# Patient Record
Sex: FEMALE | Race: WHITE | NOT HISPANIC OR LATINO | Employment: UNEMPLOYED | URBAN - METROPOLITAN AREA
[De-identification: names, ages, dates, MRNs, and addresses within clinical notes are randomized per-mention and may not be internally consistent; named-entity substitution may affect disease eponyms.]

---

## 2018-02-09 ENCOUNTER — OFFICE VISIT CONVERTED (OUTPATIENT)
Dept: INTERNAL MEDICINE | Facility: CLINIC | Age: 73
End: 2018-02-09
Attending: INTERNAL MEDICINE

## 2018-03-09 ENCOUNTER — OFFICE VISIT CONVERTED (OUTPATIENT)
Dept: INTERNAL MEDICINE | Facility: CLINIC | Age: 73
End: 2018-03-09
Attending: INTERNAL MEDICINE

## 2018-03-28 ENCOUNTER — CONVERSION ENCOUNTER (OUTPATIENT)
Dept: MAMMOGRAPHY | Facility: HOSPITAL | Age: 73
End: 2018-03-28

## 2018-04-20 ENCOUNTER — CONVERSION ENCOUNTER (OUTPATIENT)
Dept: MAMMOGRAPHY | Facility: HOSPITAL | Age: 73
End: 2018-04-20

## 2018-07-06 ENCOUNTER — OFFICE VISIT CONVERTED (OUTPATIENT)
Dept: INTERNAL MEDICINE | Facility: CLINIC | Age: 73
End: 2018-07-06
Attending: INTERNAL MEDICINE

## 2018-10-22 ENCOUNTER — CONVERSION ENCOUNTER (OUTPATIENT)
Dept: MAMMOGRAPHY | Facility: HOSPITAL | Age: 73
End: 2018-10-22

## 2019-04-05 ENCOUNTER — OFFICE VISIT CONVERTED (OUTPATIENT)
Dept: INTERNAL MEDICINE | Facility: CLINIC | Age: 74
End: 2019-04-05
Attending: INTERNAL MEDICINE

## 2019-04-05 ENCOUNTER — CONVERSION ENCOUNTER (OUTPATIENT)
Dept: INTERNAL MEDICINE | Facility: CLINIC | Age: 74
End: 2019-04-05

## 2019-08-23 ENCOUNTER — HOSPITAL ENCOUNTER (OUTPATIENT)
Dept: MAMMOGRAPHY | Facility: HOSPITAL | Age: 74
Discharge: HOME | End: 2019-08-23
Attending: INTERNAL MEDICINE

## 2019-11-20 ENCOUNTER — OFFICE VISIT CONVERTED (OUTPATIENT)
Dept: INTERNAL MEDICINE | Facility: CLINIC | Age: 74
End: 2019-11-20
Attending: INTERNAL MEDICINE

## 2019-11-20 ENCOUNTER — CONVERSION ENCOUNTER (OUTPATIENT)
Dept: INTERNAL MEDICINE | Facility: CLINIC | Age: 74
End: 2019-11-20

## 2019-11-20 ENCOUNTER — HOSPITAL ENCOUNTER (OUTPATIENT)
Dept: OTHER | Facility: HOSPITAL | Age: 74
Discharge: HOME | End: 2019-11-20
Attending: INTERNAL MEDICINE

## 2019-11-20 LAB
ALBUMIN SERPL-MCNC: 4.1 G/DL (ref 3.5–5)
ALBUMIN/GLOB SERPL: 1.3 {RATIO} (ref 1.4–2.6)
ALP SERPL-CCNC: 67 U/L (ref 43–160)
ALT SERPL-CCNC: 16 U/L (ref 10–40)
ANION GAP SERPL CALC-SCNC: 17 MMOL/L (ref 8–19)
AST SERPL-CCNC: 26 U/L (ref 15–50)
BASOPHILS # BLD AUTO: 0.05 10*3/UL (ref 0–0.2)
BASOPHILS NFR BLD AUTO: 0.9 % (ref 0–3)
BILIRUB SERPL-MCNC: 0.56 MG/DL (ref 0.2–1.3)
BUN SERPL-MCNC: 13 MG/DL (ref 5–25)
BUN/CREAT SERPL: 12 {RATIO} (ref 6–20)
CALCIUM SERPL-MCNC: 9 MG/DL (ref 8.7–10.4)
CHLORIDE SERPL-SCNC: 105 MMOL/L (ref 99–111)
CHOLEST SERPL-MCNC: 144 MG/DL (ref 107–200)
CHOLEST/HDLC SERPL: 4 {RATIO} (ref 3–6)
CONV ABS IMM GRAN: 0.02 10*3/UL (ref 0–0.2)
CONV CO2: 23 MMOL/L (ref 22–32)
CONV IMMATURE GRAN: 0.4 % (ref 0–1.8)
CONV TOTAL PROTEIN: 7.3 G/DL (ref 6.3–8.2)
CREAT UR-MCNC: 1.09 MG/DL (ref 0.5–0.9)
DEPRECATED RDW RBC AUTO: 47.9 FL (ref 36.4–46.3)
EOSINOPHIL # BLD AUTO: 0.11 10*3/UL (ref 0–0.7)
EOSINOPHIL # BLD AUTO: 2 % (ref 0–7)
ERYTHROCYTE [DISTWIDTH] IN BLOOD BY AUTOMATED COUNT: 14.2 % (ref 11.7–14.4)
ERYTHROCYTE [SEDIMENTATION RATE] IN BLOOD: 12 MM/H (ref 0–30)
GFR SERPLBLD BASED ON 1.73 SQ M-ARVRAT: 49 ML/MIN/{1.73_M2}
GLOBULIN UR ELPH-MCNC: 3.2 G/DL (ref 2–3.5)
GLUCOSE SERPL-MCNC: 98 MG/DL (ref 65–99)
HCT VFR BLD AUTO: 40 % (ref 37–47)
HDLC SERPL-MCNC: 36 MG/DL (ref 40–60)
HGB BLD-MCNC: 13.5 G/DL (ref 12–16)
LDLC SERPL CALC-MCNC: 78 MG/DL (ref 70–100)
LYMPHOCYTES # BLD AUTO: 1.5 10*3/UL (ref 1–5)
LYMPHOCYTES NFR BLD AUTO: 26.8 % (ref 20–45)
MCH RBC QN AUTO: 30.9 PG (ref 27–31)
MCHC RBC AUTO-ENTMCNC: 33.8 G/DL (ref 33–37)
MCV RBC AUTO: 91.5 FL (ref 81–99)
MONOCYTES # BLD AUTO: 0.39 10*3/UL (ref 0.2–1.2)
MONOCYTES NFR BLD AUTO: 7 % (ref 3–10)
NEUTROPHILS # BLD AUTO: 3.52 10*3/UL (ref 2–8)
NEUTROPHILS NFR BLD AUTO: 62.9 % (ref 30–85)
NRBC CBCN: 0 % (ref 0–0.7)
OSMOLALITY SERPL CALC.SUM OF ELEC: 292 MOSM/KG (ref 273–304)
PLATELET # BLD AUTO: 203 10*3/UL (ref 130–400)
PMV BLD AUTO: 11.4 FL (ref 9.4–12.3)
POTASSIUM SERPL-SCNC: 3.9 MMOL/L (ref 3.5–5.3)
RBC # BLD AUTO: 4.37 10*6/UL (ref 4.2–5.4)
SODIUM SERPL-SCNC: 141 MMOL/L (ref 135–147)
TRIGL SERPL-MCNC: 149 MG/DL (ref 40–150)
TSH SERPL-ACNC: 1.86 M[IU]/L (ref 0.27–4.2)
VLDLC SERPL-MCNC: 30 MG/DL (ref 5–37)
WBC # BLD AUTO: 5.59 10*3/UL (ref 4.8–10.8)

## 2020-02-26 ENCOUNTER — HOSPITAL ENCOUNTER (OUTPATIENT)
Dept: MAMMOGRAPHY | Facility: HOSPITAL | Age: 75
Discharge: HOME | End: 2020-02-26
Attending: INTERNAL MEDICINE

## 2020-03-12 ENCOUNTER — HOSPITAL ENCOUNTER (OUTPATIENT)
Dept: URGENT CARE | Facility: CLINIC | Age: 75
Discharge: HOME | End: 2020-03-12
Attending: PHYSICIAN ASSISTANT

## 2020-05-06 ENCOUNTER — TELEMEDICINE CONVERTED (OUTPATIENT)
Dept: INTERNAL MEDICINE | Facility: CLINIC | Age: 75
End: 2020-05-06
Attending: PHYSICIAN ASSISTANT

## 2020-11-02 ENCOUNTER — HOSPITAL ENCOUNTER (OUTPATIENT)
Dept: OTHER | Facility: HOSPITAL | Age: 75
Discharge: HOME | End: 2020-11-02
Attending: INTERNAL MEDICINE

## 2020-11-02 LAB
ALBUMIN SERPL-MCNC: 4.1 G/DL (ref 3.5–5)
ALBUMIN/GLOB SERPL: 1.5 {RATIO} (ref 1.4–2.6)
ALP SERPL-CCNC: 70 U/L (ref 43–160)
ALT SERPL-CCNC: 11 U/L (ref 10–40)
ANION GAP SERPL CALC-SCNC: 14 MMOL/L (ref 8–19)
AST SERPL-CCNC: 18 U/L (ref 15–50)
BASOPHILS # BLD AUTO: 0.06 10*3/UL (ref 0–0.2)
BASOPHILS NFR BLD AUTO: 0.8 % (ref 0–3)
BILIRUB SERPL-MCNC: 0.48 MG/DL (ref 0.2–1.3)
BUN SERPL-MCNC: 16 MG/DL (ref 5–25)
BUN/CREAT SERPL: 16 {RATIO} (ref 6–20)
CALCIUM SERPL-MCNC: 9.3 MG/DL (ref 8.7–10.4)
CHLORIDE SERPL-SCNC: 101 MMOL/L (ref 99–111)
CHOLEST SERPL-MCNC: 159 MG/DL (ref 107–200)
CHOLEST/HDLC SERPL: 4.1 {RATIO} (ref 3–6)
CONV ABS IMM GRAN: 0.01 10*3/UL (ref 0–0.2)
CONV CO2: 27 MMOL/L (ref 22–32)
CONV IMMATURE GRAN: 0.1 % (ref 0–1.8)
CONV TOTAL PROTEIN: 6.9 G/DL (ref 6.3–8.2)
CREAT UR-MCNC: 1.02 MG/DL (ref 0.5–0.9)
DEPRECATED RDW RBC AUTO: 47.7 FL (ref 36.4–46.3)
EOSINOPHIL # BLD AUTO: 0.14 10*3/UL (ref 0–0.7)
EOSINOPHIL # BLD AUTO: 1.9 % (ref 0–7)
ERYTHROCYTE [DISTWIDTH] IN BLOOD BY AUTOMATED COUNT: 14 % (ref 11.7–14.4)
GFR SERPLBLD BASED ON 1.73 SQ M-ARVRAT: 53 ML/MIN/{1.73_M2}
GLOBULIN UR ELPH-MCNC: 2.8 G/DL (ref 2–3.5)
GLUCOSE SERPL-MCNC: 118 MG/DL (ref 65–99)
HCT VFR BLD AUTO: 44 % (ref 37–47)
HDLC SERPL-MCNC: 39 MG/DL (ref 40–60)
HGB BLD-MCNC: 14.4 G/DL (ref 12–16)
LDLC SERPL CALC-MCNC: 78 MG/DL (ref 70–100)
LYMPHOCYTES # BLD AUTO: 1.82 10*3/UL (ref 1–5)
LYMPHOCYTES NFR BLD AUTO: 24.4 % (ref 20–45)
MCH RBC QN AUTO: 30.2 PG (ref 27–31)
MCHC RBC AUTO-ENTMCNC: 32.7 G/DL (ref 33–37)
MCV RBC AUTO: 92.2 FL (ref 81–99)
MONOCYTES # BLD AUTO: 0.48 10*3/UL (ref 0.2–1.2)
MONOCYTES NFR BLD AUTO: 6.4 % (ref 3–10)
NEUTROPHILS # BLD AUTO: 4.95 10*3/UL (ref 2–8)
NEUTROPHILS NFR BLD AUTO: 66.4 % (ref 30–85)
NRBC CBCN: 0 % (ref 0–0.7)
OSMOLALITY SERPL CALC.SUM OF ELEC: 290 MOSM/KG (ref 273–304)
PLATELET # BLD AUTO: 205 10*3/UL (ref 130–400)
PMV BLD AUTO: 11.5 FL (ref 9.4–12.3)
POTASSIUM SERPL-SCNC: 3.4 MMOL/L (ref 3.5–5.3)
RBC # BLD AUTO: 4.77 10*6/UL (ref 4.2–5.4)
SODIUM SERPL-SCNC: 139 MMOL/L (ref 135–147)
T4 FREE SERPL-MCNC: 1.7 NG/DL (ref 0.9–1.8)
TRIGL SERPL-MCNC: 210 MG/DL (ref 40–150)
TSH SERPL-ACNC: 0.51 M[IU]/L (ref 0.27–4.2)
VLDLC SERPL-MCNC: 42 MG/DL (ref 5–37)
WBC # BLD AUTO: 7.46 10*3/UL (ref 4.8–10.8)

## 2021-05-13 NOTE — PROGRESS NOTES
Progress Note      Patient Name: Nancie Grissom   Patient ID: [id number]   Sex: Female   YOB: 1945    Primary Care Provider: Mauro Najera MD    Visit Date: May 6, 2020    Provider: Nyla Tolbert PA-C   Location: [facility]   Location Address: [address] [place] [address], [address]  [place], [place]  [id number]   Location Phone: [phone]          Chief Complaint  · Annual Wellness Exam      History Of Present Illness  Video Conferencing Visit  Nancie Grissom is a 74 year old /White female who is presenting for evaluation via video conferencing via [employer]. Verbal consent obtained before beginning visit.   The following staff were present during this visit: Nyla Tolbert PA-C   Her Primary Care Provider is Mauro Najera MD. Her comprehensive Care Team list, including suppliers, has been updated on the Facesheet. Her medical/family history, height, weight, BMI, and blood pressure have been reviewed and are in the chart. The Health Risk Assessment has been completed and scanned in the chart.   Medications are listed in the medication list.   The active problem list includes: Depression, GERD, Hyperlipidemia, Hypertension, Hypothyroidism, Osteoporosis, and Vitamin D deficiency   The patient does not have a history of substance use.   Patient reports her diet is adequate.   The Mini-Cog has been administered and is scanned in chart. The results are negative. Her cognitive function is without limitation.   A hearing loss screen was completed today and the result is negative.   Patient has the following risk factors for depression: currently has depression. Patient completed the PHQ-9 today and it has been scanned in the chart. The total score is 1-4.   The Timed Up and Go screen was administered today and the result is negative.   The Tinajero Index of [place] in ADLs indicated full function (score of 6).   A Falls Risk Assessment has been completed, including a review of home fall  hazards and medication review.   Overall, the patient's functional ability is noted by this provider to be within normal limits. Her level of safety is noted to be within normal limits. Her balance/gait is within normal limits. There have been no falls in the past year. Patient-specific home safety recommendations have been reviewed and a copy has been given to patient.   She denies issues with leaking urine.   There are no additional risk factors identified.   Living Will/Advanced Directive has not previously been completed.   Personalized health advice was given to the patient and a written health screening schedule was established; see Plan for details.      Pt refuses colonoscopy or screening for colon cancer.    Pt refuses referral for breast biopsy for abnormal mammogram.    Pt is due to get eye exam. She wear glasses. She wants to make her own apt and does not want referral.       Past Medical History  Disease Name Date Onset Notes   Depression --  --    GERD 02/12/2015 --    Hyperlipidemia --  --    Hypertension --  --    Hypothyroidism --  --    Osteoporosis --  --    Vitamin D deficiency --  --          Past Surgical History  Procedure Name Date Notes   Repair of right rotator cuff --  --          Medication List  Name Date Started Instructions   atorvastatin 20 mg oral tablet 11/27/2019 TAKE 1 TABLET BY MOUTH ONCE DAILY AT BEDTIME   hydrochlorothiazide 25 mg oral tablet 03/17/2020 take 1 tablet (25 mg) by oral route once daily   Protonix 40 mg oral tablet,delayed release (DR/EC) 10/15/2019 take 1 tablet (40 mg) by oral route once daily for 30 days   Synthroid 88 mcg oral tablet 03/24/2020 take 1 tablet (88 mcg) by oral route once daily on empty stomach, without other medications.   trazodone 100 mg oral tablet 03/24/2020 take 1 tablet by oral route daily for 90 days   Voltaren 1 % topical gel 11/20/2019 apply 2 grams to the affected area(s) by topical route 4 times per day         Allergy List  Allergen  Name Date Reaction Notes   NO KNOWN DRUG ALLERGIES --  --  --          Family Medical History  Disease Name Relative/Age Notes   *No Known Family History  --          Reproductive History  Menstrual   Menopause Status: Postmenopausal HRT?: No   Pregnancy Summary   Total Pregnancies: 2 Full Term: 0 Premature: 0   Ab Induced: 0 Ab Spontaneous: 0 Ectopics: 0   Multiples: 0 Livin         Social History  Finding Status Start/Stop Quantity Notes   Tobacco Never --/-- --  --          Immunizations  NameDate Admin Mfg Trade Name Lot Number Route Inj VIS Given VIS Publication   [id number]/ [facility] FLUZONE-HIGH DOSE [id number] IM RD 10/21/2019    Comments: Patient tolerated well   Lowgejvqy42/10/2018 PMC FLUZONE-HIGH DOSE [id number] IM RD 10/10/2018 2014   Comments: pt tolerated well   Ocskygqqg21/09/2018 [facility]C FLUZONE-HIGH DOSE [id number] IM RD 2018   Comments: Pt tolerated well   Ntptkfgur81/03/2014 SKB Fluarix, quadrivalent, preservative free [id number] IM LD 2014   Comments: pt left office in stable condition   Hygmekuuk06/17/2014 SKB Fluarix, quadrivalent, preservative free [id number] IM LD 2014   Comments:    Amilyjtbiume86/12/2014 MSD PNEUMOVAX 23 [id number] IM LD 2014 10/06/2009   Comments: Tolerated well and left office in stable condition.         Review of Systems  · Constitutional  o Denies  o : fatigue, fever  · Eyes  o Denies  o : discharge from eye, redness  · HENT  o Denies  o : headaches, congestion  · Cardiovascular  o Denies  o : chest pain, palpitations  · Respiratory  o Denies  o : shortness of breath, wheezing, cough  · Gastrointestinal  o Denies  o : vomiting, diarrhea, constipation  · Genitourinary  o Denies  o : dysuria, hematuria  · Integument  o Denies  o : rash, new skin lesions  · Neurologic  o Denies  o : altered mental status, seizures  · Musculoskeletal  o Denies  o : weakness, joint swelling  · Psychiatric  o Denies  o : anxiety,  depression  · Heme-Lymph  o Denies  o : lymph node enlargement, tenderness      Physical Examination     Gen: well-nourished, no acute distress  HENT: atraumatic, normocephalic  Eyes: extraocular movements intact, no scleral icterus  Lung: breathing comfortably, no cough  Skin: no visible rash, no lesions  Neuro: grossly oriented to person, place, and time. no facial droop   Psych: normal mood and affect  MSK: Normal movement of legs and arms upon ambulation  Gait normal                 Assessment  · Encounter for Medicare annual wellness exam     V70.0/Z00.00  · Screening for depression     V79.0/Z13.89  negative, mood controlled  · Alcohol screening     V79.1/Z13.89  negative  · Advanced care planning/counseling discussion     V65.49/Z71.89  Discussed need for advanced directive/living will. Discussed importance and benefits of having wishes documented for self and family in the event of a health emergency in the future. We will mail the paperwork to the patient to fill out and patient will bring in to be notarized and documented in chart.  · Need for hepatitis C screening test     V73.89/Z11.59  Added to labs  · Hyperlipidemia     272.4/E78.5  labs ordered  · Hypothyroidism     244.9/E03.9  labs ordered  · Hypertension     401.9/I10  · Osteopenia     733.90/M85.80  Will get updated DEXA  · Screening for diabetes mellitus     V77.1/Z13.1  labs ordered  · Abnormal mammogram     793.80/R92.8  discussed abnormal mammogram showing need for biopsy. Pt refused biopsy at this time. She does not want further workup. Pt understands risks.      Plan  · Orders  o Falls Risk Assessment Completed (0198F) - V70.0/Z00.00 - 05/06/2020  o Brief hearing screening (written) [facility] () - V70.0/Z00.00 - 05/06/2020  o Annual Wellness Visit-includes a Personalized Prevention Plan of Service (PPS), SUBSEQUENT VISIT (Medicare) () - V70.0/Z00.00 - 05/06/2020  o Presence or absence of urinary incontinence assessed (YOSSI) (1090F) -  V70.0/Z00.00 - 05/06/2020  o Annual depression screening using the PHQ-9 tool, 15 minutes (, 12523) - V79.0/Z13.89 - 05/06/2020  o Annual alcohol screening using the AUDIT-C tool, 15 minutes [facility] (05257, ) - V79.1/Z13.89 - 05/06/2020  o Negative alcohol screening () - V79.1/Z13.89 - 05/06/2020  o Hepatitis C antibody MEDICARE screening [facility] (92314, ) - V73.89/Z11.59 - 05/06/2020  o ACO-39: Current medications updated and reviewed () - - 05/06/2020  o ACO-20: Screening Mammography documented and reviewed () - - 05/06/2020  o ACO-14: Influenza immunization administered or previously received () - - 05/06/2020  o Free T4 (07215) - 244.9/E03.9 - 05/06/2020  o Hgb A1c [facility] (36968) - 401.9/I10, 272.4/E78.5, 244.9/E03.9, V77.1/Z13.1 - 05/06/2020  o Physical, Primary Care Panel (CBC, CMP, Lipid, TSH) [facility] (55333, 11759, 14754, 59789) - 401.9/I10, 272.4/E78.5, 244.9/E03.9, 733.90/M85.80 - 05/06/2020  o Vitamin D (25-Hydroxy) Level (78259) - 401.9/I10, 272.4/E78.5, 244.9/E03.9, 733.90/M85.80 - 05/06/2020  o ACO-18: Negative screen for clinical depression using a standardized tool () - - 05/06/2020  o ACO-13: Fall Risk Screening with no falls in past year or only one fall without injury in the past year (1101F) - - 05/06/2020  o ACO - Pt declines to or was not able to provide an Advance Care Plan or name a Surrogate Decision Maker (1124F) - - 05/06/2020  o DEXA Bone Density, 1 or more sites, axial skeleton [facility] (66798) - 733.90/M85.80 - 05/06/2020  · Medications  o Medications have been Reconciled  o Transition of Care or Provider Policy  · Instructions  o Health Risk Assessment has been reviewed with the patient.  o Written health screening schedule for next 5-10 years was established with patient; information scanned in chart and given/mailed to patient.  o Fall prevention methods discussed and a copy of recommendations given/mailed to patient.  o Depression Screen completed and scanned  into the EMR under the designated folder within the patient's documents.  o Today's PHQ-9 result is 3  o Medicare suggests a once in a lifetime screening for Hepatitis C for all Medicare beneficiaries born between 7969-2921.  o Advised that cheeses and other sources of dairy fats, animal fats, fast food, and the extras (candy, pastries, pies, doughnuts and cookies) all contain LDL raising nutrients. Advised to increase fruits, vegetables, whole grains, and to monitor portion sizes.   o Handouts were given to patient: Advanced Directive  o Patient was educated/instructed on their diagnosis, treatment and medications prior to discharge from the clinic today.  o Electronically Identified Patient Education Materials Provided Electronically  · Disposition  o Call or Return if symptoms worsen or persist.  o Follow up in 6 months  o Needs to follow up with MD  o Labs to be printed  o Order placed for imaging            Electronically Signed by: Nyla Tolbert PA-C -Author on May 7, 2020 09:34:12 AM

## 2021-05-15 VITALS — WEIGHT: 171 LBS | HEIGHT: 63 IN | BODY MASS INDEX: 30.3 KG/M2

## 2021-05-15 VITALS
RESPIRATION RATE: 16 BRPM | TEMPERATURE: 98.2 F | HEART RATE: 80 BPM | WEIGHT: 165.12 LBS | OXYGEN SATURATION: 98 % | SYSTOLIC BLOOD PRESSURE: 126 MMHG | HEIGHT: 63 IN | BODY MASS INDEX: 29.26 KG/M2 | DIASTOLIC BLOOD PRESSURE: 80 MMHG

## 2021-05-16 VITALS
SYSTOLIC BLOOD PRESSURE: 134 MMHG | DIASTOLIC BLOOD PRESSURE: 76 MMHG | WEIGHT: 171 LBS | OXYGEN SATURATION: 95 % | HEIGHT: 63 IN | BODY MASS INDEX: 30.3 KG/M2 | HEART RATE: 102 BPM | TEMPERATURE: 98.1 F

## 2021-05-16 VITALS
OXYGEN SATURATION: 98 % | BODY MASS INDEX: 29.77 KG/M2 | TEMPERATURE: 98 F | DIASTOLIC BLOOD PRESSURE: 86 MMHG | WEIGHT: 168 LBS | SYSTOLIC BLOOD PRESSURE: 142 MMHG | HEART RATE: 86 BPM | HEIGHT: 63 IN

## 2021-05-16 VITALS
HEIGHT: 63 IN | TEMPERATURE: 97.2 F | OXYGEN SATURATION: 97 % | RESPIRATION RATE: 16 BRPM | WEIGHT: 176.12 LBS | DIASTOLIC BLOOD PRESSURE: 100 MMHG | HEART RATE: 99 BPM | SYSTOLIC BLOOD PRESSURE: 154 MMHG | BODY MASS INDEX: 31.21 KG/M2

## 2021-05-20 ENCOUNTER — HOSPITAL ENCOUNTER (OUTPATIENT)
Dept: OTHER | Facility: HOSPITAL | Age: 76
Discharge: HOME | End: 2021-05-20
Attending: STUDENT IN AN ORGANIZED HEALTH CARE EDUCATION/TRAINING PROGRAM

## 2021-05-20 ENCOUNTER — CONVERSION ENCOUNTER (OUTPATIENT)
Dept: INTERNAL MEDICINE | Facility: CLINIC | Age: 76
End: 2021-05-20

## 2021-05-20 ENCOUNTER — OFFICE VISIT CONVERTED (OUTPATIENT)
Dept: INTERNAL MEDICINE | Facility: CLINIC | Age: 76
End: 2021-05-20
Attending: STUDENT IN AN ORGANIZED HEALTH CARE EDUCATION/TRAINING PROGRAM

## 2021-05-20 LAB
ALBUMIN SERPL-MCNC: 4.3 G/DL (ref 3.5–5)
ALBUMIN/GLOB SERPL: 1.4 {RATIO} (ref 1.4–2.6)
ALP SERPL-CCNC: 62 U/L (ref 43–160)
ALT SERPL-CCNC: 16 U/L (ref 10–40)
ANION GAP SERPL CALC-SCNC: 17 MMOL/L (ref 8–19)
AST SERPL-CCNC: 23 U/L (ref 15–50)
BASOPHILS # BLD AUTO: 0.06 10*3/UL (ref 0–0.2)
BASOPHILS NFR BLD AUTO: 0.8 % (ref 0–3)
BILIRUB SERPL-MCNC: 0.47 MG/DL (ref 0.2–1.3)
BUN SERPL-MCNC: 14 MG/DL (ref 5–25)
BUN/CREAT SERPL: 12 {RATIO} (ref 6–20)
CALCIUM SERPL-MCNC: 9 MG/DL (ref 8.7–10.4)
CHLORIDE SERPL-SCNC: 101 MMOL/L (ref 99–111)
CHOLEST SERPL-MCNC: 160 MG/DL (ref 107–200)
CHOLEST/HDLC SERPL: 4 {RATIO} (ref 3–6)
CONV ABS IMM GRAN: 0.02 10*3/UL (ref 0–0.2)
CONV CO2: 25 MMOL/L (ref 22–32)
CONV IMMATURE GRAN: 0.3 % (ref 0–1.8)
CONV TOTAL PROTEIN: 7.4 G/DL (ref 6.3–8.2)
CREAT UR-MCNC: 1.13 MG/DL (ref 0.5–0.9)
DEPRECATED RDW RBC AUTO: 46.5 FL (ref 36.4–46.3)
EOSINOPHIL # BLD AUTO: 0.11 10*3/UL (ref 0–0.7)
EOSINOPHIL # BLD AUTO: 1.4 % (ref 0–7)
ERYTHROCYTE [DISTWIDTH] IN BLOOD BY AUTOMATED COUNT: 14.1 % (ref 11.7–14.4)
GFR SERPLBLD BASED ON 1.73 SQ M-ARVRAT: 47 ML/MIN/{1.73_M2}
GLOBULIN UR ELPH-MCNC: 3.1 G/DL (ref 2–3.5)
GLUCOSE SERPL-MCNC: 94 MG/DL (ref 65–99)
HCT VFR BLD AUTO: 42.6 % (ref 37–47)
HDLC SERPL-MCNC: 40 MG/DL (ref 40–60)
HGB BLD-MCNC: 14.2 G/DL (ref 12–16)
LDLC SERPL CALC-MCNC: 90 MG/DL (ref 70–100)
LYMPHOCYTES # BLD AUTO: 1.67 10*3/UL (ref 1–5)
LYMPHOCYTES NFR BLD AUTO: 21.5 % (ref 20–45)
MCH RBC QN AUTO: 30.1 PG (ref 27–31)
MCHC RBC AUTO-ENTMCNC: 33.3 G/DL (ref 33–37)
MCV RBC AUTO: 90.3 FL (ref 81–99)
MONOCYTES # BLD AUTO: 0.54 10*3/UL (ref 0.2–1.2)
MONOCYTES NFR BLD AUTO: 7 % (ref 3–10)
NEUTROPHILS # BLD AUTO: 5.35 10*3/UL (ref 2–8)
NEUTROPHILS NFR BLD AUTO: 69 % (ref 30–85)
NRBC CBCN: 0 % (ref 0–0.7)
OSMOLALITY SERPL CALC.SUM OF ELEC: 288 MOSM/KG (ref 273–304)
PLATELET # BLD AUTO: 201 10*3/UL (ref 130–400)
PMV BLD AUTO: 11.1 FL (ref 9.4–12.3)
POTASSIUM SERPL-SCNC: 3.7 MMOL/L (ref 3.5–5.3)
RBC # BLD AUTO: 4.72 10*6/UL (ref 4.2–5.4)
SODIUM SERPL-SCNC: 139 MMOL/L (ref 135–147)
T4 FREE SERPL-MCNC: 1.3 NG/DL (ref 0.9–1.8)
TRIGL SERPL-MCNC: 149 MG/DL (ref 40–150)
TSH SERPL-ACNC: 0.75 M[IU]/L (ref 0.27–4.2)
VLDLC SERPL-MCNC: 30 MG/DL (ref 5–37)
WBC # BLD AUTO: 7.75 10*3/UL (ref 4.8–10.8)

## 2021-05-21 LAB — 25(OH)D3 SERPL-MCNC: 31.8 NG/ML (ref 30–100)

## 2021-06-05 NOTE — PROGRESS NOTES
Progress Note      Patient Name: Nancie Grissom   Patient ID: [id number]   Sex: Female   YOB: 1945    Primary Care Provider: Wen Leblanc MD    Visit Date: May 20, 2021    Provider: Wen Leblanc MD   Location: [facility]   Location Address: [address] [place] [address], [address]  [place], [place]  [id number]   Location Phone: [phone]          Chief Complaint  · Annual exam   · Hypothyroidism   · Hyperlipidemia       History Of Present Illness  Nancie Grissom is a 75 year old /White female who presents for evaluation and treatment of:      pt is here for a routine follow-up/exam- pt was a previously Dr. Najera patient     Sleepign concern:   She is on trazadone and melatonin. Falls asleep around 12:30am, typically wakes up at 3 am to urinate, and sometimes is able to fall back asleep until 0600, however she also has times when it will take a couple of hours for her to fall back asleep. Endorses morning HA- states she's been told by her niece that she does snore, and niece has concern for RICK, however symptoms are manageable, states they often go away after her morning cup of coffee.     Hypothyroidism:   Chronic, for many years. No recent dose adjustment.      at of 3 yrs ago. She was  for 57 yrs. she has 2 sons who are her support system.   She has a trip to [facility] up.   hx of right shoulder surgery, and surgery involving her thumbs.   last PAP smear was in 2018. Pt with hx of hysterectomy in the 90's, ovaries are in place, states she is not sure if her cervix remains in place.     Right knee sometimes gives out-hx of meniscal tear in the past. Endorses pain every so often, fairly infrequently. typically uses a lidocaine gel which does not offer any relieve. Diclofenac gel is noted on her MAR which she states never using, she was unaware of this agent being prescribed for her in the past. She has a hx of osteopenia on her last Dexa scan from a few years  ago.    hx of shingles 25 yrs ago.     HTN: chronic and stable     GERD: chronic and stable.       Abnormal mammogram-  hx of abnormal mammograms with findings of 0.6cm grouping of calcification within the central area of the right breast, biopsy has been recommended however patient is not interested in pursuing biopsy at that time. her last mammogram was in Feb 2020 and per report she was to have returned in 6 mos for repeat mammogram.     Colon cancer screening: never had a colonoscopy.     never smoker  Social drinker        She is fully vaccinated with the Christiano and Christiano.       Past Medical History  Disease Name Date Onset Notes   Depression --  --    GERD 02/12/2015 --    Hyperlipidemia --  --    Hypertension --  --    Hypothyroidism --  --    Osteoporosis --  --    Vitamin D deficiency --  --          Past Surgical History  Procedure Name Date Notes   Repair of right rotator cuff --  --          Medication List  Name Date Started Instructions   atorvastatin 20 mg oral tablet 04/28/2021 TAKE 1 TABLET BY MOUTH ONCE DAILY AT BEDTIME   hydrochlorothiazide 25 mg oral tablet 04/28/2021 take 1 tablet (25 mg) by oral route once daily   Protonix 40 mg oral tablet,delayed release (DR/EC) 05/20/2021 take 1 tablet (40 mg) by oral route once daily for 30 days   Synthroid 88 mcg oral tablet 03/08/2021 take 1 tablet (88 mcg) by oral route once daily on empty stomach, without other medications.   trazodone 100 mg oral tablet 04/28/2021 take 1 tablet by oral route daily for 90 days   Voltaren 1 % topical gel 05/20/2021 apply 2 grams to the affected area(s) by topical route 4 times per day for 30 days         Allergy List  Allergen Name Date Reaction Notes   NO KNOWN DRUG ALLERGIES --  --  --        Allergies Reconciled  Family Medical History  Disease Name Relative/Age Notes   *No Known Family History  --          Reproductive History  Menstrual   Menopause Status: Postmenopausal HRT?: No   Pregnancy Summary   Total  "Pregnancies: 2 Full Term: 0 Premature: 0   Ab Induced: 0 Ab Spontaneous: 0 Ectopics: 0   Multiples: 0 Livin         Social History  Finding Status Start/Stop Quantity Notes   Tobacco Never --/-- --  --          Immunizations  NameDate Admin Mfg Trade Name Lot Number Route Inj VIS Given VIS Publication   Nukxgldvm54/ [facility] FLUZONE-HIGH DOSE [id number] IM RD 10/21/2019    Comments: Patient tolerated well   Iwoshhsqwjir76/12/2014 MSD PNEUMOVAX 23 [id number] IM LD 2014 10/06/2009   Comments: Tolerated well and left office in stable condition.         Review of Systems  · Constitutional  o Denies  o : fever, fatigue, weight loss, weight gain  · Cardiovascular  o Denies  o : lower extremity edema, claudication, chest pressure, palpitations  · Respiratory  o Denies  o : shortness of breath, wheezing, cough, hemoptysis, dyspnea on exertion  · Gastrointestinal  o Denies  o : nausea, vomiting, diarrhea, constipation, abdominal pain  · Musculoskeletal  o * See HPI  · Psychiatric  o * See HPI      Vitals  Date Time BP Position Site L\R Cuff Size HR RR TEMP (F) WT  HT  BMI kg/m2 BSA m2 O2 Sat FR L/min FiO2 HC       2019 11:01 AM         171lbs 0oz 5'  3\" 30.29 1.86       2019 11:11 /80 Sitting    80 - R 16 98.2 165lbs 2oz 5'  3\" 29.25 1.82 98 %  21%    2021 02:13 /76 Sitting    85 - R  99.6 154lbs 2oz 5'  3\" 27.3 1.76 97 %  21%          Physical Examination  · Constitutional  o Appearance  o : no acute distress, well-nourished  · Head and Face  o Head  o :   § Inspection  § : atraumatic, normocephalic  · Ears, Nose, Mouth and Throat  o Ears  o :   § External Ears  § : normal  o Nose  o :   § Intranasal Exam  § : nares patent  o Oral Cavity  o :   § Oral Mucosa  § : moist mucous membranes  o Throat  o :   § Oropharynx  § : no inflammation or lesions present, tonsils within normal limits  · Neck  o Thyroid  o : gland size normal, nontender, no nodules or masses present on palpation, " symmetric  · Respiratory  o Respiratory Effort  o : breathing comfortably, symmetric chest rise  o Auscultation of Lungs  o : clear to asculatation bilaterally, no wheezes, rales, or rhonchii  · Cardiovascular  o Heart  o :   § Auscultation of Heart  § : regular rate and rhythm, III/VI systolic murmur, rubs, or gallops  o Peripheral Vascular System  o :   § Extremities  § : no edema  · Gastrointestinal  o Abdominal Examination  o : abdomen nontender to palpation, normal bowel sounds, tone normal without rigidity or guarding,   · Lymphatic  o Neck  o : no lymphadenopathy present  · Skin and Subcutaneous Tissue  o General Inspection  o : no lesions present, no areas of discoloration, skin turgor normal  · Neurologic  o Mental Status Examination  o :   § Orientation  § : grossly oriented to person, place and time  o Cranial Nerves  o : crainial nerves 2-12 grossly intact, facial sensation normal bilaterally, no facial weakness present  o Gait and Station  o :   § Gait Screening  § : normal gait          Assessment  · Screening for depression     V79.0/Z13.89  Positive screen with score of 6 signifying mild depression, however 3 of the 6 points are related to her chronic sleeping difficulties and not at all to her mood. Advised pt to increase melatonin to 10 mg, currently on 5mg. continue Trazodone at current dose.   · Screening for colon cancer     V76.51/Z12.11  Patient declines. She's never had a colonoscopy.  · Annual physical exam     V70.0/Z00.00  Presenting for annual physical exam and to meet new provider as prior pcp is no longer in our office.  · Hypothyroidism     244.9/E03.9  Chronic and stable on current Synthroid dose which has not changed for the past several years. Due for labs.   · GERD     530.81  chronic and stable. Need refill for her protonix.   · Hypertension     401.9/I10  Chronic and stable on current regimen with HCTZ alone.  · Osteopenia     733.90/M85.80  Most recent Dexa reviewed with  findings of osteopenia. She is due for repeat. Ordered today.   · Screening for breast cancer     V76.10/Z12.39  Overdue for repeat screening, which was recommended for 6 mos following her last mammograms in Feb 2020. New diagnostic mammogram, 3d ordered given hx of dense breast.   · Abnormal mammogram     793.80/R92.8  Chronic. Interval worsening of breast lesion per 2020 mammogram.  · Snoring     786.09/R06.83  Presume chronic, with morning HA, concerning for RICK. Discussed sleep study with patient which she is not interested in pursuing at this time, as she states her symptoms are manageable.   · Insomnia     780.52/G47.00  Chronic. Concern for RICK. see Snoring and screening for depression above.     Problems Reconciled  Plan  · Orders  o ACO-18: Positive screen for clinical depression using a standardized tool and a follow-up plan documented () - V79.0/Z13.89 - 05/20/2021  o Free T4 (87476) - 244.9/E03.9 - 05/20/2021  o Physical, Primary Care Panel (CBC, CMP, Lipid, TSH) [facility] (66974, 21224, 17918, 17937) - 530.81, 401.9/I10, 244.9/E03.9 - 05/20/2021  o Vitamin D (25-Hydroxy) Level (01509) - 401.9/I10, 244.9/E03.9 - 05/20/2021  o ACO-39: Current medications updated and reviewed (1159F, ) - 530.81, 401.9/I10, 244.9/E03.9 - 05/20/2021  o DEXA Bone Density, 1 or more sites, axial skeleton [facility] (11182) - 733.90/M85.80 - 05/20/2021  o Mammogram Diagnostic 3D breast bilateral (w/US if needed) [facility] (, ) - V76.10/Z12.39 - 05/20/2021  · Medications  o Protonix 40 mg oral tablet,delayed release (DR/EC)   SIG: take 1 tablet (40 mg) by oral route once daily for 30 days   DISP: (30) Tablet with 5 refills  Refilled on 05/20/2021     o Voltaren 1 % topical gel   SIG: apply 2 grams to the affected area(s) by topical route 4 times per day for 30 days   DISP: (1) Tube with 3 refills  Refilled on 05/20/2021     o Medications have been Reconciled  o Transition of Care or Provider Policy  · Instructions  o Depression  Screen completed and scanned into the EMR under the designated folder within the patient's documents.  o Today's PHQ-9 result is _6__  o The provider screening met the required time of 15 minutes.  o Patient declines colorectal cancer screening. Discussed risks associated with not having screening performed.   o Reviewed health maintenance flowsheet and updated information. Orders were placed and/or patient's response was documented.  o Patient is taking medications as prescribed and doing well.   o Patient was educated/instructed on their diagnosis, treatment and medications prior to discharge from the clinic today.  o Patient was instructed to exercise regularly.  o Call the office with any concerns or questions.  · Disposition  o Follow up in 6 months.            Electronically Signed by: Wen Leblanc MD -Author on May 21, 2021 12:38:40 AM

## 2021-07-07 ENCOUNTER — TELEPHONE (OUTPATIENT)
Dept: INTERNAL MEDICINE | Facility: CLINIC | Age: 76
End: 2021-07-07

## 2021-07-07 RX ORDER — HYDROCHLOROTHIAZIDE 25 MG/1
TABLET ORAL
COMMUNITY
Start: 2021-04-28 | End: 2021-07-08 | Stop reason: SDUPTHER

## 2021-07-07 RX ORDER — PANTOPRAZOLE SODIUM 40 MG/1
TABLET, DELAYED RELEASE ORAL
COMMUNITY
Start: 2021-05-26 | End: 2021-12-20 | Stop reason: SDUPTHER

## 2021-07-07 RX ORDER — TRAZODONE HYDROCHLORIDE 100 MG/1
TABLET ORAL
COMMUNITY
Start: 2021-04-28 | End: 2021-07-30 | Stop reason: SDUPTHER

## 2021-07-07 RX ORDER — LEVOTHYROXINE SODIUM 88 UG/1
TABLET ORAL
COMMUNITY
Start: 2021-03-08 | End: 2021-07-08 | Stop reason: SDUPTHER

## 2021-07-07 RX ORDER — ATORVASTATIN CALCIUM 20 MG/1
TABLET, FILM COATED ORAL
COMMUNITY
Start: 2021-04-28 | End: 2021-10-15 | Stop reason: SDUPTHER

## 2021-07-07 NOTE — TELEPHONE ENCOUNTER
Caller: Nancie Girssom    Relationship: Self    Best call back number: [phone]    Medication needed:   Requested Prescriptions      No prescriptions requested or ordered in this encounter   SYNTHROID 88 MG  HYDROCODONE 25 MG    When do you need the refill by: ASAP    What additional details did the patient provide when requesting the medication: PATIENT HAS BEEN OUT OF MEDS FOR A FEW DAYS    Does the patient have less than a 3 day supply:  [x] Yes  [] No    What is the patient's preferred pharmacy: [address] [phone] [facility] [phone] FX

## 2021-07-08 ENCOUNTER — TELEPHONE (OUTPATIENT)
Dept: INTERNAL MEDICINE | Facility: CLINIC | Age: 76
End: 2021-07-08

## 2021-07-08 RX ORDER — LEVOTHYROXINE SODIUM 88 UG/1
88 TABLET ORAL DAILY
Qty: 90 TABLET | Refills: 0 | Status: SHIPPED | OUTPATIENT
Start: 2021-07-08 | End: 2021-10-15 | Stop reason: SDUPTHER

## 2021-07-08 RX ORDER — HYDROCHLOROTHIAZIDE 25 MG/1
25 TABLET ORAL DAILY
Qty: 90 TABLET | Refills: 1 | Status: SHIPPED | OUTPATIENT
Start: 2021-07-08 | End: 2022-07-19 | Stop reason: SDUPTHER

## 2021-07-08 NOTE — TELEPHONE ENCOUNTER
Called patient back and let her know meds have been processed. Patient stated understanding. No other issues or concerns noted currently per patient.  Please look to previous task for med refills.

## 2021-07-08 NOTE — TELEPHONE ENCOUNTER
Caller: Nancie Grissom    Relationship: Self    Best call back number: [phone]     What is the best time to reach you: ANY    Who are you requesting to speak with (clinical staff, provider,  specific staff member): CLINICAL    What was the call regarding: PATIENT STATES SHE WOULD LIKE A CALL BACK TO GET THE STATUS OF HER MEDICATION REFILL     Do you require a callback: YES

## 2021-07-15 VITALS
SYSTOLIC BLOOD PRESSURE: 118 MMHG | OXYGEN SATURATION: 97 % | TEMPERATURE: 99.6 F | HEART RATE: 85 BPM | DIASTOLIC BLOOD PRESSURE: 76 MMHG | HEIGHT: 63 IN | WEIGHT: 154.12 LBS | BODY MASS INDEX: 27.31 KG/M2

## 2021-07-30 ENCOUNTER — TELEPHONE (OUTPATIENT)
Dept: INTERNAL MEDICINE | Facility: CLINIC | Age: 76
End: 2021-07-30

## 2021-07-30 RX ORDER — TRAZODONE HYDROCHLORIDE 100 MG/1
100 TABLET ORAL DAILY
Qty: 90 TABLET | Refills: 1 | Status: SHIPPED | OUTPATIENT
Start: 2021-07-30 | End: 2021-10-15 | Stop reason: SDUPTHER

## 2021-07-30 NOTE — TELEPHONE ENCOUNTER
Last apt 5/21/2021  Pt to follow up in 6 months refill sent  Left a detailed message for patient.

## 2021-07-30 NOTE — TELEPHONE ENCOUNTER
Caller: Jaciel Nancie    Relationship: Self    Best call back number: [phone]     Medication needed:   Requested Prescriptions     Pending Prescriptions Disp Refills   • traZODone (DESYREL) 100 MG tablet         When do you need the refill by: 07/30/21     What additional details did the patient provide when requesting the medication: PATIENT IS NEEDING A REFILL. PLEASE CALL AND ADVISE.     Does the patient have less than a 3 day supply:  [x] Yes  [] No    What is the patient's preferred pharmacy: [address] [phone] [facility] [phone] FX

## 2021-10-15 ENCOUNTER — TELEPHONE (OUTPATIENT)
Dept: INTERNAL MEDICINE | Facility: CLINIC | Age: 76
End: 2021-10-15

## 2021-10-15 RX ORDER — ATORVASTATIN CALCIUM 20 MG/1
20 TABLET, FILM COATED ORAL NIGHTLY
Qty: 90 TABLET | Refills: 1 | Status: SHIPPED | OUTPATIENT
Start: 2021-10-15 | End: 2022-01-21 | Stop reason: SDUPTHER

## 2021-10-15 RX ORDER — LEVOTHYROXINE SODIUM 88 UG/1
88 TABLET ORAL DAILY
Qty: 90 TABLET | Refills: 1 | Status: SHIPPED | OUTPATIENT
Start: 2021-10-15 | End: 2022-01-18 | Stop reason: SDUPTHER

## 2021-10-15 RX ORDER — TRAZODONE HYDROCHLORIDE 100 MG/1
100 TABLET ORAL DAILY
Qty: 90 TABLET | Refills: 1 | Status: SHIPPED | OUTPATIENT
Start: 2021-10-15 | End: 2022-01-21 | Stop reason: SDUPTHER

## 2021-10-15 NOTE — TELEPHONE ENCOUNTER
Caller: Nancie Grissom    Relationship: Self      Medication requested (name and dosage):   - atorvastatin (LIPITOR) 20 MG tablet  - levothyroxine (Synthroid) 88 MCG tablet  - traZODone (DESYREL) 100 MG tablet    Pharmacy where request should be sent:   [place] [address] - [phone] PH - [phone] FX  [phone]    Additional details provided by patient: PATIENT CALLED TO REQUEST REFILLS OF THESE MEDICATIONS.      Best call back number: [phone]     Does the patient have less than a 3 day supply:  [x] Yes  [] No    Narciso Lopez Rep   10/15/21 10:36 EDT

## 2021-12-17 ENCOUNTER — TELEPHONE (OUTPATIENT)
Dept: INTERNAL MEDICINE | Facility: CLINIC | Age: 76
End: 2021-12-17

## 2021-12-17 RX ORDER — PANTOPRAZOLE SODIUM 40 MG/1
TABLET, DELAYED RELEASE ORAL
Status: CANCELLED | OUTPATIENT
Start: 2021-12-17

## 2021-12-17 NOTE — TELEPHONE ENCOUNTER
Caller: Nancie Grissom    Relationship: Self    Best call back number: [phone]    Requested Prescriptions:   Requested Prescriptions     Pending Prescriptions Disp Refills   • pantoprazole (Protonix) 40 MG EC tablet          Pharmacy where request should be sent: [id number] - [place], [place] - [address] [facility] AT [facility] OF [address] & [place] - [phone]  - [phone] FX     Additional details provided by patient: PATIENT'S ORIGINAL PHARMACY STOPPED ACCEPTING  AND PATIENT NEEDS TO SWITCH PHARMACY. SHE IS OUT OF THIS MEDICATION AND NEEDS ASAP. PLEASE CALL WHEN ORDERS ARE PLACED.     Does the patient have less than a 3 day supply:  [x] Yes  [] No    Narciso Terry Rep   12/17/21 10:17 EST

## 2021-12-20 RX ORDER — PANTOPRAZOLE SODIUM 40 MG/1
40 TABLET, DELAYED RELEASE ORAL DAILY
Qty: 30 TABLET | Refills: 0 | Status: SHIPPED | OUTPATIENT
Start: 2021-12-20 | End: 2022-07-19 | Stop reason: SDUPTHER

## 2022-01-18 NOTE — TELEPHONE ENCOUNTER
Caller: Nancie Grissom    Relationship: Self    Best call back number: [phone]    Requested Prescriptions:   Requested Prescriptions     Pending Prescriptions Disp Refills   • levothyroxine (Synthroid) 88 MCG tablet 90 tablet 1     Sig: Take 1 tablet by mouth Daily.        Pharmacy where request should be sent: [address] [phone] [facility] [phone] FX     Does the patient have less than a 3 day supply:  [x] Yes  [] No    Narciso Bryant Rep   01/18/22 11:10 EST

## 2022-01-19 RX ORDER — LEVOTHYROXINE SODIUM 88 UG/1
88 TABLET ORAL DAILY
Qty: 90 TABLET | Refills: 1 | Status: SHIPPED | OUTPATIENT
Start: 2022-01-19 | End: 2022-01-21 | Stop reason: SDUPTHER

## 2022-01-21 ENCOUNTER — TELEPHONE (OUTPATIENT)
Dept: INTERNAL MEDICINE | Facility: CLINIC | Age: 77
End: 2022-01-21

## 2022-01-21 RX ORDER — TRAZODONE HYDROCHLORIDE 100 MG/1
100 TABLET ORAL DAILY
Qty: 90 TABLET | Refills: 1 | Status: SHIPPED | OUTPATIENT
Start: 2022-01-21 | End: 2022-07-18

## 2022-01-21 RX ORDER — ATORVASTATIN CALCIUM 20 MG/1
20 TABLET, FILM COATED ORAL NIGHTLY
Qty: 90 TABLET | Refills: 1 | Status: SHIPPED | OUTPATIENT
Start: 2022-01-21 | End: 2022-07-19 | Stop reason: SDUPTHER

## 2022-01-21 RX ORDER — LEVOTHYROXINE SODIUM 88 UG/1
88 TABLET ORAL DAILY
Qty: 90 TABLET | Refills: 1 | Status: SHIPPED | OUTPATIENT
Start: 2022-01-21 | End: 2022-07-19 | Stop reason: SDUPTHER

## 2022-01-21 RX ORDER — LEVOTHYROXINE SODIUM 88 UG/1
88 TABLET ORAL DAILY
Qty: 90 TABLET | Refills: 1 | Status: SHIPPED | OUTPATIENT
Start: 2022-01-21 | End: 2022-01-21 | Stop reason: SDUPTHER

## 2022-01-21 NOTE — TELEPHONE ENCOUNTER
Hub staff attempted to follow warm transfer process and was unsuccessful     Caller: Nancie Grissom    Relationship to patient: Self    Best call back number: [phone]    Patient is needing:       PATIENT STATED SHE CALLED ON 1/18/22 TO HAVE HER PRESCRIPTION REFILLED AND IT WAS SENT TO THE WRONG PHARMACY. PATIENT CALLED TO GET HER PRESCRIPTION CHANGED TO [facility]'S PHARMACY AND IS WANTING THAT MEDICATION SENT OVER IMMEDIATELY AND REQUESTING PCP CALL ONCE THIS HAS BEEN DONE. PLEASE CALL AND ADVISE

## 2022-05-19 RX ORDER — PANTOPRAZOLE SODIUM 40 MG/1
40 TABLET, DELAYED RELEASE ORAL DAILY
Qty: 30 TABLET | Refills: 0 | OUTPATIENT
Start: 2022-05-19

## 2022-07-11 ENCOUNTER — TELEPHONE (OUTPATIENT)
Dept: INTERNAL MEDICINE | Facility: CLINIC | Age: 77
End: 2022-07-11

## 2022-07-11 NOTE — TELEPHONE ENCOUNTER
Caller: Nancie Grissom    Relationship to patient: Self    Best call back number: [phone]        Type of visit: OFFICE VISIT, MEDICATION REFILLS         Additional notes: FYI  PATIENT CALLING REQUESTING A APPOINTMENT, WITH PCP FOR MEDICATION REFILLS, NO APPOINTMENT AVAILABLE UNTIL 10/2022, SCHEDULED PATIENT WITH LARRY FLORENTINO

## 2022-07-18 ENCOUNTER — OFFICE VISIT (OUTPATIENT)
Dept: INTERNAL MEDICINE | Facility: CLINIC | Age: 77
End: 2022-07-18

## 2022-07-18 VITALS
OXYGEN SATURATION: 97 % | WEIGHT: 159.6 LBS | SYSTOLIC BLOOD PRESSURE: 144 MMHG | BODY MASS INDEX: 29.37 KG/M2 | HEIGHT: 62 IN | TEMPERATURE: 98.2 F | HEART RATE: 94 BPM | DIASTOLIC BLOOD PRESSURE: 76 MMHG

## 2022-07-18 DIAGNOSIS — K21.9 GASTROESOPHAGEAL REFLUX DISEASE WITHOUT ESOPHAGITIS: ICD-10-CM

## 2022-07-18 DIAGNOSIS — Z13.220 LIPID SCREENING: ICD-10-CM

## 2022-07-18 DIAGNOSIS — Z00.00 ANNUAL PHYSICAL EXAM: ICD-10-CM

## 2022-07-18 DIAGNOSIS — E03.9 HYPOTHYROIDISM, UNSPECIFIED TYPE: Primary | ICD-10-CM

## 2022-07-18 DIAGNOSIS — F33.41 RECURRENT MAJOR DEPRESSIVE DISORDER, IN PARTIAL REMISSION: ICD-10-CM

## 2022-07-18 DIAGNOSIS — I10 PRIMARY HYPERTENSION: ICD-10-CM

## 2022-07-18 DIAGNOSIS — E78.5 HYPERLIPIDEMIA, UNSPECIFIED HYPERLIPIDEMIA TYPE: ICD-10-CM

## 2022-07-18 LAB
ALBUMIN SERPL-MCNC: 4.5 G/DL (ref 3.5–5.2)
ALBUMIN/GLOB SERPL: 2 G/DL
ALP SERPL-CCNC: 65 U/L (ref 39–117)
ALT SERPL W P-5'-P-CCNC: 14 U/L (ref 1–33)
ANION GAP SERPL CALCULATED.3IONS-SCNC: 10.1 MMOL/L (ref 5–15)
AST SERPL-CCNC: 20 U/L (ref 1–32)
BASOPHILS # BLD AUTO: 0.06 10*3/MM3 (ref 0–0.2)
BASOPHILS NFR BLD AUTO: 0.8 % (ref 0–1.5)
BILIRUB SERPL-MCNC: 0.4 MG/DL (ref 0–1.2)
BUN SERPL-MCNC: 12 MG/DL (ref 8–23)
BUN/CREAT SERPL: 11.8 (ref 7–25)
CALCIUM SPEC-SCNC: 9.2 MG/DL (ref 8.6–10.5)
CHLORIDE SERPL-SCNC: 106 MMOL/L (ref 98–107)
CHOLEST SERPL-MCNC: 160 MG/DL (ref 0–200)
CO2 SERPL-SCNC: 23.9 MMOL/L (ref 22–29)
CREAT SERPL-MCNC: 1.02 MG/DL (ref 0.57–1)
DEPRECATED RDW RBC AUTO: 43 FL (ref 37–54)
EGFRCR SERPLBLD CKD-EPI 2021: 56.1 ML/MIN/1.73
EOSINOPHIL # BLD AUTO: 0.13 10*3/MM3 (ref 0–0.4)
EOSINOPHIL NFR BLD AUTO: 1.8 % (ref 0.3–6.2)
ERYTHROCYTE [DISTWIDTH] IN BLOOD BY AUTOMATED COUNT: 13.4 % (ref 12.3–15.4)
GLOBULIN UR ELPH-MCNC: 2.3 GM/DL
GLUCOSE SERPL-MCNC: 80 MG/DL (ref 65–99)
HCT VFR BLD AUTO: 39.6 % (ref 34–46.6)
HDLC SERPL-MCNC: 44 MG/DL (ref 40–60)
HGB BLD-MCNC: 13.3 G/DL (ref 12–15.9)
IMM GRANULOCYTES # BLD AUTO: 0.02 10*3/MM3 (ref 0–0.05)
IMM GRANULOCYTES NFR BLD AUTO: 0.3 % (ref 0–0.5)
LDLC SERPL CALC-MCNC: 86 MG/DL (ref 0–100)
LDLC/HDLC SERPL: 1.85 {RATIO}
LYMPHOCYTES # BLD AUTO: 1.47 10*3/MM3 (ref 0.7–3.1)
LYMPHOCYTES NFR BLD AUTO: 20.3 % (ref 19.6–45.3)
MCH RBC QN AUTO: 30.1 PG (ref 26.6–33)
MCHC RBC AUTO-ENTMCNC: 33.6 G/DL (ref 31.5–35.7)
MCV RBC AUTO: 89.6 FL (ref 79–97)
MONOCYTES # BLD AUTO: 0.55 10*3/MM3 (ref 0.1–0.9)
MONOCYTES NFR BLD AUTO: 7.6 % (ref 5–12)
NEUTROPHILS NFR BLD AUTO: 5.02 10*3/MM3 (ref 1.7–7)
NEUTROPHILS NFR BLD AUTO: 69.2 % (ref 42.7–76)
NRBC BLD AUTO-RTO: 0 /100 WBC (ref 0–0.2)
PLATELET # BLD AUTO: 184 10*3/MM3 (ref 140–450)
PMV BLD AUTO: 10.8 FL (ref 6–12)
POTASSIUM SERPL-SCNC: 4.2 MMOL/L (ref 3.5–5.2)
PROT SERPL-MCNC: 6.8 G/DL (ref 6–8.5)
RBC # BLD AUTO: 4.42 10*6/MM3 (ref 3.77–5.28)
SODIUM SERPL-SCNC: 140 MMOL/L (ref 136–145)
T4 FREE SERPL-MCNC: 1.46 NG/DL (ref 0.93–1.7)
TRIGL SERPL-MCNC: 174 MG/DL (ref 0–150)
TSH SERPL DL<=0.05 MIU/L-ACNC: 0.29 UIU/ML (ref 0.27–4.2)
VLDLC SERPL-MCNC: 30 MG/DL (ref 5–40)
WBC NRBC COR # BLD: 7.25 10*3/MM3 (ref 3.4–10.8)

## 2022-07-18 PROCEDURE — 99214 OFFICE O/P EST MOD 30 MIN: CPT | Performed by: NURSE PRACTITIONER

## 2022-07-18 PROCEDURE — 80053 COMPREHEN METABOLIC PANEL: CPT | Performed by: NURSE PRACTITIONER

## 2022-07-18 PROCEDURE — 80061 LIPID PANEL: CPT | Performed by: NURSE PRACTITIONER

## 2022-07-18 PROCEDURE — 85025 COMPLETE CBC W/AUTO DIFF WBC: CPT | Performed by: NURSE PRACTITIONER

## 2022-07-18 PROCEDURE — 84439 ASSAY OF FREE THYROXINE: CPT | Performed by: NURSE PRACTITIONER

## 2022-07-18 PROCEDURE — 84443 ASSAY THYROID STIM HORMONE: CPT | Performed by: NURSE PRACTITIONER

## 2022-07-18 PROCEDURE — 36415 COLL VENOUS BLD VENIPUNCTURE: CPT | Performed by: NURSE PRACTITIONER

## 2022-07-18 RX ORDER — HYDROCHLOROTHIAZIDE 25 MG/1
25 TABLET ORAL DAILY
Qty: 90 TABLET | Refills: 1 | Status: CANCELLED | OUTPATIENT
Start: 2022-07-18

## 2022-07-18 RX ORDER — LEVOTHYROXINE SODIUM 88 UG/1
88 TABLET ORAL DAILY
Qty: 90 TABLET | Refills: 1 | Status: CANCELLED | OUTPATIENT
Start: 2022-07-18

## 2022-07-18 RX ORDER — PANTOPRAZOLE SODIUM 40 MG/1
40 TABLET, DELAYED RELEASE ORAL DAILY
Qty: 30 TABLET | Refills: 0 | Status: CANCELLED | OUTPATIENT
Start: 2022-07-18

## 2022-07-19 PROBLEM — F32.A DEPRESSION: Status: ACTIVE | Noted: 2022-07-19

## 2022-07-19 PROBLEM — E55.9 VITAMIN D DEFICIENCY: Status: ACTIVE | Noted: 2022-07-19

## 2022-07-19 PROBLEM — Z00.00 ANNUAL PHYSICAL EXAM: Status: ACTIVE | Noted: 2022-07-19

## 2022-07-19 PROBLEM — I10 HYPERTENSION: Status: ACTIVE | Noted: 2022-07-19

## 2022-07-19 PROBLEM — M81.0 OSTEOPOROSIS: Status: ACTIVE | Noted: 2022-07-19

## 2022-07-19 PROBLEM — E03.9 HYPOTHYROIDISM: Status: ACTIVE | Noted: 2022-07-19

## 2022-07-19 PROBLEM — E78.5 HYPERLIPIDEMIA: Status: ACTIVE | Noted: 2022-07-19

## 2022-07-19 RX ORDER — ATORVASTATIN CALCIUM 20 MG/1
20 TABLET, FILM COATED ORAL NIGHTLY
Qty: 90 TABLET | Refills: 1 | OUTPATIENT
Start: 2022-07-19

## 2022-07-19 RX ORDER — HYDROCHLOROTHIAZIDE 25 MG/1
25 TABLET ORAL DAILY
Qty: 90 TABLET | Refills: 1 | Status: SHIPPED | OUTPATIENT
Start: 2022-07-19 | End: 2022-12-27 | Stop reason: SDUPTHER

## 2022-07-19 RX ORDER — TRAZODONE HYDROCHLORIDE 100 MG/1
100 TABLET ORAL DAILY
Qty: 90 TABLET | Refills: 1 | OUTPATIENT
Start: 2022-07-19

## 2022-07-19 RX ORDER — PANTOPRAZOLE SODIUM 40 MG/1
40 TABLET, DELAYED RELEASE ORAL DAILY
Qty: 30 TABLET | Refills: 0 | Status: SHIPPED | OUTPATIENT
Start: 2022-07-19 | End: 2022-07-29 | Stop reason: SDUPTHER

## 2022-07-19 RX ORDER — ATORVASTATIN CALCIUM 20 MG/1
20 TABLET, FILM COATED ORAL NIGHTLY
Qty: 90 TABLET | Refills: 1 | Status: SHIPPED | OUTPATIENT
Start: 2022-07-19 | End: 2022-12-27 | Stop reason: SDUPTHER

## 2022-07-19 RX ORDER — LEVOTHYROXINE SODIUM 88 UG/1
88 TABLET ORAL DAILY
Qty: 90 TABLET | Refills: 1 | Status: SHIPPED | OUTPATIENT
Start: 2022-07-19 | End: 2022-12-27 | Stop reason: SDUPTHER

## 2022-07-19 RX ORDER — LISINOPRIL 10 MG/1
10 TABLET ORAL DAILY
Qty: 90 TABLET | Refills: 0 | Status: SHIPPED | OUTPATIENT
Start: 2022-07-19 | End: 2022-10-17 | Stop reason: SDUPTHER

## 2022-07-19 RX ORDER — AMITRIPTYLINE HYDROCHLORIDE 10 MG/1
10 TABLET, FILM COATED ORAL NIGHTLY
Qty: 90 TABLET | Refills: 0 | Status: SHIPPED | OUTPATIENT
Start: 2022-07-19 | End: 2022-08-02

## 2022-07-19 NOTE — TELEPHONE ENCOUNTER
Statin was filled yesterday when she was seen in office.     She was started on amitriptyline yesterday which can interact with the trazodone. Pt seen yesterday and discussed wanting to dc trazodone.

## 2022-07-19 NOTE — ASSESSMENT & PLAN NOTE
Patient does struggle with depression, grief.  She lost her  5 years ago, and admits that she still struggles with his loss.  She does get by day today, she does not have any thoughts or plans to harm herself, but she does miss him quite significantly.  She denies need for any medication and/or therapy.  I encouraged her to always feel free to discuss with us, we could provide her list of counselors/therapist, we could just ask medications if she ever felt like that would be of assistance.  We will continue to monitor at this time.  No thoughts of self-harm at this time.

## 2022-07-19 NOTE — ASSESSMENT & PLAN NOTE
Patient currently only on hydrochlorothiazide daily.  We will check labs today, we may need to discuss adding lisinopril to her regimen, she is slightly elevated today, does not check her blood pressure very frequently.  We will follow-up based on lab results.

## 2022-07-19 NOTE — PROGRESS NOTES
"Chief Complaint  medication managment (Stomach problems after taking trazadone for a couple of years )    Subjective         Nancie Grissom presents to [facility] for Annual physical exam, follow-up for hypertension, hyperlipidemia, hypothyroidism, GERD.    Patient is here today for regular follow-up per her report, looking at records she has not been seen since May 2021.  Patient denies that she needs frequent visits, she does quite well on her medication regimen.  She has not had any labs checked since that time either.  She has been able to get her medications refilled without any issues.  Patient reports good compliance with all of her medication regimen.  She denies any significant symptoms other than some grief, she did lose her  5 years ago, she continues to grieve the loss of him.  She reports some depression, she does not report any significant anxiety or thoughts of self-harm.  She has been trying to lose weight, she has found it somewhat difficult, she is concerned regarding her thyroid and would like her levels checked.  She feels like she eats overall pretty healthy, but she does not eat a lot, she usually has a slice of bread in the morning, and then does not eat again until her dinner meal.  She feels like her meals are healthy.    Otherwise, patient does not have any significant concerns or complaints.         Review of Systems    Objective   Vital Signs:   /76 (BP Location: Right arm, Patient Position: Sitting, Cuff Size: Adult)   Pulse 94   Temp 98.2 °F (36.8 °C) (Temporal)   Ht 156.2 cm (61.5\")   Wt 72.4 kg (159 lb 9.6 oz)   SpO2 97%   BMI 29.67 kg/m²     Physical Exam  Vitals and nursing note reviewed.   Constitutional:       Appearance: Normal appearance.   HENT:      Head: Normocephalic and atraumatic.   Cardiovascular:      Rate and Rhythm: Normal rate and regular rhythm.   Pulmonary:      Effort: Pulmonary effort is normal.      Breath sounds: Normal " breath sounds.   Neurological:      Mental Status: She is alert.   Psychiatric:         Mood and Affect: Mood normal.         Thought Content: Thought content normal.        Result Review :                   Diagnoses and all orders for this visit:    1. Hypothyroidism, unspecified type (Primary)  Assessment & Plan:  Patient takes medication with good compliance, she has not had any doses changes in quite some time.  Has not had any lab work done since May 2021.  We will check labs today, adjust medications based on results.    Orders:  -     TSH  -     T4, Free    2. Annual physical exam  -     Comprehensive Metabolic Panel  -     CBC & Differential    3. Lipid screening  -     Lipid Panel    4. Hyperlipidemia, unspecified hyperlipidemia type  Assessment & Plan:  Patient currently taking atorvastatin 20 mg daily, we will check labs today, none checked since 2021.  Will adjust and address as needed based on results.      5. Primary hypertension  Assessment & Plan:  Patient currently only on hydrochlorothiazide daily.  We will check labs today, we may need to discuss adding lisinopril to her regimen, she is slightly elevated today, does not check her blood pressure very frequently.  We will follow-up based on lab results.      6. Gastroesophageal reflux disease without esophagitis  Assessment & Plan:  Patient is currently on Protonix, she takes an every other day regimen, this was recommended several years ago.  She reports good compliance with that regimen, she does not report any breakthrough symptoms.  We will continue this for now, if she has any questions or concerns, always encouraged to reach out.      7. Recurrent major depressive disorder, in partial remission (HCC)  Assessment & Plan:  Patient does struggle with depression, grief.  She lost her  5 years ago, and admits that she still struggles with his loss.  She does get by day today, she does not have any thoughts or plans to harm herself, but she  does miss him quite significantly.  She denies need for any medication and/or therapy.  I encouraged her to always feel free to discuss with us, we could provide her list of counselors/therapist, we could just ask medications if she ever felt like that would be of assistance.  We will continue to monitor at this time.  No thoughts of self-harm at this time.      Other orders  -     atorvastatin (LIPITOR) 20 MG tablet; Take 1 tablet by mouth Every Night.  Dispense: 90 tablet; Refill: 1  -     hydroCHLOROthiazide (HYDRODIURIL) 25 MG tablet; Take 1 tablet by mouth Daily.  Dispense: 90 tablet; Refill: 1  -     Diclofenac Sodium (VOLTAREN) 1 % gel gel; Apply  topically to the appropriate area as directed 4 (Four) Times a Day.  Dispense: 150 g; Refill: 1  -     levothyroxine (Synthroid) 88 MCG tablet; Take 1 tablet by mouth Daily.  Dispense: 90 tablet; Refill: 1  -     pantoprazole (Protonix) 40 MG EC tablet; Take 1 tablet by mouth Daily.  Dispense: 30 tablet; Refill: 0  -     amitriptyline (ELAVIL) 10 MG tablet; Take 1 tablet by mouth Every Night.  Dispense: 90 tablet; Refill: 0        Follow Up   Return in about 3 months (around 10/18/2022) for Recheck, Medicare Wellness.  Patient was given instructions and counseling regarding her condition or for health maintenance advice. Please see specific information pulled into the AVS if appropriate.     Jonathan Krishna, APRN  7/19/2022  This note was electronically signed.

## 2022-07-19 NOTE — ASSESSMENT & PLAN NOTE
Patient takes medication with good compliance, she has not had any doses changes in quite some time.  Has not had any lab work done since May 2021.  We will check labs today, adjust medications based on results.

## 2022-07-19 NOTE — ASSESSMENT & PLAN NOTE
Patient is currently on Protonix, she takes an every other day regimen, this was recommended several years ago.  She reports good compliance with that regimen, she does not report any breakthrough symptoms.  We will continue this for now, if she has any questions or concerns, always encouraged to reach out.

## 2022-07-21 ENCOUNTER — TELEPHONE (OUTPATIENT)
Dept: INTERNAL MEDICINE | Facility: CLINIC | Age: 77
End: 2022-07-21

## 2022-07-21 NOTE — TELEPHONE ENCOUNTER
Caller: Nancie Grissom    Relationship: Self    Best call back number: [phone]    What is the best time to reach you: ANY TIME     Who are you requesting to speak with (clinical staff, provider,  specific staff member): DOV WAGGONER         What was the call regarding: PATIENT HAS SOME INFORMATION THAT SHE WANTS TO PROVIDE WITH THE PROVIDER     Do you require a callback: YES

## 2022-07-21 NOTE — TELEPHONE ENCOUNTER
Patient called in to inform Jonathan of the sleeping medication that she has been taking at night, this medication was her  husbands that [facility] was [facility] in. The medications name is Zolpidem and has been helping her sleep at night. She also stated that at her last appointment something was supposed to be sent in for her stomach it looks like protonix was sent in but I would like to clarify if this was sent in for her stomach problems.

## 2022-07-29 ENCOUNTER — TELEPHONE (OUTPATIENT)
Dept: INTERNAL MEDICINE | Facility: CLINIC | Age: 77
End: 2022-07-29

## 2022-07-29 DIAGNOSIS — K21.9 GASTROESOPHAGEAL REFLUX DISEASE WITHOUT ESOPHAGITIS: Primary | ICD-10-CM

## 2022-07-29 RX ORDER — PANTOPRAZOLE SODIUM 40 MG/1
40 TABLET, DELAYED RELEASE ORAL DAILY
Qty: 90 TABLET | Refills: 1 | Status: SHIPPED | OUTPATIENT
Start: 2022-07-29 | End: 2022-12-27 | Stop reason: SDUPTHER

## 2022-08-01 ENCOUNTER — PRIOR AUTHORIZATION (OUTPATIENT)
Dept: INTERNAL MEDICINE | Facility: CLINIC | Age: 77
End: 2022-08-01

## 2022-08-02 RX ORDER — TRAZODONE HYDROCHLORIDE 100 MG/1
100 TABLET ORAL DAILY
Qty: 90 TABLET | Refills: 1 | Status: SHIPPED | OUTPATIENT
Start: 2022-08-02 | End: 2022-12-28

## 2022-10-17 ENCOUNTER — TELEPHONE (OUTPATIENT)
Dept: INTERNAL MEDICINE | Facility: CLINIC | Age: 77
End: 2022-10-17

## 2022-10-17 RX ORDER — LISINOPRIL 10 MG/1
10 TABLET ORAL DAILY
Qty: 90 TABLET | Refills: 1 | Status: SHIPPED | OUTPATIENT
Start: 2022-10-17 | End: 2022-12-27 | Stop reason: SDUPTHER

## 2022-10-17 RX ORDER — AMITRIPTYLINE HYDROCHLORIDE 10 MG/1
10 TABLET, FILM COATED ORAL NIGHTLY
Qty: 90 TABLET | Refills: 0 | Status: SHIPPED | OUTPATIENT
Start: 2022-10-17 | End: 2022-12-27 | Stop reason: SDUPTHER

## 2022-10-17 RX ORDER — LISINOPRIL 10 MG/1
10 TABLET ORAL DAILY
Qty: 90 TABLET | Refills: 0 | Status: SHIPPED | OUTPATIENT
Start: 2022-10-17 | End: 2022-12-27 | Stop reason: SDUPTHER

## 2022-10-17 NOTE — TELEPHONE ENCOUNTER
Caller: Nancie Grissom    Relationship: Self    Best call back number: [phone]    Requested Prescriptions: amitriptyline 10 mg     Pharmacy where request should be sent: [id number] - [place], [place] - [address] [facility] AT [facility] OF [address] /[facility] & [place] - [phone] [facility] [phone] FX     Does the patient have less than a 3 day supply:  [x] Yes  [] No

## 2022-10-17 NOTE — TELEPHONE ENCOUNTER
Called patient she states she still takes both of these medications and verified dose. She is on her last pill amitriptyline. Okay to send?

## 2022-10-17 NOTE — TELEPHONE ENCOUNTER
"Called patient regarding medication refil.     She was started on amitriptyline in July and is requesting refill of this medication today. Called patient to find out if amitriptyline has been working for her, states when she first started the medication she felt like she was crawling out of her skin, but is now working well for her. States she still sleep very little but is \"fine other than that\".     Refilled meds.     Informed pt she is due for her AWV and she is planning to schedule this when she comes in for her flu shot next week.     Wen Leblanc MD     "

## 2022-10-26 ENCOUNTER — CLINICAL SUPPORT (OUTPATIENT)
Dept: INTERNAL MEDICINE | Facility: CLINIC | Age: 77
End: 2022-10-26

## 2022-10-26 DIAGNOSIS — Z23 NEED FOR INFLUENZA VACCINATION: Primary | ICD-10-CM

## 2022-10-26 PROCEDURE — 90662 IIV NO PRSV INCREASED AG IM: CPT | Performed by: STUDENT IN AN ORGANIZED HEALTH CARE EDUCATION/TRAINING PROGRAM

## 2022-10-26 PROCEDURE — G0008 ADMIN INFLUENZA VIRUS VAC: HCPCS | Performed by: STUDENT IN AN ORGANIZED HEALTH CARE EDUCATION/TRAINING PROGRAM

## 2022-11-29 ENCOUNTER — TELEPHONE (OUTPATIENT)
Dept: INTERNAL MEDICINE | Facility: CLINIC | Age: 77
End: 2022-11-29

## 2022-12-27 ENCOUNTER — OFFICE VISIT (OUTPATIENT)
Dept: INTERNAL MEDICINE | Facility: CLINIC | Age: 77
End: 2022-12-27

## 2022-12-27 VITALS
HEIGHT: 62 IN | TEMPERATURE: 97.8 F | WEIGHT: 156.25 LBS | HEART RATE: 97 BPM | BODY MASS INDEX: 28.75 KG/M2 | DIASTOLIC BLOOD PRESSURE: 74 MMHG | OXYGEN SATURATION: 98 % | SYSTOLIC BLOOD PRESSURE: 126 MMHG

## 2022-12-27 DIAGNOSIS — E55.9 VITAMIN D DEFICIENCY, UNSPECIFIED: ICD-10-CM

## 2022-12-27 DIAGNOSIS — Z13.31 DEPRESSION SCREENING NEGATIVE: ICD-10-CM

## 2022-12-27 DIAGNOSIS — Z71.89 ACP (ADVANCE CARE PLANNING): ICD-10-CM

## 2022-12-27 DIAGNOSIS — Z11.59 ENCOUNTER FOR HEPATITIS C SCREENING TEST FOR LOW RISK PATIENT: ICD-10-CM

## 2022-12-27 DIAGNOSIS — M85.88 OTHER SPECIFIED DISORDERS OF BONE DENSITY AND STRUCTURE, OTHER SITE: ICD-10-CM

## 2022-12-27 DIAGNOSIS — Z91.81 AT LOW RISK FOR FALL: ICD-10-CM

## 2022-12-27 DIAGNOSIS — Z12.31 BREAST CANCER SCREENING BY MAMMOGRAM: ICD-10-CM

## 2022-12-27 DIAGNOSIS — M19.041 ARTHRITIS OF BOTH HANDS: ICD-10-CM

## 2022-12-27 DIAGNOSIS — Z76.0 MEDICATION REFILL: ICD-10-CM

## 2022-12-27 DIAGNOSIS — Z00.00 MEDICARE ANNUAL WELLNESS VISIT, SUBSEQUENT: Primary | ICD-10-CM

## 2022-12-27 DIAGNOSIS — E78.5 HYPERLIPIDEMIA, UNSPECIFIED HYPERLIPIDEMIA TYPE: ICD-10-CM

## 2022-12-27 DIAGNOSIS — M19.042 ARTHRITIS OF BOTH HANDS: ICD-10-CM

## 2022-12-27 DIAGNOSIS — M85.80 OSTEOPENIA, UNSPECIFIED LOCATION: ICD-10-CM

## 2022-12-27 DIAGNOSIS — E03.9 HYPOTHYROIDISM, UNSPECIFIED TYPE: ICD-10-CM

## 2022-12-27 LAB
25(OH)D3 SERPL-MCNC: 38.8 NG/ML (ref 30–100)
ALBUMIN SERPL-MCNC: 4.6 G/DL (ref 3.5–5.2)
ALBUMIN/GLOB SERPL: 1.7 G/DL
ALP SERPL-CCNC: 84 U/L (ref 39–117)
ALT SERPL W P-5'-P-CCNC: 15 U/L (ref 1–33)
ANION GAP SERPL CALCULATED.3IONS-SCNC: 12.2 MMOL/L (ref 5–15)
AST SERPL-CCNC: 20 U/L (ref 1–32)
BASOPHILS # BLD AUTO: 0.05 10*3/MM3 (ref 0–0.2)
BASOPHILS NFR BLD AUTO: 0.7 % (ref 0–1.5)
BILIRUB SERPL-MCNC: 0.4 MG/DL (ref 0–1.2)
BUN SERPL-MCNC: 18 MG/DL (ref 8–23)
BUN/CREAT SERPL: 17 (ref 7–25)
CALCIUM SPEC-SCNC: 9.2 MG/DL (ref 8.6–10.5)
CHLORIDE SERPL-SCNC: 99 MMOL/L (ref 98–107)
CHOLEST SERPL-MCNC: 147 MG/DL (ref 0–200)
CO2 SERPL-SCNC: 24.8 MMOL/L (ref 22–29)
CREAT SERPL-MCNC: 1.06 MG/DL (ref 0.57–1)
CRP SERPL-MCNC: 0.72 MG/DL (ref 0–0.5)
DEPRECATED RDW RBC AUTO: 46.4 FL (ref 37–54)
EGFRCR SERPLBLD CKD-EPI 2021: 53.5 ML/MIN/1.73
EOSINOPHIL # BLD AUTO: 0.32 10*3/MM3 (ref 0–0.4)
EOSINOPHIL NFR BLD AUTO: 4.2 % (ref 0.3–6.2)
ERYTHROCYTE [DISTWIDTH] IN BLOOD BY AUTOMATED COUNT: 13.5 % (ref 12.3–15.4)
ERYTHROCYTE [SEDIMENTATION RATE] IN BLOOD: 30 MM/HR (ref 0–30)
GLOBULIN UR ELPH-MCNC: 2.7 GM/DL
GLUCOSE SERPL-MCNC: 91 MG/DL (ref 65–99)
HCT VFR BLD AUTO: 41.4 % (ref 34–46.6)
HCV AB SER DONR QL: NORMAL
HDLC SERPL-MCNC: 40 MG/DL (ref 40–60)
HGB BLD-MCNC: 13.7 G/DL (ref 12–15.9)
IMM GRANULOCYTES # BLD AUTO: 0.03 10*3/MM3 (ref 0–0.05)
IMM GRANULOCYTES NFR BLD AUTO: 0.4 % (ref 0–0.5)
LDLC SERPL CALC-MCNC: 78 MG/DL (ref 0–100)
LDLC/HDLC SERPL: 1.82 {RATIO}
LYMPHOCYTES # BLD AUTO: 1.3 10*3/MM3 (ref 0.7–3.1)
LYMPHOCYTES NFR BLD AUTO: 17.2 % (ref 19.6–45.3)
MCH RBC QN AUTO: 30.7 PG (ref 26.6–33)
MCHC RBC AUTO-ENTMCNC: 33.1 G/DL (ref 31.5–35.7)
MCV RBC AUTO: 92.8 FL (ref 79–97)
MONOCYTES # BLD AUTO: 0.45 10*3/MM3 (ref 0.1–0.9)
MONOCYTES NFR BLD AUTO: 5.9 % (ref 5–12)
NEUTROPHILS NFR BLD AUTO: 5.43 10*3/MM3 (ref 1.7–7)
NEUTROPHILS NFR BLD AUTO: 71.6 % (ref 42.7–76)
NRBC BLD AUTO-RTO: 0 /100 WBC (ref 0–0.2)
PLATELET # BLD AUTO: 216 10*3/MM3 (ref 140–450)
PMV BLD AUTO: 11.4 FL (ref 6–12)
POTASSIUM SERPL-SCNC: 4 MMOL/L (ref 3.5–5.2)
PROT SERPL-MCNC: 7.3 G/DL (ref 6–8.5)
RBC # BLD AUTO: 4.46 10*6/MM3 (ref 3.77–5.28)
SODIUM SERPL-SCNC: 136 MMOL/L (ref 136–145)
T4 FREE SERPL-MCNC: 1.77 NG/DL (ref 0.93–1.7)
TRIGL SERPL-MCNC: 171 MG/DL (ref 0–150)
TSH SERPL DL<=0.05 MIU/L-ACNC: 0.21 UIU/ML (ref 0.27–4.2)
VLDLC SERPL-MCNC: 29 MG/DL (ref 5–40)
WBC NRBC COR # BLD: 7.58 10*3/MM3 (ref 3.4–10.8)

## 2022-12-27 PROCEDURE — 1159F MED LIST DOCD IN RCRD: CPT | Performed by: STUDENT IN AN ORGANIZED HEALTH CARE EDUCATION/TRAINING PROGRAM

## 2022-12-27 PROCEDURE — 86038 ANTINUCLEAR ANTIBODIES: CPT | Performed by: STUDENT IN AN ORGANIZED HEALTH CARE EDUCATION/TRAINING PROGRAM

## 2022-12-27 PROCEDURE — 80053 COMPREHEN METABOLIC PANEL: CPT | Performed by: STUDENT IN AN ORGANIZED HEALTH CARE EDUCATION/TRAINING PROGRAM

## 2022-12-27 PROCEDURE — 86225 DNA ANTIBODY NATIVE: CPT | Performed by: STUDENT IN AN ORGANIZED HEALTH CARE EDUCATION/TRAINING PROGRAM

## 2022-12-27 PROCEDURE — 84439 ASSAY OF FREE THYROXINE: CPT | Performed by: STUDENT IN AN ORGANIZED HEALTH CARE EDUCATION/TRAINING PROGRAM

## 2022-12-27 PROCEDURE — G0439 PPPS, SUBSEQ VISIT: HCPCS | Performed by: STUDENT IN AN ORGANIZED HEALTH CARE EDUCATION/TRAINING PROGRAM

## 2022-12-27 PROCEDURE — 36415 COLL VENOUS BLD VENIPUNCTURE: CPT | Performed by: STUDENT IN AN ORGANIZED HEALTH CARE EDUCATION/TRAINING PROGRAM

## 2022-12-27 PROCEDURE — 80061 LIPID PANEL: CPT | Performed by: STUDENT IN AN ORGANIZED HEALTH CARE EDUCATION/TRAINING PROGRAM

## 2022-12-27 PROCEDURE — 84443 ASSAY THYROID STIM HORMONE: CPT | Performed by: STUDENT IN AN ORGANIZED HEALTH CARE EDUCATION/TRAINING PROGRAM

## 2022-12-27 PROCEDURE — 82306 VITAMIN D 25 HYDROXY: CPT | Performed by: STUDENT IN AN ORGANIZED HEALTH CARE EDUCATION/TRAINING PROGRAM

## 2022-12-27 PROCEDURE — 85652 RBC SED RATE AUTOMATED: CPT | Performed by: STUDENT IN AN ORGANIZED HEALTH CARE EDUCATION/TRAINING PROGRAM

## 2022-12-27 PROCEDURE — 86803 HEPATITIS C AB TEST: CPT | Performed by: STUDENT IN AN ORGANIZED HEALTH CARE EDUCATION/TRAINING PROGRAM

## 2022-12-27 PROCEDURE — 85025 COMPLETE CBC W/AUTO DIFF WBC: CPT | Performed by: STUDENT IN AN ORGANIZED HEALTH CARE EDUCATION/TRAINING PROGRAM

## 2022-12-27 PROCEDURE — 99214 OFFICE O/P EST MOD 30 MIN: CPT | Performed by: STUDENT IN AN ORGANIZED HEALTH CARE EDUCATION/TRAINING PROGRAM

## 2022-12-27 PROCEDURE — 1170F FXNL STATUS ASSESSED: CPT | Performed by: STUDENT IN AN ORGANIZED HEALTH CARE EDUCATION/TRAINING PROGRAM

## 2022-12-27 PROCEDURE — 86140 C-REACTIVE PROTEIN: CPT | Performed by: STUDENT IN AN ORGANIZED HEALTH CARE EDUCATION/TRAINING PROGRAM

## 2022-12-27 RX ORDER — LISINOPRIL 10 MG/1
10 TABLET ORAL DAILY
Qty: 90 TABLET | Refills: 1 | Status: SHIPPED | OUTPATIENT
Start: 2022-12-27

## 2022-12-27 RX ORDER — AMITRIPTYLINE HYDROCHLORIDE 10 MG/1
10 TABLET, FILM COATED ORAL NIGHTLY
Qty: 90 TABLET | Refills: 1 | Status: SHIPPED | OUTPATIENT
Start: 2022-12-27

## 2022-12-27 RX ORDER — PANTOPRAZOLE SODIUM 40 MG/1
40 TABLET, DELAYED RELEASE ORAL DAILY
Qty: 90 TABLET | Refills: 1 | Status: SHIPPED | OUTPATIENT
Start: 2022-12-27

## 2022-12-27 RX ORDER — HYDROCHLOROTHIAZIDE 25 MG/1
25 TABLET ORAL DAILY
Qty: 90 TABLET | Refills: 1 | Status: SHIPPED | OUTPATIENT
Start: 2022-12-27

## 2022-12-27 RX ORDER — LEVOTHYROXINE SODIUM 88 UG/1
88 TABLET ORAL DAILY
Qty: 90 TABLET | Refills: 1 | Status: SHIPPED | OUTPATIENT
Start: 2022-12-27 | End: 2023-02-10 | Stop reason: DRUGHIGH

## 2022-12-27 RX ORDER — ATORVASTATIN CALCIUM 20 MG/1
20 TABLET, FILM COATED ORAL NIGHTLY
Qty: 90 TABLET | Refills: 1 | Status: SHIPPED | OUTPATIENT
Start: 2022-12-27

## 2022-12-27 NOTE — PROGRESS NOTES
Chief Complaint  Follow-up (Both hands thumbs and first fingers really bad pain. ) and Medicare Wellness-subsequent    Subjective            Nancie Grissom presents to [facility]  History of Present Illness      Pt is from [place], states she sp[place] and has had chronic right shoulder pain for which she required casting of the right shoulder.     Pt w/ high tolerance for pain.     Endorses joint pain, started over the right thumb and index finger and is now  In the left  Thumb and index pain. Endorses swelling over the knuckes of the involved fingers. States pain is bad enough that she could cry.  States her pain is worse at the end of the day. Denies weakness of her hands. She has tried naproxen prn but does not help very well. She has also tried advil which helps. Has tried Volteran gel which is not helpful.     Medication refill:         Past Medical History:   Diagnosis Date   • Depression    • GERD (gastroesophageal reflux disease) 02/12/2015   • Hyperlipidemia    • Hypertension    • Hypothyroidism    • Osteoporosis    • Vitamin D deficiency        Allergies:   No Known Allergies       Past Surgical History:   Procedure Laterality Date   • ROTATOR CUFF REPAIR Right           Social History     Socioeconomic History   • Marital status:    Tobacco Use   • Smoking status: Never   • Smokeless tobacco: Never   Vaping Use   • Vaping Use: Never used   Substance and Sexual Activity   • Alcohol use: Yes     Comment: rarely   • Drug use: Never   • Sexual activity: Defer         History reviewed. No pertinent family history.       Health Maintenance Due   Topic Date Due   • TDAP/TD VACCINES (1 - Tdap) Never done   • ZOSTER VACCINE (1 of 2) Never done   • Pneumococcal Vaccine 65+ (2 - PCV) 08/12/2015   • DXA SCAN  03/28/2020   • COVID-19 Vaccine (2 - Booster for Sean series) 05/31/2021   • HEPATITIS C SCREENING  Never done   • ANNUAL WELLNESS  "VISIT  07/07/2021            Current Outpatient Medications:   •  amitriptyline (ELAVIL) 10 MG tablet, Take 1 tablet by mouth Every Night., Disp: 90 tablet, Rfl: 1  •  atorvastatin (LIPITOR) 20 MG tablet, Take 1 tablet by mouth Every Night., Disp: 90 tablet, Rfl: 1  •  Diclofenac Sodium (VOLTAREN) 1 % gel gel, Apply  topically to the appropriate area as directed 4 (Four) Times a Day., Disp: 150 g, Rfl: 3  •  hydroCHLOROthiazide (HYDRODIURIL) 25 MG tablet, Take 1 tablet by mouth Daily., Disp: 90 tablet, Rfl: 1  •  levothyroxine (Synthroid) 88 MCG tablet, Take 1 tablet by mouth Daily., Disp: 90 tablet, Rfl: 1  •  lisinopril (PRINIVIL,ZESTRIL) 10 MG tablet, Take 1 tablet by mouth Daily., Disp: 90 tablet, Rfl: 1  •  pantoprazole (Protonix) 40 MG EC tablet, Take 1 tablet by mouth Daily., Disp: 90 tablet, Rfl: 1  •  traZODone (DESYREL) 100 MG tablet, Take 1 tablet by mouth Daily., Disp: 90 tablet, Rfl: 1      Immunization History   Administered Date(s) Administered   • COVID-19 (CINTHIA) 04/05/2021   • Flu Vaccine Quad PF >36MO 02/09/2018   • Fluzone High-Dose 65+yrs 10/26/2022   • Influenza, Unspecified 10/21/2019   • Pneumococcal Polysaccharide (PPSV23) 08/12/2014         Review of Systems   Per hpi     Objective       Vitals:    12/27/22 1316   BP: 126/74   BP Location: Right arm   Patient Position: Sitting   Cuff Size: Adult   Pulse: 97   Temp: 97.8 °F (36.6 °C)   TempSrc: Temporal   SpO2: 98%   Weight: 70.9 kg (156 lb 4 oz)   Height: 156.2 cm (61.5\")     Body mass index is 29.05 kg/m².      Physical Exam  Vitals reviewed.   Constitutional:       Appearance: Normal appearance.   HENT:      Head: Normocephalic and atraumatic.      Nose: Nose normal.   Eyes:      Extraocular Movements: Extraocular movements intact.      Conjunctiva/sclera: Conjunctivae normal.   Pulmonary:      Effort: Pulmonary effort is normal. No respiratory distress.   Musculoskeletal:         General: Normal range of motion.      Comments: Swelling " over the right 2nd mcp.  Tender over mcp and pip of 1st and 2nd digits of bilateral fingers.   Mild ulnar deviation of her fingers on bilateral hands.    Skin:     General: Skin is warm and dry.   Neurological:      General: No focal deficit present.      Mental Status: She is alert and oriented to person, place, and time.      Cranial Nerves: No cranial nerve deficit.   Psychiatric:         Mood and Affect: Mood normal.         Behavior: Behavior normal.         Thought Content: Thought content normal.             Result Review :                           Assessment and Plan      Diagnoses and all orders for this visit:    1. Medicare annual wellness visit, subsequent (Primary)  Comments:  Pt is at baseline state of health. She is doing well. Acute needs addressed below.   Orders:  -     Ambulatory Referral to Social Care Services (Amb Case Mgmt)    2. Arthritis of both hands  Comments:  Chronic, active. Most likely due to osteoarthritis, screening for inflammatory arthrides. Recommend conservative management w/ prn nsaid/tylenol. Close f/u 6wks  Orders:  -     Comprehensive Metabolic Panel  -     CBC & Differential  -     Sedimentation Rate  -     C-reactive protein  -     ALVARO  -     Vitamin D 25 hydroxy    3. Hypothyroidism, unspecified type  Comments:  Chronic and stable. Due for labs. Meds refilled.   Orders:  -     CBC & Differential  -     TSH  -     T4, Free    4. Hyperlipidemia, unspecified hyperlipidemia type  Comments:  Chronic and stable. Due for labs. Meds refilled.   Orders:  -     Lipid Panel    5. ACP (advance care planning)  Comments:  need assistance w/ ACP. Referring to MSW.   Orders:  -     Ambulatory Referral to Social Care Services (Amb Case Mgmt)    6. Depression screening negative  Comments:  Repeat screen prn.     7. Vitamin D deficiency, unspecified  Comments:  Chronic and stable. Due for labs.   Orders:  -     Vitamin D 25 hydroxy    8. Breast cancer screening by mammogram  Comments:  Due  for routine screen. Last mammogram was in 2020.   Orders:  -     Mammo Screening Digital Tomosynthesis Bilateral With CAD; Future    9. Osteopenia, unspecified location  Comments:  Chronic, on vit D and calcium. Due for repeat Dexa (last was in 2018).   Orders:  -     DEXA Bone Density Axial    10. Other specified disorders of bone density and structure, other site  Comments:  Osteopenia over lumbar spine. See above.   Orders:  -     DEXA Bone Density Axial    11. Encounter for hepatitis C screening test for low risk patient  Comments:  Due for screening per current guidelines.   Orders:  -     Hepatitis C Antibody    12. Medication refill  Comments:  Refilled all meds.   Orders:  -     amitriptyline (ELAVIL) 10 MG tablet; Take 1 tablet by mouth Every Night.  Dispense: 90 tablet; Refill: 1  -     atorvastatin (LIPITOR) 20 MG tablet; Take 1 tablet by mouth Every Night.  Dispense: 90 tablet; Refill: 1  -     Diclofenac Sodium (VOLTAREN) 1 % gel gel; Apply  topically to the appropriate area as directed 4 (Four) Times a Day.  Dispense: 150 g; Refill: 3  -     hydroCHLOROthiazide (HYDRODIURIL) 25 MG tablet; Take 1 tablet by mouth Daily.  Dispense: 90 tablet; Refill: 1  -     levothyroxine (Synthroid) 88 MCG tablet; Take 1 tablet by mouth Daily.  Dispense: 90 tablet; Refill: 1  -     lisinopril (PRINIVIL,ZESTRIL) 10 MG tablet; Take 1 tablet by mouth Daily.  Dispense: 90 tablet; Refill: 1  -     pantoprazole (Protonix) 40 MG EC tablet; Take 1 tablet by mouth Daily.  Dispense: 90 tablet; Refill: 1    13. At low risk for fall            Follow Up     Return in about 4 weeks (around 1/24/2023), or arthritis, for bilateral hands. .    Patient was given instructions and counseling regarding her condition or for health maintenance advice. Please see specific information pulled into the AVS if appropriate.     Wen Leblanc MD   Internal Medicine-Pediatrics

## 2022-12-27 NOTE — PROGRESS NOTES
The ABCs of the Annual Wellness Visit  Subsequent Medicare Wellness Visit    Subjective      Nancie Grissom is a 77 y.o. female who presents for a Subsequent Medicare Wellness Visit.    The following portions of the patient's history were reviewed and   updated as appropriate: allergies, current medications, past family history, past medical history, past social history, past surgical history and problem list.    Compared to one year ago, the patient feels her physical   health is the same.    Compared to one year ago, the patient feels her mental   health is the same.    Recent Hospitalizations:  She was not admitted to the hospital during the last year.       Current Medical Providers:  Patient Care Team:  Wen Leblanc MD as PCP - General (Internal Medicine)    Outpatient Medications Prior to Visit   Medication Sig Dispense Refill   • amitriptyline (ELAVIL) 10 MG tablet TAKE 1 TABLET BY MOUTH EVERY NIGHT 90 tablet 0   • atorvastatin (LIPITOR) 20 MG tablet Take 1 tablet by mouth Every Night. 90 tablet 1   • Diclofenac Sodium (VOLTAREN) 1 % gel gel Apply  topically to the appropriate area as directed 4 (Four) Times a Day. 150 g 1   • hydroCHLOROthiazide (HYDRODIURIL) 25 MG tablet Take 1 tablet by mouth Daily. 90 tablet 1   • levothyroxine (Synthroid) 88 MCG tablet Take 1 tablet by mouth Daily. 90 tablet 1   • lisinopril (PRINIVIL,ZESTRIL) 10 MG tablet TAKE 1 TABLET BY MOUTH DAILY 90 tablet 1   • pantoprazole (Protonix) 40 MG EC tablet Take 1 tablet by mouth Daily. 90 tablet 1   • traZODone (DESYREL) 100 MG tablet Take 1 tablet by mouth Daily. 90 tablet 1   • lisinopril (PRINIVIL,ZESTRIL) 10 MG tablet Take 1 tablet by mouth Daily. 90 tablet 0     No facility-administered medications prior to visit.       No opioid medication identified on active medication list. I have reviewed chart for other potential  high risk medication/s and harmful drug interactions in the elderly.          Aspirin is not on active  "medication list.  Aspirin use is not indicated based on review of current medical condition/s. Risk of harm outweighs potential benefits.  .    Patient Active Problem List   Diagnosis   • Depression   • Esophageal reflux   • Hyperlipidemia   • Hypertension   • Hypothyroidism   • Osteoporosis   • Vitamin D deficiency   • Annual physical exam     Advance Care Planning  Advance Directive is not on file.  ACP discussion was held with the patient during this visit. Patient does not have an advance directive, information provided.     Objective    Vitals:    12/27/22 1316   BP: 126/74   BP Location: Right arm   Patient Position: Sitting   Cuff Size: Adult   Pulse: 97   Temp: 97.8 °F (36.6 °C)   TempSrc: Temporal   SpO2: 98%   Weight: 70.9 kg (156 lb 4 oz)   Height: 156.2 cm (61.5\")     Estimated body mass index is 29.05 kg/m² as calculated from the following:    Height as of this encounter: 156.2 cm (61.5\").    Weight as of this encounter: 70.9 kg (156 lb 4 oz).    BMI is >= 25 and <30. (Overweight) The following options were offered after discussion;: exercise counseling/recommendations      Does the patient have evidence of cognitive impairment?   No            HEALTH RISK ASSESSMENT    Smoking Status:  Social History     Tobacco Use   Smoking Status Never   Smokeless Tobacco Never     Alcohol Consumption:  Social History     Substance and Sexual Activity   Alcohol Use Yes    Comment: rarely     Fall Risk Screen:    LYNNE Fall Risk Assessment was completed, and patient is at LOW risk for falls.Assessment completed on:12/27/2022    Depression Screening:  PHQ-2/PHQ-9 Depression Screening 12/27/2022   Little Interest or Pleasure in Doing Things 0-->not at all   Feeling Down, Depressed or Hopeless 1-->several days   PHQ-9: Brief Depression Severity Measure Score 1       Health Habits and Functional and Cognitive Screening:  Functional & Cognitive Status 12/27/2022   Do you have difficulty preparing food and eating? No   Do " you have difficulty bathing yourself, getting dressed or grooming yourself? No   Do you have difficulty using the toilet? No   Do you have difficulty moving around from place to place? No   Do you have trouble with steps or getting out of a bed or a chair? No   Current Diet Well Balanced Diet   Dental Exam Not up to date   Eye Exam Not up to date   Exercise (times per week) 1 times per week   Current Exercises Include Walking   Do you need help using the phone?  No   Are you deaf or do you have serious difficulty hearing?  No   Do you need help with transportation? No   Do you need help shopping? No   Do you need help preparing meals?  No   Do you need help with housework?  No   Do you need help with laundry? No   Do you need help taking your medications? No   Do you need help managing money? No   Do you ever drive or ride in a car without wearing a seat belt? No   Have you felt unusual stress, anger or loneliness in the last month? No   Who do you live with? Alone   If you need help, do you have trouble finding someone available to you? No   Have you been bothered in the last four weeks by sexual problems? No   Do you have difficulty concentrating, remembering or making decisions? No       Age-appropriate Screening Schedule:  Refer to the list below for future screening recommendations based on patient's age, sex and/or medical conditions. Orders for these recommended tests are listed in the plan section. The patient has been provided with a written plan.    Health Maintenance   Topic Date Due   • TDAP/TD VACCINES (1 - Tdap) Never done   • ZOSTER VACCINE (1 of 2) Never done   • DXA SCAN  03/28/2020   • LIPID PANEL  07/18/2023   • INFLUENZA VACCINE  Completed                CMS Preventative Services Quick Reference  Risk Factors Identified During Encounter:    None Identified    The above risks/problems have been discussed with the patient.  Pertinent information has been shared with the patient in the After Visit  Summary.    Diagnoses and all orders for this visit:    1. Medicare annual wellness visit, subsequent (Primary)  Comments:  Pt is at baseline state of health. She is doing well. Acute needs addressed below.   Orders:  -     Ambulatory Referral to Social Care Services (Amb Case Mgmt)    2. ACP (advance care planning)  Comments:  need assistance w/ ACP. Referring to MSW.   Orders:  -     Ambulatory Referral to Social Care Services (Amb Case Mgmt)    3. Depression screening negative  Comments:  Repeat screen prn.     4. At low risk for fall        Follow Up:   Next Medicare Wellness visit to be scheduled in 1 year.      An After Visit Summary and PPPS were made available to the patient.

## 2022-12-28 ENCOUNTER — TELEPHONE (OUTPATIENT)
Dept: INTERNAL MEDICINE | Facility: CLINIC | Age: 77
End: 2022-12-28

## 2022-12-28 ENCOUNTER — REFERRAL TRIAGE (OUTPATIENT)
Dept: CASE MANAGEMENT | Facility: OTHER | Age: 77
End: 2022-12-28

## 2022-12-28 DIAGNOSIS — E03.9 HYPOTHYROIDISM, UNSPECIFIED TYPE: Primary | ICD-10-CM

## 2022-12-28 LAB
DSDNA IGG SERPL IA-ACNC: NEGATIVE [IU]/ML
NUCLEAR IGG SER IA-RTO: NEGATIVE

## 2022-12-28 NOTE — TELEPHONE ENCOUNTER
Caller: Nancie Grissom    Relationship: Self    Best call back number: [phone]     What is the best time to reach you: ANY    Who are you requesting to speak with (clinical staff, provider,  specific staff member): CLINICAL      What was the call regarding: PATIENT REPORTS SHE WAS PRESCRIBED amitriptyline (ELAVIL) 10 MG tablet; AND WANTS TO MAKE SURE SHE SHOULD BE TAKING HER traZODone (DESYREL) 100 MG tablet AS WELL    PLEASE ADVISE    Do you require a callback: YES

## 2022-12-28 NOTE — TELEPHONE ENCOUNTER
Returned pt call.     On Elvavil which works best for her.   Recommend against taking both of these medications at the same time.   Updated her medication list.

## 2022-12-30 ENCOUNTER — PATIENT OUTREACH (OUTPATIENT)
Dept: CASE MANAGEMENT | Facility: OTHER | Age: 77
End: 2022-12-30

## 2022-12-30 NOTE — OUTREACH NOTE
Social Work Assessment  Questions/Answers    Flowsheet Row Most Recent Value   Referral Source physician   Reason for Consult health care directive   Advance Care Planning Reviewed questions answered   Decision Making Considerations patient/family ability to make health care decisions   Assistance with Healthcare Needs power of  for finances, power of  for healthcare   People in Home alone   Current Living Arrangements home   Primary Care Provided by self   Provides Primary Care For no one, unable/limited ability to care for self   Family Caregiver if Needed child(adilia), adult   Quality of Family Relationships helpful, supportive   Source of Income social security   Application for Public Assistance pending public assistance pending number        SDOH updated and reviewed with the patient during this program:  Financial Resource Strain: Low Risk    • Difficulty of Paying Living Expenses: Not hard at all      Food Insecurity: No Food Insecurity   • Worried About Running Out of Food in the Last Year: Never true   • Ran Out of Food in the Last Year: Never true      Transportation Needs: No Transportation Needs   • Lack of Transportation (Medical): No   • Lack of Transportation (Non-Medical): No      Housing Stability: Low Risk    • Unable to Pay for Housing in the Last Year: No   • Number of Places Lived in the Last Year: 1   • Unstable Housing in the Last Year: No     Patient Outreach    MSW Spoke with patient to discuss ACP. Patient states that she has company at her home currently and will not fill these forms out until after the new year. Patient requested that these forms be mailed to her. MSW sent to Audrey at front office to mail. MSW to discharge as this was the only present need.    YAHAIRA LEON -   Ambulatory Case Management    12/30/2022, 13:28 EST

## 2023-01-24 ENCOUNTER — OFFICE VISIT (OUTPATIENT)
Dept: INTERNAL MEDICINE | Facility: CLINIC | Age: 78
End: 2023-01-24
Payer: MEDICARE

## 2023-01-24 VITALS
RESPIRATION RATE: 18 BRPM | SYSTOLIC BLOOD PRESSURE: 132 MMHG | TEMPERATURE: 96.4 F | OXYGEN SATURATION: 98 % | BODY MASS INDEX: 29.59 KG/M2 | DIASTOLIC BLOOD PRESSURE: 82 MMHG | WEIGHT: 159.2 LBS | HEART RATE: 99 BPM

## 2023-01-24 DIAGNOSIS — E03.9 HYPOTHYROIDISM, UNSPECIFIED TYPE: ICD-10-CM

## 2023-01-24 DIAGNOSIS — M19.90 ARTHRITIS: Primary | ICD-10-CM

## 2023-01-24 LAB — T4 FREE SERPL-MCNC: 1.88 NG/DL (ref 0.93–1.7)

## 2023-01-24 PROCEDURE — 84439 ASSAY OF FREE THYROXINE: CPT | Performed by: STUDENT IN AN ORGANIZED HEALTH CARE EDUCATION/TRAINING PROGRAM

## 2023-01-24 PROCEDURE — 99213 OFFICE O/P EST LOW 20 MIN: CPT | Performed by: STUDENT IN AN ORGANIZED HEALTH CARE EDUCATION/TRAINING PROGRAM

## 2023-01-24 PROCEDURE — 96372 THER/PROPH/DIAG INJ SC/IM: CPT | Performed by: STUDENT IN AN ORGANIZED HEALTH CARE EDUCATION/TRAINING PROGRAM

## 2023-01-24 RX ORDER — KETOROLAC TROMETHAMINE 30 MG/ML
60 INJECTION, SOLUTION INTRAMUSCULAR; INTRAVENOUS ONCE
Status: COMPLETED | OUTPATIENT
Start: 2023-01-24 | End: 2023-01-24

## 2023-01-24 RX ADMIN — KETOROLAC TROMETHAMINE 60 MG: 30 INJECTION, SOLUTION INTRAMUSCULAR; INTRAVENOUS at 16:44

## 2023-01-24 NOTE — PROGRESS NOTES
"Chief Complaint  Arthritis (4 week follow up/Arthritis is getting worse and very painful, said it feels like it did when she had shingles. Worse in left hand.)    Subjective            Nancie Grissom presents to [facility]  History of Present Illness    Arthritis:   States it went from her right hand to the left hand with discoloration over the MCP, violacious and painful. States the MCP also feel hot.   Endorses pain which is difficult to explain, \"hurts more at night, pain is not always the same, feels like something crawling in her hands, states pain is worse at night and first thing in the morning.\"  She has been using naproxen and advil which helps  for a short time.   She last did physical therapy several years ago but does continue to exercise her hands and fingers as much as she can at home.     Past Medical History:   Diagnosis Date   • Depression    • GERD (gastroesophageal reflux disease) 02/12/2015   • Hyperlipidemia    • Hypertension    • Hypothyroidism    • Osteoporosis    • Vitamin D deficiency        Allergies:   No Known Allergies       Past Surgical History:   Procedure Laterality Date   • ROTATOR CUFF REPAIR Right           Social History     Socioeconomic History   • Marital status:    Tobacco Use   • Smoking status: Never   • Smokeless tobacco: Never   Vaping Use   • Vaping Use: Never used   Substance and Sexual Activity   • Alcohol use: Yes     Comment: rarely   • Drug use: Never   • Sexual activity: Defer         History reviewed. No pertinent family history.       Health Maintenance Due   Topic Date Due   • TDAP/TD VACCINES (1 - Tdap) Never done   • ZOSTER VACCINE (1 of 2) Never done   • Pneumococcal Vaccine 65+ (2 - PCV) 08/12/2015   • DXA SCAN  03/28/2020   • COVID-19 Vaccine (2 - Booster for Sean series) 05/31/2021            Current Outpatient Medications:   •  amitriptyline (ELAVIL) 10 MG tablet, Take 1 tablet by mouth Every Night., " Disp: 90 tablet, Rfl: 1  •  Diclofenac Sodium (VOLTAREN) 1 % gel gel, Apply  topically to the appropriate area as directed 4 (Four) Times a Day., Disp: 150 g, Rfl: 3  •  hydroCHLOROthiazide (HYDRODIURIL) 25 MG tablet, Take 1 tablet by mouth Daily., Disp: 90 tablet, Rfl: 1  •  levothyroxine (Synthroid) 88 MCG tablet, Take 1 tablet by mouth Daily., Disp: 90 tablet, Rfl: 1  •  pantoprazole (Protonix) 40 MG EC tablet, Take 1 tablet by mouth Daily., Disp: 90 tablet, Rfl: 1  •  atorvastatin (LIPITOR) 20 MG tablet, Take 1 tablet by mouth Every Night., Disp: 90 tablet, Rfl: 1  •  lisinopril (PRINIVIL,ZESTRIL) 10 MG tablet, Take 1 tablet by mouth Daily., Disp: 90 tablet, Rfl: 1  No current facility-administered medications for this visit.      Immunization History   Administered Date(s) Administered   • COVID-19 (CINTHIA) 04/05/2021   • Flu Vaccine Quad PF >36MO 02/09/2018   • Fluzone High-Dose 65+yrs 10/26/2022   • Influenza, Unspecified 10/21/2019   • Pneumococcal Polysaccharide (PPSV23) 08/12/2014       Review of Systems   Per hpi     Objective       Vitals:    01/24/23 1343   BP: 132/82   BP Location: Right arm   Patient Position: Sitting   Cuff Size: Adult   Pulse: 99   Resp: 18   Temp: 96.4 °F (35.8 °C)   SpO2: 98%   Weight: 72.2 kg (159 lb 3.2 oz)     Body mass index is 29.59 kg/m².      Physical Exam  Vitals reviewed.   Constitutional:       Appearance: Normal appearance.   HENT:      Head: Normocephalic and atraumatic.      Nose: Nose normal.   Eyes:      Extraocular Movements: Extraocular movements intact.      Conjunctiva/sclera: Conjunctivae normal.   Pulmonary:      Effort: Pulmonary effort is normal. No respiratory distress.   Musculoskeletal:         General: Tenderness (over her right 1st and 2nd MCP, left 1st MCP, 4th mcp w/ some swelling and light discoloration of the involved joints.  ) present.      Comments: ROM of hands are limited as pt is unable to make fully make a fists w/ both hands. Bilateral hand  weakness w/ weak  noted.     Mild tenderness over 3rd and 4th PIP.    Skin:     General: Skin is warm and dry.   Neurological:      General: No focal deficit present.      Mental Status: She is alert and oriented to person, place, and time.      Cranial Nerves: No cranial nerve deficit.   Psychiatric:         Mood and Affect: Mood normal.         Behavior: Behavior normal.         Thought Content: Thought content normal.             Result Review :                           Assessment and Plan      Diagnoses and all orders for this visit:    1. Arthritis (Primary)  Comments:  Chronic, w/ worsening pain and weakness. Most likely due to ostearthritis. xray of bilateral hands ordered. Previous laborartory w/u w/ ALVARO, RF factor, ESR, CRP was negative. CCP ordered. Toradol injection in office. Close f/u in 2 wks.   Orders:  -     XR Hand 3+ View Right  -     XR Hand 3+ View Left  -     Cyclic Citrul Peptide Antibody, IgG / IgA  -     ketorolac (TORADOL) injection 60 mg    2. Hypothyroidism, unspecified type  Comments:  Chronic and typically well controlled, however abnormal labs recently. PT was taking synthroid incorrectly at the time. Repeat labs ordered.   Orders:  -     T4, Free          Follow Up     Return in about 2 weeks (around 2/7/2023) for arthritis .    Patient was given instructions and counseling regarding her condition or for health maintenance advice. Please see specific information pulled into the AVS if appropriate.     Wen Leblanc MD   Internal Medicine-Pediatrics

## 2023-02-07 ENCOUNTER — OFFICE VISIT (OUTPATIENT)
Dept: INTERNAL MEDICINE | Facility: CLINIC | Age: 78
End: 2023-02-07
Payer: MEDICARE

## 2023-02-07 VITALS
OXYGEN SATURATION: 99 % | WEIGHT: 157 LBS | SYSTOLIC BLOOD PRESSURE: 112 MMHG | RESPIRATION RATE: 18 BRPM | BODY MASS INDEX: 29.64 KG/M2 | TEMPERATURE: 97.7 F | HEART RATE: 90 BPM | HEIGHT: 61 IN | DIASTOLIC BLOOD PRESSURE: 82 MMHG

## 2023-02-07 DIAGNOSIS — M19.90 ARTHRITIS: Primary | ICD-10-CM

## 2023-02-07 DIAGNOSIS — E03.9 HYPOTHYROIDISM, UNSPECIFIED TYPE: ICD-10-CM

## 2023-02-07 PROCEDURE — 86160 COMPLEMENT ANTIGEN: CPT | Performed by: STUDENT IN AN ORGANIZED HEALTH CARE EDUCATION/TRAINING PROGRAM

## 2023-02-07 PROCEDURE — 86235 NUCLEAR ANTIGEN ANTIBODY: CPT | Performed by: STUDENT IN AN ORGANIZED HEALTH CARE EDUCATION/TRAINING PROGRAM

## 2023-02-07 PROCEDURE — 99214 OFFICE O/P EST MOD 30 MIN: CPT | Performed by: STUDENT IN AN ORGANIZED HEALTH CARE EDUCATION/TRAINING PROGRAM

## 2023-02-07 PROCEDURE — 36415 COLL VENOUS BLD VENIPUNCTURE: CPT | Performed by: STUDENT IN AN ORGANIZED HEALTH CARE EDUCATION/TRAINING PROGRAM

## 2023-02-07 PROCEDURE — 84443 ASSAY THYROID STIM HORMONE: CPT | Performed by: STUDENT IN AN ORGANIZED HEALTH CARE EDUCATION/TRAINING PROGRAM

## 2023-02-07 PROCEDURE — 86200 CCP ANTIBODY: CPT | Performed by: STUDENT IN AN ORGANIZED HEALTH CARE EDUCATION/TRAINING PROGRAM

## 2023-02-07 PROCEDURE — 86225 DNA ANTIBODY NATIVE: CPT | Performed by: STUDENT IN AN ORGANIZED HEALTH CARE EDUCATION/TRAINING PROGRAM

## 2023-02-07 PROCEDURE — 86431 RHEUMATOID FACTOR QUANT: CPT | Performed by: STUDENT IN AN ORGANIZED HEALTH CARE EDUCATION/TRAINING PROGRAM

## 2023-02-07 RX ORDER — NAPROXEN SODIUM 220 MG
220 TABLET ORAL 2 TIMES DAILY PRN
Qty: 60 TABLET | Refills: 3 | Status: SHIPPED | OUTPATIENT
Start: 2023-02-07

## 2023-02-07 RX ORDER — SENNOSIDES 8.6 MG
650 CAPSULE ORAL 2 TIMES DAILY PRN
Qty: 60 TABLET | Refills: 2 | Status: SHIPPED | OUTPATIENT
Start: 2023-02-07 | End: 2023-03-10

## 2023-02-07 NOTE — PROGRESS NOTES
Chief Complaint  Arthritis (2 week follow up, still hurting as bad as they were last time she was here. Seems to be moving from one finger to the next.)    Subjective            Nancie Grissom presents to [facility]  History of Present Illness    Arthritis:   Chronic, over bilateral hands.   States her symptoms are unchanged from last visit.   States her family member have told her that she should be prescribed arthritis pills, but no name is provided.   She is currently on naproxen which she states is not working.   States she was given 2 pills, which she thinks is     Today patient shares that she was told that she had lupus about 30 years ago by a prior pcp, states that she was referred to rheumatologist but never went since her symptoms got better then.           Past Medical History:   Diagnosis Date   • Depression    • GERD (gastroesophageal reflux disease) 02/12/2015   • Hyperlipidemia    • Hypertension    • Hypothyroidism    • Osteoporosis    • Vitamin D deficiency        Allergies:   No Known Allergies       Past Surgical History:   Procedure Laterality Date   • ROTATOR CUFF REPAIR Right           Social History     Socioeconomic History   • Marital status:    Tobacco Use   • Smoking status: Never   • Smokeless tobacco: Never   Vaping Use   • Vaping Use: Never used   Substance and Sexual Activity   • Alcohol use: Yes     Comment: rarely   • Drug use: Never   • Sexual activity: Defer         History reviewed. No pertinent family history.       Health Maintenance Due   Topic Date Due   • TDAP/TD VACCINES (1 - Tdap) Never done   • ZOSTER VACCINE (1 of 2) Never done   • Pneumococcal Vaccine 65+ (2 - PCV) 08/12/2015   • DXA SCAN  03/28/2020   • COVID-19 Vaccine (2 - Booster for Sean series) 05/31/2021            Current Outpatient Medications:   •  amitriptyline (ELAVIL) 10 MG tablet, Take 1 tablet by mouth Every Night., Disp: 90 tablet, Rfl: 1  •   "atorvastatin (LIPITOR) 20 MG tablet, Take 1 tablet by mouth Every Night., Disp: 90 tablet, Rfl: 1  •  Diclofenac Sodium (VOLTAREN) 1 % gel gel, Apply  topically to the appropriate area as directed 4 (Four) Times a Day., Disp: 150 g, Rfl: 3  •  hydroCHLOROthiazide (HYDRODIURIL) 25 MG tablet, Take 1 tablet by mouth Daily., Disp: 90 tablet, Rfl: 1  •  levothyroxine (Synthroid) 88 MCG tablet, Take 1 tablet by mouth Daily., Disp: 90 tablet, Rfl: 1  •  lisinopril (PRINIVIL,ZESTRIL) 10 MG tablet, Take 1 tablet by mouth Daily., Disp: 90 tablet, Rfl: 1  •  pantoprazole (Protonix) 40 MG EC tablet, Take 1 tablet by mouth Daily., Disp: 90 tablet, Rfl: 1  •  acetaminophen (Tylenol 8 Hour Arthritis Pain) 650 MG 8 hr tablet, Take 1 tablet by mouth 2 (Two) Times a Day As Needed for Mild Pain for up to 30 doses., Disp: 60 tablet, Rfl: 2  •  naproxen sodium (Aleve) 220 MG tablet, Take 1 tablet by mouth 2 (Two) Times a Day As Needed for Mild Pain for up to 60 doses., Disp: 60 tablet, Rfl: 3      Immunization History   Administered Date(s) Administered   • COVID-19 (CINTHIA) 04/05/2021   • Flu Vaccine Quad PF >36MO 02/09/2018   • Fluzone High-Dose 65+yrs 10/26/2022   • Influenza, Unspecified 10/21/2019   • Pneumococcal Polysaccharide (PPSV23) 08/12/2014         Review of Systems       Objective       Vitals:    02/07/23 1153   BP: 112/82   BP Location: Right arm   Patient Position: Sitting   Cuff Size: Adult   Pulse: 90   Resp: 18   Temp: 97.7 °F (36.5 °C)   SpO2: 99%   Weight: 71.2 kg (157 lb)   Height: 156.2 cm (61.5\")     Body mass index is 29.19 kg/m².      Physical Exam  Vitals reviewed.   Constitutional:       Appearance: Normal appearance.   HENT:      Head: Normocephalic and atraumatic.      Nose: Nose normal.   Eyes:      Extraocular Movements: Extraocular movements intact.      Conjunctiva/sclera: Conjunctivae normal.   Pulmonary:      Effort: Pulmonary effort is normal. No respiratory distress.   Musculoskeletal:         " General: Swelling (swelling over right 1st and 2nd MCP, left 1st mcp and 4th mcp) present. Tenderness:         Comments: ROM of hands are limited as pt is unable to make fully make a fists w/ both hands.    tenderness over 3rd and 4th PIP.    Skin:     General: Skin is warm and dry.   Neurological:      General: No focal deficit present.      Mental Status: She is alert and oriented to person, place, and time.      Cranial Nerves: No cranial nerve deficit.   Psychiatric:         Mood and Affect: Mood normal.         Behavior: Behavior normal.         Thought Content: Thought content normal.             Result Review :                           Assessment and Plan      Diagnoses and all orders for this visit:    1. Arthritis (Primary)  Comments:  Chronic, active. Most likely due to osteoarthritis.  Pt again today reports significant discomfort and pain from her arthritis however declined all offered options for further evaluation and/or treatment.   Discussed referral to hand specialist, referral to occupationonal/physical therapy, pain specialist for consideration for injections which she declines.     Today, for the first time since we have been evaluating her hand pain, she reports prior hx of ?lupus (see hpi). Discussed low suspicion for lupus. Negative ALVARO in past.  Reviewed ACR classification criteria for SLE, she currently does not meet the diagnostic criteria although some additional labs are needed to complete the laboratory evaluation for lupus. Discussed low to no likelihood for SLE as the etiology for her symptoms.               She is currently using NSAID but at very low dose. Recommend increasing naproxen. See pt instructions below.  Pt also report significant improvement w/ tylenol as well. Prescription sent in.     Orders:  -     acetaminophen (Tylenol 8 Hour Arthritis Pain) 650 MG 8 hr tablet; Take 1 tablet by mouth 2 (Two) Times a Day As Needed for Mild Pain for up to 30 doses.  Dispense: 60  tablet; Refill: 2  -     C4+C3  -     Anti-Smith Antibody  -     Anti-DNA Antibody, Double-stranded    2. Hypothyroidism, unspecified type  Comments:  Chronic, thyroid labs abnormal at last check and need repeating.   Orders:  -     TSH    Other orders  -     naproxen sodium (Aleve) 220 MG tablet; Take 1 tablet by mouth 2 (Two) Times a Day As Needed for Mild Pain for up to 60 doses.  Dispense: 60 tablet; Refill: 3                  Follow Up     No follow-ups on file.    Patient was given instructions and counseling regarding her condition or for health maintenance advice. Please see specific information pulled into the AVS if appropriate.     Wen Leblanc MD   Internal Medicine-Pediatrics

## 2023-02-08 LAB
C3 SERPL-MCNC: 168 MG/DL (ref 82–167)
C4 SERPL-MCNC: 40 MG/DL (ref 14–44)
TSH SERPL DL<=0.05 MIU/L-ACNC: 0.04 UIU/ML (ref 0.27–4.2)

## 2023-02-09 LAB
CCP IGA+IGG SERPL IA-ACNC: >250 UNITS (ref 0–19)
DSDNA AB SER-ACNC: 1 IU/ML (ref 0–9)
ENA SM AB SER-ACNC: <0.2 AI (ref 0–0.9)

## 2023-02-10 DIAGNOSIS — M19.041 ARTHRITIS OF BOTH HANDS: Primary | ICD-10-CM

## 2023-02-10 DIAGNOSIS — R79.89 ABNORMAL THYROID BLOOD TEST: ICD-10-CM

## 2023-02-10 DIAGNOSIS — M19.042 ARTHRITIS OF BOTH HANDS: Primary | ICD-10-CM

## 2023-02-10 DIAGNOSIS — R79.89 HIGH CYCLIC CITRULLINATED PEPTIDE (CCP) ANTIBODY LEVEL: ICD-10-CM

## 2023-02-10 DIAGNOSIS — R94.6 ABNORMAL RESULTS OF THYROID FUNCTION STUDIES: ICD-10-CM

## 2023-02-10 DIAGNOSIS — E03.9 HYPOTHYROIDISM, UNSPECIFIED TYPE: ICD-10-CM

## 2023-02-10 LAB — CHROMATIN AB SERPL-ACNC: 30.4 IU/ML (ref 0–14)

## 2023-02-10 RX ORDER — LEVOTHYROXINE SODIUM 0.07 MG/1
75 TABLET ORAL DAILY
Qty: 60 TABLET | Refills: 1 | Status: SHIPPED | OUTPATIENT
Start: 2023-02-10 | End: 2023-04-11

## 2023-02-21 ENCOUNTER — TELEPHONE (OUTPATIENT)
Dept: INTERNAL MEDICINE | Facility: CLINIC | Age: 78
End: 2023-02-21
Payer: MEDICARE

## 2023-02-21 NOTE — TELEPHONE ENCOUNTER
Caller: Nancie Grissom    Relationship: Self    Best call back number: [phone]    What is the best time to reach you: ANYTI    Who are you requesting to speak with (clinical staff, provider,  specific staff member):DR. KENNETH SANDERS         What was the call regarding: PATIENT IS REQUESTING THAT DR. KENNETH SANDERS GIVER HER A CALL BACK     Do you require a callback: YES

## 2023-02-21 NOTE — TELEPHONE ENCOUNTER
Patient has been referred to Rheumatology Dr Retana. They cant get her in until June she is in so much pain she is having trouble dressing herself. What else can we do to help her.

## 2023-02-22 NOTE — TELEPHONE ENCOUNTER
Andi Rodriguez MA         10:22 AM  Note   Patient has been referred to Rheumatology Dr Retana. They cant get her in until June she is in so much pain she is having trouble dressing herself. What else can we do to help her.

## 2023-02-24 NOTE — TELEPHONE ENCOUNTER
Called daughter back same day (2/21). Reviewed all treatment options offered to patient during our last 2 visit which she refused. Informed daughter that if she has access to [facility] (pt portal) she is able to see the documentation for each patient visit. Offered earlier appointment for repeat evaluation and review of other treatment modalities for 3/10 and daughter reports she will make sure that a family member attends with her.

## 2023-03-10 ENCOUNTER — OFFICE VISIT (OUTPATIENT)
Dept: INTERNAL MEDICINE | Facility: CLINIC | Age: 78
End: 2023-03-10
Payer: MEDICARE

## 2023-03-10 VITALS
OXYGEN SATURATION: 98 % | TEMPERATURE: 97 F | HEIGHT: 61 IN | HEART RATE: 90 BPM | DIASTOLIC BLOOD PRESSURE: 84 MMHG | BODY MASS INDEX: 29.42 KG/M2 | SYSTOLIC BLOOD PRESSURE: 118 MMHG | WEIGHT: 155.8 LBS

## 2023-03-10 DIAGNOSIS — M19.041 ARTHRITIS OF BOTH HANDS: ICD-10-CM

## 2023-03-10 DIAGNOSIS — M19.042 ARTHRITIS OF BOTH HANDS: ICD-10-CM

## 2023-03-10 DIAGNOSIS — M19.90 ARTHRITIS: Primary | ICD-10-CM

## 2023-03-10 DIAGNOSIS — R79.89 HIGH CYCLIC CITRULLINATED PEPTIDE (CCP) ANTIBODY LEVEL: ICD-10-CM

## 2023-03-10 PROCEDURE — 96372 THER/PROPH/DIAG INJ SC/IM: CPT | Performed by: STUDENT IN AN ORGANIZED HEALTH CARE EDUCATION/TRAINING PROGRAM

## 2023-03-10 PROCEDURE — 99213 OFFICE O/P EST LOW 20 MIN: CPT | Performed by: STUDENT IN AN ORGANIZED HEALTH CARE EDUCATION/TRAINING PROGRAM

## 2023-03-10 RX ORDER — KETOROLAC TROMETHAMINE 30 MG/ML
60 INJECTION, SOLUTION INTRAMUSCULAR; INTRAVENOUS ONCE
Status: COMPLETED | OUTPATIENT
Start: 2023-03-10 | End: 2023-03-10

## 2023-03-10 RX ORDER — KETOROLAC TROMETHAMINE 10 MG/1
10 TABLET, FILM COATED ORAL EVERY 6 HOURS PRN
Qty: 30 TABLET | Refills: 0 | Status: SHIPPED | OUTPATIENT
Start: 2023-03-10 | End: 2023-04-09

## 2023-03-10 RX ADMIN — KETOROLAC TROMETHAMINE 60 MG: 30 INJECTION, SOLUTION INTRAMUSCULAR; INTRAVENOUS at 12:47

## 2023-03-10 NOTE — PATIENT INSTRUCTIONS
Arthritis:  Chronic.   Toradol pill sent in to use as needed. Please do not take this with the naproxen.     We've also sent a referral to physical therapy.

## 2023-03-10 NOTE — PROGRESS NOTES
Chief Complaint  Hand Pain (Follow up,/Got a shot and was told there was a pill she can take when the shot wore off and didn't get  a prescription for it.)    Subjective            Nancie Grissom presents to [facility]  History of Present Illness  Answers for HPI/ROS submitted by the patient on 3/8/2023  Please describe your symptoms.: Pain in fingers and knee.   Blades tired all the time.  Never hungry.  Have you had these symptoms before?: Yes  How long have you been having these symptoms?: Greater than 2 weeks  Please list any medications you are currently taking for this condition.: Synthroid , Hydrochlorothiazide  Please describe any probable cause for these symptoms. : Arthritis  What is the primary reason for your visit?: Other    Arthritis:  Chronic arthritis of bilateral hands which is persisting.   States Naproxen helps sometimes.   Re-iterates that the toradol injection administered in Jan (1/24)resulted in significant improvement.   Presenting with son today due to concern for miscommunication about offered treatment plans.     Patient has been in seen in our office a few times for the hand pain. We've discussed treatment options in past which she's been reluctant to proceed with but unfortunately she would return home and share that nothing was being done about her pain.           Past Medical History:   Diagnosis Date   • Depression    • GERD (gastroesophageal reflux disease) 02/12/2015   • Hyperlipidemia    • Hypertension    • Hypothyroidism    • Osteoporosis    • Vitamin D deficiency        Allergies:   No Known Allergies       Past Surgical History:   Procedure Laterality Date   • ROTATOR CUFF REPAIR Right           Social History     Socioeconomic History   • Marital status:    Tobacco Use   • Smoking status: Never   • Smokeless tobacco: Never   Vaping Use   • Vaping Use: Never used   Substance and Sexual Activity   • Alcohol use: Yes      Comment: rarely   • Drug use: Never   • Sexual activity: Defer         History reviewed. No pertinent family history.       Health Maintenance Due   Topic Date Due   • TDAP/TD VACCINES (1 - Tdap) Never done   • ZOSTER VACCINE (1 of 2) Never done   • Pneumococcal Vaccine 65+ (2 - PCV) 08/12/2015   • DXA SCAN  03/28/2020   • COVID-19 Vaccine (2 - Booster for Cinthia series) 05/31/2021            Current Outpatient Medications:   •  amitriptyline (ELAVIL) 10 MG tablet, Take 1 tablet by mouth Every Night., Disp: 90 tablet, Rfl: 1  •  atorvastatin (LIPITOR) 20 MG tablet, Take 1 tablet by mouth Every Night., Disp: 90 tablet, Rfl: 1  •  Diclofenac Sodium (VOLTAREN) 1 % gel gel, Apply  topically to the appropriate area as directed 4 (Four) Times a Day., Disp: 150 g, Rfl: 3  •  hydroCHLOROthiazide (HYDRODIURIL) 25 MG tablet, Take 1 tablet by mouth Daily., Disp: 90 tablet, Rfl: 1  •  levothyroxine (Synthroid) 75 MCG tablet, Take 1 tablet by mouth Daily for 60 days., Disp: 60 tablet, Rfl: 1  •  lisinopril (PRINIVIL,ZESTRIL) 10 MG tablet, Take 1 tablet by mouth Daily., Disp: 90 tablet, Rfl: 1  •  naproxen sodium (Aleve) 220 MG tablet, Take 1 tablet by mouth 2 (Two) Times a Day As Needed for Mild Pain for up to 60 doses., Disp: 60 tablet, Rfl: 3  •  pantoprazole (Protonix) 40 MG EC tablet, Take 1 tablet by mouth Daily., Disp: 90 tablet, Rfl: 1  •  ketorolac (TORADOL) 10 MG tablet, Take 1 tablet by mouth Every 6 (Six) Hours As Needed for Moderate Pain for up to 30 days., Disp: 30 tablet, Rfl: 0  No current facility-administered medications for this visit.      Immunization History   Administered Date(s) Administered   • COVID-19 (CINTHIA) 04/05/2021   • Flu Vaccine Quad PF >36MO 02/09/2018   • Fluzone High-Dose 65+yrs 10/26/2022   • Influenza, Unspecified 10/21/2019   • Pneumococcal Polysaccharide (PPSV23) 08/12/2014         Review of Systems       Objective       Vitals:    03/10/23 1146   BP: 118/84   BP Location: Right arm  "  Patient Position: Sitting   Cuff Size: Adult   Pulse: 90   Temp: 97 °F (36.1 °C)   SpO2: 98%   Weight: 70.7 kg (155 lb 12.8 oz)   Height: 156.2 cm (61.5\")     Body mass index is 28.97 kg/m².      Physical Exam  Vitals reviewed.   Constitutional:       Appearance: Normal appearance.   HENT:      Head: Normocephalic and atraumatic.      Nose: Nose normal.   Eyes:      Extraocular Movements: Extraocular movements intact.      Conjunctiva/sclera: Conjunctivae normal.   Pulmonary:      Effort: Pulmonary effort is normal. No respiratory distress.   Musculoskeletal:         General: Swelling (swelling over right 1st and 2nd MCP, left 1st mcp and 4th mcp) present. Tenderness:         Comments: ROM of hands are limited as pt is unable to fully make a fists w/ both hands.       Skin:     General: Skin is warm and dry.   Neurological:      General: No focal deficit present.      Mental Status: She is alert and oriented to person, place, and time.      Cranial Nerves: No cranial nerve deficit.   Psychiatric:         Mood and Affect: Mood normal.         Behavior: Behavior normal.         Thought Content: Thought content normal.             Result Review :                           Assessment and Plan      Diagnoses and all orders for this visit:    1. Arthritis (Primary)  2. High cyclic citrullinated peptide (CCP) antibody level  3. Arthritis of both hands  Chronic and active. Again discussed possible treatment options with physical therapy, referral to orthopedic hand specialist or pain management for consideration for injections, continued use of NSAIDs prn. Pt would like to proceed with physical therapy. Referral placed. Toradol injection administered in office. Toradol tablet to be used prn sent in. Pt     -     ketorolac (TORADOL) 10 MG tablet; Take 1 tablet by mouth Every 6 (Six) Hours As Needed for Moderate Pain for up to 30 days.  Dispense: 30 tablet; Refill: 0  -     ketorolac (TORADOL) injection 60 mg  _ Physical " therapy                       Follow Up     Return in about 6 weeks (around 4/21/2023).    Patient was given instructions and counseling regarding her condition or for health maintenance advice. Please see specific information pulled into the AVS if appropriate.     Wen Leblanc MD   Internal Medicine-Pediatrics

## 2023-03-14 DIAGNOSIS — M19.042 ARTHRITIS OF BOTH HANDS: Primary | ICD-10-CM

## 2023-03-14 DIAGNOSIS — M19.041 ARTHRITIS OF BOTH HANDS: Primary | ICD-10-CM

## 2023-03-21 ENCOUNTER — HOSPITAL ENCOUNTER (OUTPATIENT)
Dept: BONE DENSITY | Facility: HOSPITAL | Age: 78
Discharge: HOME | End: 2023-03-21
Admitting: STUDENT IN AN ORGANIZED HEALTH CARE EDUCATION/TRAINING PROGRAM
Payer: MEDICARE

## 2023-03-21 ENCOUNTER — APPOINTMENT (OUTPATIENT)
Dept: BONE DENSITY | Facility: HOSPITAL | Age: 78
End: 2023-03-21
Payer: MEDICARE

## 2023-03-21 ENCOUNTER — HOSPITAL ENCOUNTER (OUTPATIENT)
Dept: MAMMOGRAPHY | Facility: HOSPITAL | Age: 78
Discharge: HOME | End: 2023-03-21
Admitting: STUDENT IN AN ORGANIZED HEALTH CARE EDUCATION/TRAINING PROGRAM
Payer: MEDICARE

## 2023-03-21 DIAGNOSIS — Z12.31 BREAST CANCER SCREENING BY MAMMOGRAM: ICD-10-CM

## 2023-03-21 PROCEDURE — 77063 BREAST TOMOSYNTHESIS BI: CPT

## 2023-03-21 PROCEDURE — 77067 SCR MAMMO BI INCL CAD: CPT

## 2023-03-21 PROCEDURE — 77080 DXA BONE DENSITY AXIAL: CPT

## 2023-03-24 ENCOUNTER — CLINICAL SUPPORT (OUTPATIENT)
Dept: INTERNAL MEDICINE | Facility: CLINIC | Age: 78
End: 2023-03-24
Payer: MEDICARE

## 2023-03-24 DIAGNOSIS — R94.6 ABNORMAL RESULTS OF THYROID FUNCTION STUDIES: ICD-10-CM

## 2023-03-24 DIAGNOSIS — R79.89 ABNORMAL THYROID BLOOD TEST: ICD-10-CM

## 2023-03-24 LAB
T4 FREE SERPL-MCNC: 1.83 NG/DL (ref 0.93–1.7)
TSH SERPL DL<=0.05 MIU/L-ACNC: 0.05 UIU/ML (ref 0.27–4.2)

## 2023-03-24 PROCEDURE — 84439 ASSAY OF FREE THYROXINE: CPT | Performed by: STUDENT IN AN ORGANIZED HEALTH CARE EDUCATION/TRAINING PROGRAM

## 2023-03-24 PROCEDURE — 84443 ASSAY THYROID STIM HORMONE: CPT | Performed by: STUDENT IN AN ORGANIZED HEALTH CARE EDUCATION/TRAINING PROGRAM

## 2023-03-30 ENCOUNTER — OFFICE VISIT (OUTPATIENT)
Dept: INTERNAL MEDICINE | Facility: CLINIC | Age: 78
End: 2023-03-30
Payer: MEDICARE

## 2023-03-30 VITALS
TEMPERATURE: 97.9 F | HEIGHT: 61 IN | DIASTOLIC BLOOD PRESSURE: 68 MMHG | BODY MASS INDEX: 29.27 KG/M2 | HEART RATE: 100 BPM | OXYGEN SATURATION: 97 % | WEIGHT: 155 LBS | RESPIRATION RATE: 18 BRPM | SYSTOLIC BLOOD PRESSURE: 110 MMHG

## 2023-03-30 DIAGNOSIS — M85.80 OSTEOPENIA, UNSPECIFIED LOCATION: ICD-10-CM

## 2023-03-30 DIAGNOSIS — E03.9 HYPOTHYROIDISM, UNSPECIFIED TYPE: ICD-10-CM

## 2023-03-30 DIAGNOSIS — M19.90 ARTHRITIS: ICD-10-CM

## 2023-03-30 DIAGNOSIS — R79.89 ABNORMAL THYROID BLOOD TEST: Primary | ICD-10-CM

## 2023-03-30 NOTE — PROGRESS NOTES
Chief Complaint  medication (Follow up on synthroid medication)    Subjective            Nancie Grissom presents to [facility]  History of Present Illness      Hypothyroidism:   Abnormal thyroid labs.    Presenting lab results review.   Has multivitamins with biotin x3, and all 3 have biotin, 36mcg x2 and 30mcg of     Arthritis:  Over bilateral hands.   Chronic, Rheum apt is not until June.   Agreed to pursue physical therapy and has first appointment scheduled for 4/3.             Past Medical History:   Diagnosis Date   • Arthritis    • Depression    • GERD (gastroesophageal reflux disease) 02/12/2015   • Hyperlipidemia    • Hypertension    • Hypothyroidism    • Osteoporosis    • Vitamin D deficiency        Allergies:   No Known Allergies       Past Surgical History:   Procedure Laterality Date   • ROTATOR CUFF REPAIR Right           Social History     Socioeconomic History   • Marital status:    Tobacco Use   • Smoking status: Never   • Smokeless tobacco: Never   Vaping Use   • Vaping Use: Never used   Substance and Sexual Activity   • Alcohol use: Yes     Comment: rarely   • Drug use: Never   • Sexual activity: Defer         History reviewed. No pertinent family history.       Health Maintenance Due   Topic Date Due   • TDAP/TD VACCINES (1 - Tdap) Never done   • ZOSTER VACCINE (1 of 2) Never done   • Pneumococcal Vaccine 65+ (2 - PCV) 08/12/2015   • COVID-19 Vaccine (2 - Booster for Sean series) 05/31/2021            Current Outpatient Medications:   •  amitriptyline (ELAVIL) 10 MG tablet, Take 1 tablet by mouth Every Night., Disp: 90 tablet, Rfl: 1  •  atorvastatin (LIPITOR) 20 MG tablet, Take 1 tablet by mouth Every Night., Disp: 90 tablet, Rfl: 1  •  Diclofenac Sodium (VOLTAREN) 1 % gel gel, Apply  topically to the appropriate area as directed 4 (Four) Times a Day., Disp: 150 g, Rfl: 3  •  hydroCHLOROthiazide (HYDRODIURIL) 25 MG tablet, Take 1 tablet by  "mouth Daily., Disp: 90 tablet, Rfl: 1  •  levothyroxine (Synthroid) 75 MCG tablet, Take 1 tablet by mouth Daily for 60 days., Disp: 60 tablet, Rfl: 1  •  lisinopril (PRINIVIL,ZESTRIL) 10 MG tablet, Take 1 tablet by mouth Daily., Disp: 90 tablet, Rfl: 1  •  naproxen sodium (Aleve) 220 MG tablet, Take 1 tablet by mouth 2 (Two) Times a Day As Needed for Mild Pain for up to 60 doses., Disp: 60 tablet, Rfl: 3  •  pantoprazole (Protonix) 40 MG EC tablet, Take 1 tablet by mouth Daily., Disp: 90 tablet, Rfl: 1      Immunization History   Administered Date(s) Administered   • COVID-19 (CINTHIA) 04/05/2021   • Flu Vaccine Quad PF >36MO 02/09/2018   • Fluzone High-Dose 65+yrs 10/26/2022   • Influenza, Unspecified 10/21/2019   • Pneumococcal Polysaccharide (PPSV23) 08/12/2014         Review of Systems       Objective       Vitals:    03/30/23 1603   BP: 110/68   BP Location: Right arm   Patient Position: Standing   Cuff Size: Adult   Pulse: 100   Resp: 18   Temp: 97.9 °F (36.6 °C)   SpO2: 97%   Weight: 70.3 kg (155 lb)   Height: 156.2 cm (61.5\")     Body mass index is 28.82 kg/m².      Physical Exam  Vitals reviewed.   Constitutional:       Appearance: Normal appearance.   HENT:      Head: Normocephalic and atraumatic.      Nose: Nose normal.   Eyes:      Extraocular Movements: Extraocular movements intact.      Conjunctiva/sclera: Conjunctivae normal.   Cardiovascular:      Rate and Rhythm: Normal rate and regular rhythm.      Pulses: Normal pulses.      Heart sounds: Normal heart sounds.   Pulmonary:      Effort: Pulmonary effort is normal. No respiratory distress.   Musculoskeletal:         General: Normal range of motion.   Skin:     General: Skin is warm and dry.   Neurological:      General: No focal deficit present.      Mental Status: She is alert and oriented to person, place, and time.      Cranial Nerves: No cranial nerve deficit.   Psychiatric:         Mood and Affect: Mood normal.         Behavior: Behavior normal. "         Thought Content: Thought content normal.             Result Review :                           Assessment and Plan      Diagnoses and all orders for this visit:    1. Abnormal thyroid blood test (Primary)  Comments:  Pt w/ multiple supplements containing biotin. Recommend she stops these for the next 2 wks w/ plan for repeat labs.   Orders:  -     TSH; Future  -     T4, Free; Future    2. Arthritis  Comments:  Chronic, w/ elevated cpp. Referred to rheumatology. Has plans to start physical therapy next week.     3. Osteopenia, unspecified location  Comments:  Chronic. On vit D and calcium. Due for labs.   Orders:  -     Vitamin D 25 hydroxy; Future  -     Comprehensive metabolic panel; Future    4. Hypothyroidism, unspecified type  Comments:  Chronic, no change in synthroid dose for now. See above.   Orders:  -     TSH; Future  -     T4, Free; Future                  Follow Up     Return in about 6 weeks (around 5/11/2023) for hypothyroidism.    Patient was given instructions and counseling regarding her condition or for health maintenance advice. Please see specific information pulled into the AVS if appropriate.     Wen Leblanc MD   Internal Medicine-Pediatrics

## 2023-04-03 ENCOUNTER — TREATMENT (OUTPATIENT)
Dept: PHYSICAL THERAPY | Facility: CLINIC | Age: 78
End: 2023-04-03
Payer: MEDICARE

## 2023-04-03 DIAGNOSIS — M25.641 DECREASED RANGE OF MOTION OF FINGERS OF BOTH HANDS: ICD-10-CM

## 2023-04-03 DIAGNOSIS — M79.641 BILATERAL HAND PAIN: Primary | ICD-10-CM

## 2023-04-03 DIAGNOSIS — M79.642 BILATERAL HAND PAIN: Primary | ICD-10-CM

## 2023-04-03 DIAGNOSIS — M25.642 DECREASED RANGE OF MOTION OF FINGERS OF BOTH HANDS: ICD-10-CM

## 2023-04-03 PROCEDURE — 97165 OT EVAL LOW COMPLEX 30 MIN: CPT | Performed by: OCCUPATIONAL THERAPIST

## 2023-04-03 PROCEDURE — 97530 THERAPEUTIC ACTIVITIES: CPT | Performed by: OCCUPATIONAL THERAPIST

## 2023-04-03 NOTE — PROGRESS NOTES
"Occupational Therapy Initial Evaluation and Plan of Care  [place]  OT: [address] [place] [id number]    Patient: Nancie Grissom   : 1945  Diagnosis/ICD-10 Code:  Bilateral hand pain [M79.641, M79.642]  Referring practitioner: Wen Leblanc MD  Date of Initial Visit: 4/3/2023  Today's Date: 4/3/2023  Patient seen for 1 sessions           Subjective Questionnaire: QuickDASH:       Subjective Evaluation    History of Present Illness  Mechanism of injury: Pt is a 78 y/o RHD female who presents to therapy with c/o B hand pain and decreased ROM. Pt reports sometime early in  she woke up and her R hand was stiff and swollen. Pt reports symptoms fluctuate between both hands. Pt reports in January she received a Toradol injection with good relief for approximately 24 hours but symptoms returned. Pt is retired.    Pain  Current pain ratin  At best pain ratin  At worst pain ratin  Location: B wrists, MPs, PIPs  Quality: burning  Relieving factors: medications, heat and change in position  Exacerbated by: pressure.  Progression: no change    Social Support  Lives with: alone    Hand dominance: right    Diagnostic Tests  X-ray: abnormal    Treatments  Previous treatment: medication  Current treatment: medication  Patient Goals  Patient goals for therapy: decreased edema, decreased pain, increased motion, independence with ADLs/IADLs and return to sport/leisure activities  Patient goal: \"I want to not be in so much pain and be able to grab things\"           Objective          Tenderness     Additional Tenderness Details  L MP joints.    Neurological Testing     Additional Neurological Details  Pt scored WNL on monofilament testing.    Active Range of Motion     Left Wrist   Wrist flexion: 20 degrees   Wrist extension: 34 degrees   Radial deviation: 28 degrees   Ulnar deviation: WFL      Right Wrist   Wrist flexion: 30 degrees   Wrist extension: 38 degrees   Radial deviation: 30 degrees   Ulnar " deviation: WFL    Left Thumb   Opposition: Pt able to oppose to base of small finger with pain.    Right Thumb   Opposition: Pt able to oppose to base of small finger with no pain.    Additional Active Range of Motion Details  Pt able to make composite fist.    Strength/Myotome Testing     Additional Strength Details  Deferred.    Swelling     Left Wrist/Hand   Circumference MCP: 20.5 cm    Right Wrist/Hand   Circumference MCP: 21 cm          Assessment & Plan     Assessment    Assessment details: Pt will not be picked up for future visits. Exercises/education provided. OT discussed use of adaptive equipment and other joint protection strategies. Teachback successful.      Pt is indicated for skilled occupational therapy services but will be eval only with education provided.  Timed:            Therapeutic Activity:     8     mins  [id number];       Un-Timed:  Low Eval     45     Mins  [id number]    Timed Treatment:   8   mins   Total Treatment:     53   mins    OT SIGNATURE: Joseph Crowe OT   DATE TREATMENT INITIATED: 4/3/2023  [place] License: [id number]    Initial Certification  Certification Period: 7/1/2023  I certify that the therapy services are furnished while this patient is under my care.  The services outlined above are required by this patient, and will be reviewed every 90 days.     PHYSICIAN: Wen Leblanc MD      DATE:   MD NPI: [id number]  Please sign and return via fax to   [phone].. Thank you, [facility].

## 2023-05-09 ENCOUNTER — CLINICAL SUPPORT (OUTPATIENT)
Dept: INTERNAL MEDICINE | Facility: CLINIC | Age: 78
End: 2023-05-09
Payer: MEDICARE

## 2023-05-09 DIAGNOSIS — R79.89 ABNORMAL THYROID BLOOD TEST: ICD-10-CM

## 2023-05-09 DIAGNOSIS — E03.9 HYPOTHYROIDISM, UNSPECIFIED TYPE: ICD-10-CM

## 2023-05-09 DIAGNOSIS — M85.80 OSTEOPENIA, UNSPECIFIED LOCATION: ICD-10-CM

## 2023-05-09 LAB
25(OH)D3 SERPL-MCNC: 32.3 NG/ML (ref 30–100)
ALBUMIN SERPL-MCNC: 4.1 G/DL (ref 3.5–5.2)
ALBUMIN/GLOB SERPL: 1.5 G/DL
ALP SERPL-CCNC: 71 U/L (ref 39–117)
ALT SERPL W P-5'-P-CCNC: 15 U/L (ref 1–33)
ANION GAP SERPL CALCULATED.3IONS-SCNC: 11 MMOL/L (ref 5–15)
AST SERPL-CCNC: 26 U/L (ref 1–32)
BILIRUB SERPL-MCNC: 0.3 MG/DL (ref 0–1.2)
BUN SERPL-MCNC: 16 MG/DL (ref 8–23)
BUN/CREAT SERPL: 14.2 (ref 7–25)
CALCIUM SPEC-SCNC: 9 MG/DL (ref 8.6–10.5)
CHLORIDE SERPL-SCNC: 104 MMOL/L (ref 98–107)
CO2 SERPL-SCNC: 25 MMOL/L (ref 22–29)
CREAT SERPL-MCNC: 1.13 MG/DL (ref 0.57–1)
EGFRCR SERPLBLD CKD-EPI 2021: 49.6 ML/MIN/1.73
GLOBULIN UR ELPH-MCNC: 2.8 GM/DL
GLUCOSE SERPL-MCNC: 111 MG/DL (ref 65–99)
POTASSIUM SERPL-SCNC: 4.2 MMOL/L (ref 3.5–5.2)
PROT SERPL-MCNC: 6.9 G/DL (ref 6–8.5)
SODIUM SERPL-SCNC: 140 MMOL/L (ref 136–145)
T4 FREE SERPL-MCNC: 1.86 NG/DL (ref 0.93–1.7)
TSH SERPL DL<=0.05 MIU/L-ACNC: 0.05 UIU/ML (ref 0.27–4.2)

## 2023-05-09 PROCEDURE — 84439 ASSAY OF FREE THYROXINE: CPT | Performed by: STUDENT IN AN ORGANIZED HEALTH CARE EDUCATION/TRAINING PROGRAM

## 2023-05-09 PROCEDURE — 80053 COMPREHEN METABOLIC PANEL: CPT | Performed by: STUDENT IN AN ORGANIZED HEALTH CARE EDUCATION/TRAINING PROGRAM

## 2023-05-09 PROCEDURE — 36415 COLL VENOUS BLD VENIPUNCTURE: CPT | Performed by: STUDENT IN AN ORGANIZED HEALTH CARE EDUCATION/TRAINING PROGRAM

## 2023-05-09 PROCEDURE — 84443 ASSAY THYROID STIM HORMONE: CPT | Performed by: STUDENT IN AN ORGANIZED HEALTH CARE EDUCATION/TRAINING PROGRAM

## 2023-05-09 PROCEDURE — 82306 VITAMIN D 25 HYDROXY: CPT | Performed by: STUDENT IN AN ORGANIZED HEALTH CARE EDUCATION/TRAINING PROGRAM

## 2023-05-09 NOTE — PROGRESS NOTES
Venipuncture Blood Specimen Collection  Venipuncture performed in Right AC by Pat Elizabeth MA with good hemostasis. Patient tolerated the procedure well without complications.   05/09/23   Pat Elizabeth MA

## 2023-05-11 ENCOUNTER — OFFICE VISIT (OUTPATIENT)
Dept: INTERNAL MEDICINE | Facility: CLINIC | Age: 78
End: 2023-05-11
Payer: MEDICARE

## 2023-05-11 VITALS
SYSTOLIC BLOOD PRESSURE: 130 MMHG | RESPIRATION RATE: 18 BRPM | WEIGHT: 155.2 LBS | HEART RATE: 94 BPM | OXYGEN SATURATION: 99 % | TEMPERATURE: 97.5 F | BODY MASS INDEX: 29.3 KG/M2 | HEIGHT: 61 IN | DIASTOLIC BLOOD PRESSURE: 72 MMHG

## 2023-05-11 DIAGNOSIS — R05.1 ACUTE COUGH: ICD-10-CM

## 2023-05-11 DIAGNOSIS — N18.31 STAGE 3A CHRONIC KIDNEY DISEASE: ICD-10-CM

## 2023-05-11 DIAGNOSIS — E03.9 HYPOTHYROIDISM, UNSPECIFIED TYPE: Primary | ICD-10-CM

## 2023-05-11 PROCEDURE — 3075F SYST BP GE 130 - 139MM HG: CPT | Performed by: STUDENT IN AN ORGANIZED HEALTH CARE EDUCATION/TRAINING PROGRAM

## 2023-05-11 PROCEDURE — 3078F DIAST BP <80 MM HG: CPT | Performed by: STUDENT IN AN ORGANIZED HEALTH CARE EDUCATION/TRAINING PROGRAM

## 2023-05-11 PROCEDURE — 1160F RVW MEDS BY RX/DR IN RCRD: CPT | Performed by: STUDENT IN AN ORGANIZED HEALTH CARE EDUCATION/TRAINING PROGRAM

## 2023-05-11 PROCEDURE — 99214 OFFICE O/P EST MOD 30 MIN: CPT | Performed by: STUDENT IN AN ORGANIZED HEALTH CARE EDUCATION/TRAINING PROGRAM

## 2023-05-11 PROCEDURE — 1159F MED LIST DOCD IN RCRD: CPT | Performed by: STUDENT IN AN ORGANIZED HEALTH CARE EDUCATION/TRAINING PROGRAM

## 2023-05-11 RX ORDER — LEVOTHYROXINE SODIUM 0.05 MG/1
50 TABLET ORAL DAILY
Qty: 60 TABLET | Refills: 1 | Status: SHIPPED | OUTPATIENT
Start: 2023-05-11 | End: 2023-07-10

## 2023-05-11 RX ORDER — BENZONATATE 100 MG/1
100 CAPSULE ORAL 3 TIMES DAILY PRN
Qty: 30 CAPSULE | Refills: 1 | Status: SHIPPED | OUTPATIENT
Start: 2023-05-11

## 2023-05-11 NOTE — PROGRESS NOTES
Chief Complaint  Hypothyroidism (6 week follow up/Would like to discuss levothyroxine.)    Subjective            Nancie Grissom presents to [facility]  History of Present Illness    Hypothyroidism:   Presenting for lab result review.   Did stop taking biotin containing products since last visit.       Cough:   Occurring at night, mostly. Did test at home for covid which was negative.   Cough has been ongoing for 1 week.     Presenting for lab results review.      Past Medical History:   Diagnosis Date   • Arthritis    • Depression    • GERD (gastroesophageal reflux disease) 02/12/2015   • Hyperlipidemia    • Hypertension    • Hypothyroidism    • Osteoporosis    • Vitamin D deficiency        Allergies:   No Known Allergies       Past Surgical History:   Procedure Laterality Date   • ROTATOR CUFF REPAIR Right           Social History     Socioeconomic History   • Marital status:    Tobacco Use   • Smoking status: Never   • Smokeless tobacco: Never   Vaping Use   • Vaping Use: Never used   Substance and Sexual Activity   • Alcohol use: Yes     Comment: rarely   • Drug use: Never   • Sexual activity: Defer         History reviewed. No pertinent family history.       Health Maintenance Due   Topic Date Due   • TDAP/TD VACCINES (1 - Tdap) Never done   • ZOSTER VACCINE (1 of 2) Never done   • Pneumococcal Vaccine 65+ (2 - PCV) 08/12/2015   • COVID-19 Vaccine (2 - Booster for Sean series) 05/31/2021            Current Outpatient Medications:   •  amitriptyline (ELAVIL) 10 MG tablet, Take 1 tablet by mouth Every Night., Disp: 90 tablet, Rfl: 1  •  atorvastatin (LIPITOR) 20 MG tablet, Take 1 tablet by mouth Every Night., Disp: 90 tablet, Rfl: 1  •  Diclofenac Sodium (VOLTAREN) 1 % gel gel, Apply  topically to the appropriate area as directed 4 (Four) Times a Day., Disp: 150 g, Rfl: 3  •  hydroCHLOROthiazide (HYDRODIURIL) 25 MG tablet, Take 1 tablet by mouth Daily., Disp:  "90 tablet, Rfl: 1  •  lisinopril (PRINIVIL,ZESTRIL) 10 MG tablet, Take 1 tablet by mouth Daily., Disp: 90 tablet, Rfl: 1  •  naproxen sodium (Aleve) 220 MG tablet, Take 1 tablet by mouth 2 (Two) Times a Day As Needed for Mild Pain for up to 60 doses., Disp: 60 tablet, Rfl: 3  •  pantoprazole (Protonix) 40 MG EC tablet, Take 1 tablet by mouth Daily., Disp: 90 tablet, Rfl: 1  •  benzonatate (Tessalon Perles) 100 MG capsule, Take 1 capsule by mouth 3 (Three) Times a Day As Needed for Cough for up to 30 doses., Disp: 30 capsule, Rfl: 1  •  levothyroxine (Synthroid) 50 MCG tablet, Take 1 tablet by mouth Daily for 60 days., Disp: 60 tablet, Rfl: 1      Immunization History   Administered Date(s) Administered   • COVID-19 (CINTHIA) 04/05/2021   • Flu Vaccine Quad PF >36MO 02/09/2018   • Fluzone High-Dose 65+yrs 10/26/2022   • Influenza, Unspecified 10/21/2019   • Pneumococcal Polysaccharide (PPSV23) 08/12/2014         Review of Systems       Objective       Vitals:    05/11/23 1423   BP: 130/72   BP Location: Left arm   Patient Position: Sitting   Cuff Size: Adult   Pulse: 94   Resp: 18   Temp: 97.5 °F (36.4 °C)   SpO2: 99%   Weight: 70.4 kg (155 lb 3.2 oz)   Height: 156.2 cm (61.5\")     Body mass index is 28.85 kg/m².      Physical Exam  Vitals reviewed.   Constitutional:       Appearance: Normal appearance.   HENT:      Head: Normocephalic and atraumatic.      Nose: Congestion present. No rhinorrhea.      Comments: Mild drainage over posterior pharynx.        Mouth/Throat:      Mouth: Mucous membranes are moist.      Pharynx: No oropharyngeal exudate.   Eyes:      Extraocular Movements: Extraocular movements intact.      Conjunctiva/sclera: Conjunctivae normal.   Cardiovascular:      Rate and Rhythm: Normal rate and regular rhythm.      Pulses: Normal pulses.      Heart sounds: Normal heart sounds.   Pulmonary:      Effort: Pulmonary effort is normal. No respiratory distress.      Breath sounds: Normal breath sounds. "   Musculoskeletal:         General: Normal range of motion.   Skin:     General: Skin is warm and dry.   Neurological:      General: No focal deficit present.      Mental Status: She is alert and oriented to person, place, and time.      Cranial Nerves: No cranial nerve deficit.   Psychiatric:         Mood and Affect: Mood normal.         Behavior: Behavior normal.         Thought Content: Thought content normal.         Result Review :     The following data was reviewed by: Wen Leblanc MD on 05/11/2023:    Common labs        7/18/2022    16:59 12/27/2022    15:17 5/9/2023    11:33   Common Labs   Glucose 80   91   111     BUN 12   18   16     Creatinine 1.02   1.06   1.13     Sodium 140   136   140     Potassium 4.2   4.0   4.2     Chloride 106   99   104     Calcium 9.2   9.2   9.0     Albumin 4.50   4.6   4.1     Total Bilirubin 0.4   0.4   0.3     Alkaline Phosphatase 65   84   71     AST (SGOT) 20   20   26     ALT (SGPT) 14   15   15     WBC 7.25   7.58      Hemoglobin 13.3   13.7      Hematocrit 39.6   41.4      Platelets 184   216      Total Cholesterol 160   147      Triglycerides 174   171      HDL Cholesterol 44   40      LDL Cholesterol  86   78                        Assessment and Plan      Diagnoses and all orders for this visit:    1. Hypothyroidism, unspecified type (Primary)  Comments:  Chronic, thyroid labs remain abnormal w/ low tsh and high free t4. decreasing synthroid dose to 50mcg from 75mcg w/ plan for f/u in 6 wks w/ repeat labs.   Orders:  -     levothyroxine (Synthroid) 50 MCG tablet; Take 1 tablet by mouth Daily for 60 days.  Dispense: 60 tablet; Refill: 1  -     TSH; Future  -     T4, Free; Future    2. Stage 3a chronic kidney disease  Comments:  Chronic, most likely secondary to long standing HTN.  stable.   Orders:  -     Basic metabolic panel; Future    3. Acute cough  Comments:  Concerning for PND from allergies. Recommend otc nasal spray to help with nasal congestion.  Tessalon perles sent in.   Orders:  -     benzonatate (Tessalon Perles) 100 MG capsule; Take 1 capsule by mouth 3 (Three) Times a Day As Needed for Cough for up to 30 doses.  Dispense: 30 capsule; Refill: 1            Follow Up     Return in about 6 weeks (around 6/22/2023) for Hypothyroidism.    Patient was given instructions and counseling regarding her condition or for health maintenance advice. Please see specific information pulled into the AVS if appropriate.     Wen Leblanc MD   Internal Medicine-Pediatrics

## 2023-07-26 DIAGNOSIS — Z76.0 MEDICATION REFILL: ICD-10-CM

## 2023-07-26 RX ORDER — ATORVASTATIN CALCIUM 20 MG/1
20 TABLET, FILM COATED ORAL NIGHTLY
Qty: 90 TABLET | Refills: 1 | Status: SHIPPED | OUTPATIENT
Start: 2023-07-26

## 2023-10-17 ENCOUNTER — OFFICE VISIT (OUTPATIENT)
Dept: INTERNAL MEDICINE | Facility: CLINIC | Age: 78
End: 2023-10-17
Payer: MEDICARE

## 2023-10-17 VITALS
TEMPERATURE: 97.5 F | SYSTOLIC BLOOD PRESSURE: 128 MMHG | HEIGHT: 62 IN | HEART RATE: 80 BPM | OXYGEN SATURATION: 100 % | WEIGHT: 153 LBS | BODY MASS INDEX: 28.16 KG/M2 | DIASTOLIC BLOOD PRESSURE: 76 MMHG

## 2023-10-17 DIAGNOSIS — M05.741 RHEUMATOID ARTHRITIS INVOLVING BOTH HANDS WITH POSITIVE RHEUMATOID FACTOR: ICD-10-CM

## 2023-10-17 DIAGNOSIS — Z23 IMMUNIZATION DUE: ICD-10-CM

## 2023-10-17 DIAGNOSIS — N18.31 STAGE 3A CHRONIC KIDNEY DISEASE: ICD-10-CM

## 2023-10-17 DIAGNOSIS — M05.742 RHEUMATOID ARTHRITIS INVOLVING BOTH HANDS WITH POSITIVE RHEUMATOID FACTOR: ICD-10-CM

## 2023-10-17 DIAGNOSIS — E03.9 HYPOTHYROIDISM, UNSPECIFIED TYPE: Primary | ICD-10-CM

## 2023-10-17 LAB
ALBUMIN SERPL-MCNC: 4.5 G/DL (ref 3.5–5.2)
ALBUMIN/GLOB SERPL: 1.6 G/DL
ALP SERPL-CCNC: 69 U/L (ref 39–117)
ALT SERPL W P-5'-P-CCNC: 17 U/L (ref 1–33)
ANION GAP SERPL CALCULATED.3IONS-SCNC: 10 MMOL/L (ref 5–15)
AST SERPL-CCNC: 22 U/L (ref 1–32)
BASOPHILS # BLD AUTO: 0.08 10*3/MM3 (ref 0–0.2)
BASOPHILS NFR BLD AUTO: 0.8 % (ref 0–1.5)
BILIRUB SERPL-MCNC: 0.4 MG/DL (ref 0–1.2)
BUN SERPL-MCNC: 17 MG/DL (ref 8–23)
BUN/CREAT SERPL: 15.3 (ref 7–25)
CALCIUM SPEC-SCNC: 9.8 MG/DL (ref 8.6–10.5)
CHLORIDE SERPL-SCNC: 102 MMOL/L (ref 98–107)
CHOLEST SERPL-MCNC: 183 MG/DL (ref 0–200)
CO2 SERPL-SCNC: 28 MMOL/L (ref 22–29)
CREAT SERPL-MCNC: 1.11 MG/DL (ref 0.57–1)
DEPRECATED RDW RBC AUTO: 48.1 FL (ref 37–54)
EGFRCR SERPLBLD CKD-EPI 2021: 50.4 ML/MIN/1.73
EOSINOPHIL # BLD AUTO: 0.32 10*3/MM3 (ref 0–0.4)
EOSINOPHIL NFR BLD AUTO: 3.3 % (ref 0.3–6.2)
ERYTHROCYTE [DISTWIDTH] IN BLOOD BY AUTOMATED COUNT: 14.4 % (ref 12.3–15.4)
GLOBULIN UR ELPH-MCNC: 2.8 GM/DL
GLUCOSE SERPL-MCNC: 89 MG/DL (ref 65–99)
HCT VFR BLD AUTO: 41.2 % (ref 34–46.6)
HDLC SERPL-MCNC: 41 MG/DL (ref 40–60)
HGB BLD-MCNC: 13.8 G/DL (ref 12–15.9)
IMM GRANULOCYTES # BLD AUTO: 0.04 10*3/MM3 (ref 0–0.05)
IMM GRANULOCYTES NFR BLD AUTO: 0.4 % (ref 0–0.5)
LDLC SERPL CALC-MCNC: 106 MG/DL (ref 0–100)
LDLC/HDLC SERPL: 2.46 {RATIO}
LYMPHOCYTES # BLD AUTO: 1.72 10*3/MM3 (ref 0.7–3.1)
LYMPHOCYTES NFR BLD AUTO: 17.7 % (ref 19.6–45.3)
MCH RBC QN AUTO: 31.4 PG (ref 26.6–33)
MCHC RBC AUTO-ENTMCNC: 33.5 G/DL (ref 31.5–35.7)
MCV RBC AUTO: 93.8 FL (ref 79–97)
MONOCYTES # BLD AUTO: 0.55 10*3/MM3 (ref 0.1–0.9)
MONOCYTES NFR BLD AUTO: 5.7 % (ref 5–12)
NEUTROPHILS NFR BLD AUTO: 6.99 10*3/MM3 (ref 1.7–7)
NEUTROPHILS NFR BLD AUTO: 72.1 % (ref 42.7–76)
NRBC BLD AUTO-RTO: 0 /100 WBC (ref 0–0.2)
PLATELET # BLD AUTO: 239 10*3/MM3 (ref 140–450)
PMV BLD AUTO: 11.1 FL (ref 6–12)
POTASSIUM SERPL-SCNC: 4.5 MMOL/L (ref 3.5–5.2)
PROT SERPL-MCNC: 7.3 G/DL (ref 6–8.5)
RBC # BLD AUTO: 4.39 10*6/MM3 (ref 3.77–5.28)
SODIUM SERPL-SCNC: 140 MMOL/L (ref 136–145)
T4 FREE SERPL-MCNC: 1.37 NG/DL (ref 0.93–1.7)
TRIGL SERPL-MCNC: 205 MG/DL (ref 0–150)
TSH SERPL DL<=0.05 MIU/L-ACNC: 2.41 UIU/ML (ref 0.27–4.2)
VLDLC SERPL-MCNC: 36 MG/DL (ref 5–40)
WBC NRBC COR # BLD: 9.7 10*3/MM3 (ref 3.4–10.8)

## 2023-10-17 PROCEDURE — 80061 LIPID PANEL: CPT | Performed by: STUDENT IN AN ORGANIZED HEALTH CARE EDUCATION/TRAINING PROGRAM

## 2023-10-17 PROCEDURE — 80053 COMPREHEN METABOLIC PANEL: CPT | Performed by: STUDENT IN AN ORGANIZED HEALTH CARE EDUCATION/TRAINING PROGRAM

## 2023-10-17 PROCEDURE — 84439 ASSAY OF FREE THYROXINE: CPT | Performed by: STUDENT IN AN ORGANIZED HEALTH CARE EDUCATION/TRAINING PROGRAM

## 2023-10-17 PROCEDURE — 85025 COMPLETE CBC W/AUTO DIFF WBC: CPT | Performed by: STUDENT IN AN ORGANIZED HEALTH CARE EDUCATION/TRAINING PROGRAM

## 2023-10-17 PROCEDURE — 84443 ASSAY THYROID STIM HORMONE: CPT | Performed by: STUDENT IN AN ORGANIZED HEALTH CARE EDUCATION/TRAINING PROGRAM

## 2023-10-17 NOTE — PROGRESS NOTES
Chief Complaint  Hypothyroidism    Subjective            Nancie Grissom presents to [facility]  History of Present Illness    Joint pain:   Rheumatoid arthritis:  Improved significantly since starting on MTX.   Dose was increased at her last visit with her rheumatologist last week and MTX is now 2.5mg, 5 pills weekly.     Hypothyroidism:  Presenting for labs.      Past Medical History:   Diagnosis Date    Arthritis     Depression     GERD (gastroesophageal reflux disease) 02/12/2015    Hyperlipidemia     Hypertension     Hypothyroidism     Osteoporosis     Vitamin D deficiency        Allergies:   No Known Allergies       Past Surgical History:   Procedure Laterality Date    ROTATOR CUFF REPAIR Right           Social History     Socioeconomic History    Marital status:    Tobacco Use    Smoking status: Never    Smokeless tobacco: Never   Vaping Use    Vaping Use: Never used   Substance and Sexual Activity    Alcohol use: Yes     Comment: rarely    Drug use: Never    Sexual activity: Defer         History reviewed. No pertinent family history.       Health Maintenance Due   Topic Date Due    TDAP/TD VACCINES (1 - Tdap) Never done    ZOSTER VACCINE (1 of 2) Never done    COVID-19 Vaccine (2 - 2023-24 season) 09/01/2023            Current Outpatient Medications:     amitriptyline (ELAVIL) 10 MG tablet, Take 1 tablet by mouth Every Night., Disp: 90 tablet, Rfl: 1    atorvastatin (LIPITOR) 20 MG tablet, Take 1 tablet by mouth Every Night., Disp: 90 tablet, Rfl: 1    Diclofenac Sodium (VOLTAREN) 1 % gel gel, Apply  topically to the appropriate area as directed 4 (Four) Times a Day., Disp: 150 g, Rfl: 3    folic acid (FOLVITE) 1 MG tablet, Take 1 tablet by mouth Daily., Disp: , Rfl:     hydroCHLOROthiazide (HYDRODIURIL) 25 MG tablet, Take 1 tablet by mouth Daily., Disp: 90 tablet, Rfl: 1    levothyroxine (Synthroid) 50 MCG tablet, Take 1 tablet by mouth Daily for 90  "days., Disp: 90 tablet, Rfl: 1    lisinopril (PRINIVIL,ZESTRIL) 10 MG tablet, Take 1 tablet by mouth Daily., Disp: 90 tablet, Rfl: 1    methotrexate 2.5 MG tablet, Take 5 tablets by mouth 1 (One) Time Per Week., Disp: , Rfl:     pantoprazole (Protonix) 40 MG EC tablet, Take 1 tablet by mouth Daily., Disp: 90 tablet, Rfl: 1      Immunization History   Administered Date(s) Administered    COVID-19 ([employer]) 04/05/2021    Flu Vaccine Quad PF >36MO 02/09/2018    Fluzone High-Dose 65+yrs 10/26/2022, 10/17/2023    Influenza, Unspecified 10/21/2019    Pneumococcal Conjugate 20-Valent (PCV20) 10/17/2023    Pneumococcal Polysaccharide (PPSV23) 08/12/2014         Review of Systems       Objective       Vitals:    10/17/23 1118   BP: 128/76   Pulse: 80   Temp: 97.5 °F (36.4 °C)   SpO2: 100%   Weight: 69.4 kg (153 lb)   Height: 156.2 cm (61.5\")     Body mass index is 28.44 kg/m².      Physical Exam  Vitals reviewed.   Constitutional:       Appearance: Normal appearance.   HENT:      Head: Normocephalic and atraumatic.      Nose: Nose normal.   Eyes:      Extraocular Movements: Extraocular movements intact.      Conjunctiva/sclera: Conjunctivae normal.   Cardiovascular:      Rate and Rhythm: Normal rate and regular rhythm.      Pulses: Normal pulses.      Heart sounds: Normal heart sounds.   Pulmonary:      Effort: Pulmonary effort is normal. No respiratory distress.      Breath sounds: Normal breath sounds.   Musculoskeletal:         General: Normal range of motion.   Skin:     General: Skin is warm and dry.   Neurological:      General: No focal deficit present.      Mental Status: She is alert and oriented to person, place, and time.      Cranial Nerves: No cranial nerve deficit.   Psychiatric:         Mood and Affect: Mood normal.         Behavior: Behavior normal.         Thought Content: Thought content normal.             Result Review :                           Assessment and Plan      Diagnoses and all orders for this " visit:    1. Hypothyroidism, unspecified type (Primary)  Comments:  Chronic, due for repeat labs.  Orders:  -     Comprehensive Metabolic Panel  -     CBC & Differential  -     TSH  -     Lipid Panel  -     T4, Free    2. Stage 3a chronic kidney disease  Comments:  Chronic, stable. due for repeat labs.    3. Immunization due  Comments:  Due for flu and pcv20. Administered in office and pt tolerated well.  Orders:  -     Fluzone High-Dose 65+yrs  -     Pneumococcal Conjugate Vaccine 20-Valent (PCV20)    4. Rheumatoid arthritis involving both hands with positive rheumatoid factor  Comments:  Chronic as of past few mos. Doing well on MTX. Following w/ rheumatology.                  Follow Up     No follow-ups on file.    Patient was given instructions and counseling regarding her condition or for health maintenance advice. Please see specific information pulled into the AVS if appropriate.     Wen Leblanc MD   Internal Medicine-Pediatrics

## 2023-12-27 ENCOUNTER — OFFICE VISIT (OUTPATIENT)
Dept: INTERNAL MEDICINE | Facility: CLINIC | Age: 78
End: 2023-12-27
Payer: MEDICARE

## 2023-12-27 VITALS
BODY MASS INDEX: 28.62 KG/M2 | HEIGHT: 61 IN | WEIGHT: 151.6 LBS | TEMPERATURE: 97.2 F | DIASTOLIC BLOOD PRESSURE: 82 MMHG | SYSTOLIC BLOOD PRESSURE: 142 MMHG | HEART RATE: 85 BPM | OXYGEN SATURATION: 99 %

## 2023-12-27 DIAGNOSIS — E78.5 HYPERLIPIDEMIA, UNSPECIFIED HYPERLIPIDEMIA TYPE: ICD-10-CM

## 2023-12-27 DIAGNOSIS — Z00.00 MEDICARE ANNUAL WELLNESS VISIT, SUBSEQUENT: Primary | ICD-10-CM

## 2023-12-27 DIAGNOSIS — E03.9 HYPOTHYROIDISM, UNSPECIFIED TYPE: ICD-10-CM

## 2023-12-27 DIAGNOSIS — Z76.0 MEDICATION REFILL: ICD-10-CM

## 2023-12-27 DIAGNOSIS — R03.0 ELEVATED BLOOD PRESSURE READING: ICD-10-CM

## 2023-12-27 PROCEDURE — 80053 COMPREHEN METABOLIC PANEL: CPT | Performed by: STUDENT IN AN ORGANIZED HEALTH CARE EDUCATION/TRAINING PROGRAM

## 2023-12-27 PROCEDURE — 84439 ASSAY OF FREE THYROXINE: CPT | Performed by: STUDENT IN AN ORGANIZED HEALTH CARE EDUCATION/TRAINING PROGRAM

## 2023-12-27 PROCEDURE — 84443 ASSAY THYROID STIM HORMONE: CPT | Performed by: STUDENT IN AN ORGANIZED HEALTH CARE EDUCATION/TRAINING PROGRAM

## 2023-12-27 PROCEDURE — 80061 LIPID PANEL: CPT | Performed by: STUDENT IN AN ORGANIZED HEALTH CARE EDUCATION/TRAINING PROGRAM

## 2023-12-27 RX ORDER — LEVOTHYROXINE SODIUM 0.05 MG/1
50 TABLET ORAL DAILY
Qty: 90 TABLET | Refills: 1 | Status: SHIPPED | OUTPATIENT
Start: 2023-12-27

## 2023-12-27 RX ORDER — PANTOPRAZOLE SODIUM 40 MG/1
40 TABLET, DELAYED RELEASE ORAL DAILY
Qty: 90 TABLET | Refills: 1 | Status: SHIPPED | OUTPATIENT
Start: 2023-12-27

## 2023-12-27 NOTE — PROGRESS NOTES
The ABCs of the Annual Wellness Visit  Subsequent Medicare Wellness Visit    Subjective    Nancie Grissom is a 78 y.o. female who presents for a Subsequent Medicare Wellness Visit.    The following portions of the patient's history were reviewed and   updated as appropriate: allergies, current medications, past family history, past medical history, past social history, past surgical history, and problem list.    Compared to one year ago, the patient feels her physical   health is the same.     Compared to one year ago, the patient feels her mental   health is the same.    Recent Hospitalizations:  She was not admitted to the hospital during the last year.       Current Medical Providers:  Patient Care Team:  Wen Leblanc MD as PCP - General (Internal Medicine)    Outpatient Medications Prior to Visit   Medication Sig Dispense Refill    amitriptyline (ELAVIL) 10 MG tablet Take 1 tablet by mouth Every Night. 90 tablet 1    atorvastatin (LIPITOR) 20 MG tablet Take 1 tablet by mouth Every Night. 90 tablet 1    Diclofenac Sodium (VOLTAREN) 1 % gel gel Apply  topically to the appropriate area as directed 4 (Four) Times a Day. 150 g 3    folic acid (FOLVITE) 1 MG tablet Take 1 tablet by mouth Daily.      hydroCHLOROthiazide (HYDRODIURIL) 25 MG tablet Take 1 tablet by mouth Daily. 90 tablet 1    lisinopril (PRINIVIL,ZESTRIL) 10 MG tablet Take 1 tablet by mouth Daily. 90 tablet 1    methotrexate 2.5 MG tablet Take 5 tablets by mouth 1 (One) Time Per Week.      levothyroxine (Synthroid) 50 MCG tablet Take 1 tablet by mouth Daily for 90 days. 90 tablet 1    pantoprazole (Protonix) 40 MG EC tablet Take 1 tablet by mouth Daily. 90 tablet 1     No facility-administered medications prior to visit.       No opioid medication identified on active medication list. I have reviewed chart for other potential  high risk medication/s and harmful drug interactions in the elderly.        Aspirin is not on active medication list.  Aspirin  "use is not indicated based on review of current medical condition/s. Risk of harm outweighs potential benefits.  .    Patient Active Problem List   Diagnosis    Depression    Esophageal reflux    Hyperlipidemia    Hypertension    Hypothyroidism    Osteoporosis    Vitamin D deficiency    Annual physical exam     Advance Care Planning   Advance Care Planning     Advance Directive is not on file.  ACP discussion was held with the patient during this visit. Patient does not have an advance directive, declines further assistance. Patient has had extensive discussions with her family, including her two sons. States she does not wish to live attached to machines.    Objective    Vitals:    23 1301   BP: 142/82   BP Location: Right arm   Patient Position: Sitting   Cuff Size: Adult   Pulse: 85   Temp: 97.2 °F (36.2 °C)   TempSrc: Temporal   SpO2: 99%   Weight: 68.8 kg (151 lb 9.6 oz)   Height: 156.2 cm (61.5\")     Estimated body mass index is 28.18 kg/m² as calculated from the following:    Height as of this encounter: 156.2 cm (61.5\").    Weight as of this encounter: 68.8 kg (151 lb 9.6 oz).           Does the patient have evidence of cognitive impairment? No    Lab Results   Component Value Date    TRIG 205 (H) 10/17/2023    HDL 41 10/17/2023     (H) 10/17/2023    VLDL 36 10/17/2023        HEALTH RISK ASSESSMENT    Smoking Status:  Social History     Tobacco Use   Smoking Status Never   Smokeless Tobacco Never     Alcohol Consumption:  Social History     Substance and Sexual Activity   Alcohol Use Yes    Comment: rarely     Fall Risk Screen:    JOSEADI Fall Risk Assessment was completed, and patient is at LOW risk for falls.Assessment completed on:2023    Depression Screenin/27/2023    12:57 PM   PHQ-2/PHQ-9 Depression Screening   Little Interest or Pleasure in Doing Things 0-->not at all   Feeling Down, Depressed or Hopeless 0-->not at all   PHQ-9: Brief Depression Severity Measure Score 0 "       Health Habits and Functional and Cognitive Screenin/27/2023    12:58 PM   Functional & Cognitive Status   Do you have difficulty preparing food and eating? No   Do you have difficulty bathing yourself, getting dressed or grooming yourself? No   Do you have difficulty using the toilet? No   Do you have difficulty moving around from place to place? No   Do you have trouble with steps or getting out of a bed or a chair? No   Current Diet Well Balanced Diet   Dental Exam Not up to date   Eye Exam Not up to date   Exercise (times per week) 5 times per week   Current Exercises Include House Cleaning;Walking   Do you need help using the phone?  No   Are you deaf or do you have serious difficulty hearing?  No   Do you need help to go to places out of walking distance? No   Do you need help shopping? No   Do you need help preparing meals?  No   Do you need help with housework?  No   Do you need help with laundry? No   Do you need help taking your medications? No   Do you need help managing money? No   Do you ever drive or ride in a car without wearing a seat belt? No   Have you felt unusual stress, anger or loneliness in the last month? No   Who do you live with? Alone   If you need help, do you have trouble finding someone available to you? No   Have you been bothered in the last four weeks by sexual problems? No   Do you have difficulty concentrating, remembering or making decisions? No       Age-appropriate Screening Schedule:  Refer to the list below for future screening recommendations based on patient's age, sex and/or medical conditions. Orders for these recommended tests are listed in the plan section. The patient has been provided with a written plan.    Health Maintenance   Topic Date Due    TDAP/TD VACCINES (1 - Tdap) Never done    ZOSTER VACCINE (1 of 2) Never done    COVID-19 Vaccine (2 -  season) 2023    BMI FOLLOWUP  03/10/2024    LIPID PANEL  10/17/2024    ANNUAL WELLNESS VISIT   "12/27/2024    DXA SCAN  03/21/2025    INFLUENZA VACCINE  Completed    Pneumococcal Vaccine 65+  Completed                  CMS Preventative Services Quick Reference  Risk Factors Identified During Encounter  Chronic Pain:  hand/finger pain. Following w/ rheumatology.   The above risks/problems have been discussed with the patient.  Pertinent information has been shared with the patient in the After Visit Summary.  An After Visit Summary and PPPS were made available to the patient.    Follow Up:   Next Medicare Wellness visit to be scheduled in 1 year.       Additional E&M Note during same encounter follows:  Patient has multiple medical problems which are significant and separately identifiable that require additional work above and beyond the Medicare Wellness Visit.      Chief Complaint  Medicare Wellness-subsequent    Subjective        HPI  Nancie Grissom is also being seen today for:     Elevated Bp in office today.          Objective   Vital Signs:  /82 (BP Location: Right arm, Patient Position: Sitting, Cuff Size: Adult)   Pulse 85   Temp 97.2 °F (36.2 °C) (Temporal)   Ht 156.2 cm (61.5\")   Wt 68.8 kg (151 lb 9.6 oz)   SpO2 99%   BMI 28.18 kg/m²     Physical Exam  Vitals reviewed.   Constitutional:       Appearance: Normal appearance.   HENT:      Head: Normocephalic and atraumatic.      Nose: Nose normal.   Eyes:      Extraocular Movements: Extraocular movements intact.      Conjunctiva/sclera: Conjunctivae normal.   Cardiovascular:      Rate and Rhythm: Normal rate and regular rhythm.      Pulses: Normal pulses.      Heart sounds: Normal heart sounds.   Pulmonary:      Effort: Pulmonary effort is normal. No respiratory distress.      Breath sounds: Normal breath sounds.   Musculoskeletal:         General: Normal range of motion.      Right lower leg: No edema.      Left lower leg: No edema.   Skin:     General: Skin is warm and dry.   Neurological:      General: No focal deficit present.      Mental " Status: She is alert and oriented to person, place, and time.      Cranial Nerves: No cranial nerve deficit.   Psychiatric:         Mood and Affect: Mood normal.         Behavior: Behavior normal.         Thought Content: Thought content normal.                         Assessment and Plan   Diagnoses and all orders for this visit:    1. Medicare annual wellness visit, subsequent (Primary)  Comments:  Pt is at baseline state of health. Acute and chronic needs also addressed today.    2. Elevated blood pressure reading  Comments:  mildly elevated in the office. States she's only had her thyroid medications today and has not had anything to eat as of yet. Also endorses stress w/ holiday    3. Hypothyroidism, unspecified type  Comments:  chronic, stable due for labs.  Orders:  -     TSH  -     T4, Free  -     levothyroxine (Synthroid) 50 MCG tablet; Take 1 tablet by mouth Daily.  Dispense: 90 tablet; Refill: 1  -     Comprehensive metabolic panel    4. Hyperlipidemia, unspecified hyperlipidemia type  Comments:  chronic, due for repeat labs. collected today.  Orders:  -     Lipid panel    5. Medication refill  Comments:  Refilled all meds.   Orders:  -     pantoprazole (Protonix) 40 MG EC tablet; Take 1 tablet by mouth Daily.  Dispense: 90 tablet; Refill: 1             Follow Up   Return in about 4 months (around 4/27/2024) for hypothyroidism..  Patient was given instructions and counseling regarding her condition or for health maintenance advice. Please see specific information pulled into the AVS if appropriate.

## 2023-12-28 LAB
ALBUMIN SERPL-MCNC: 4.6 G/DL (ref 3.5–5.2)
ALBUMIN/GLOB SERPL: 1.8 G/DL
ALP SERPL-CCNC: 68 U/L (ref 39–117)
ALT SERPL W P-5'-P-CCNC: 16 U/L (ref 1–33)
ANION GAP SERPL CALCULATED.3IONS-SCNC: 11 MMOL/L (ref 5–15)
AST SERPL-CCNC: 20 U/L (ref 1–32)
BILIRUB SERPL-MCNC: 0.5 MG/DL (ref 0–1.2)
BUN SERPL-MCNC: 12 MG/DL (ref 8–23)
BUN/CREAT SERPL: 11.2 (ref 7–25)
CALCIUM SPEC-SCNC: 9.7 MG/DL (ref 8.6–10.5)
CHLORIDE SERPL-SCNC: 106 MMOL/L (ref 98–107)
CHOLEST SERPL-MCNC: 169 MG/DL (ref 0–200)
CO2 SERPL-SCNC: 25 MMOL/L (ref 22–29)
CREAT SERPL-MCNC: 1.07 MG/DL (ref 0.57–1)
EGFRCR SERPLBLD CKD-EPI 2021: 52.6 ML/MIN/1.73
GLOBULIN UR ELPH-MCNC: 2.5 GM/DL
GLUCOSE SERPL-MCNC: 85 MG/DL (ref 65–99)
HDLC SERPL-MCNC: 42 MG/DL (ref 40–60)
LDLC SERPL CALC-MCNC: 99 MG/DL (ref 0–100)
LDLC/HDLC SERPL: 2.27 {RATIO}
POTASSIUM SERPL-SCNC: 4.5 MMOL/L (ref 3.5–5.2)
PROT SERPL-MCNC: 7.1 G/DL (ref 6–8.5)
SODIUM SERPL-SCNC: 142 MMOL/L (ref 136–145)
T4 FREE SERPL-MCNC: 1.48 NG/DL (ref 0.93–1.7)
TRIGL SERPL-MCNC: 158 MG/DL (ref 0–150)
TSH SERPL DL<=0.05 MIU/L-ACNC: 0.8 UIU/ML (ref 0.27–4.2)
VLDLC SERPL-MCNC: 28 MG/DL (ref 5–40)

## 2024-01-06 DIAGNOSIS — Z76.0 MEDICATION REFILL: ICD-10-CM

## 2024-01-07 DIAGNOSIS — Z76.0 MEDICATION REFILL: ICD-10-CM

## 2024-01-08 RX ORDER — LISINOPRIL 10 MG/1
10 TABLET ORAL DAILY
Qty: 90 TABLET | Refills: 1 | Status: SHIPPED | OUTPATIENT
Start: 2024-01-08

## 2024-01-08 RX ORDER — HYDROCHLOROTHIAZIDE 25 MG/1
25 TABLET ORAL DAILY
Qty: 90 TABLET | Refills: 1 | Status: SHIPPED | OUTPATIENT
Start: 2024-01-08

## 2024-01-10 DIAGNOSIS — Z76.0 MEDICATION REFILL: ICD-10-CM

## 2024-01-10 RX ORDER — AMITRIPTYLINE HYDROCHLORIDE 10 MG/1
10 TABLET, FILM COATED ORAL NIGHTLY
Qty: 90 TABLET | Refills: 1 | Status: SHIPPED | OUTPATIENT
Start: 2024-01-10

## 2024-01-10 NOTE — TELEPHONE ENCOUNTER
Caller: Nancie Grissom    Relationship: Self    Best call back number: [phone]     Requested Prescriptions:   Requested Prescriptions     Pending Prescriptions Disp Refills    amitriptyline (ELAVIL) 10 MG tablet 90 tablet 1     Sig: Take 1 tablet by mouth Every Night.        Pharmacy where request should be sent: [id number] - [place], [place] - [address] [facility] AT [facility] OF [address] & [place] - [phone] [facility] [phone] FX     Last office visit with prescribing clinician: 12/27/2023   Last telemedicine visit with prescribing clinician: Visit date not found   Next office visit with prescribing clinician: 4/30/2024     Additional details provided by patient: PATIENT STATES THAT SHE HAS 3 PILLS LEFT.     Does the patient have less than a 3 day supply:  [] Yes  [x] No    Would you like a call back once the refill request has been completed: [] Yes [] No    If the office needs to give you a call back, can they leave a voicemail: [] Yes [] No    Narciso Woody Rep   01/10/24 14:00 EST

## 2024-04-30 ENCOUNTER — OFFICE VISIT (OUTPATIENT)
Dept: INTERNAL MEDICINE | Facility: CLINIC | Age: 79
End: 2024-04-30
Payer: MEDICARE

## 2024-04-30 VITALS
DIASTOLIC BLOOD PRESSURE: 76 MMHG | HEART RATE: 79 BPM | OXYGEN SATURATION: 100 % | WEIGHT: 153.8 LBS | BODY MASS INDEX: 28.59 KG/M2 | SYSTOLIC BLOOD PRESSURE: 130 MMHG | TEMPERATURE: 96.6 F

## 2024-04-30 DIAGNOSIS — M05.742 RHEUMATOID ARTHRITIS INVOLVING BOTH HANDS WITH POSITIVE RHEUMATOID FACTOR: ICD-10-CM

## 2024-04-30 DIAGNOSIS — N18.31 STAGE 3A CHRONIC KIDNEY DISEASE: ICD-10-CM

## 2024-04-30 DIAGNOSIS — Z76.0 MEDICATION REFILL: ICD-10-CM

## 2024-04-30 DIAGNOSIS — G47.00 INSOMNIA, UNSPECIFIED TYPE: ICD-10-CM

## 2024-04-30 DIAGNOSIS — E03.9 HYPOTHYROIDISM, UNSPECIFIED TYPE: Primary | ICD-10-CM

## 2024-04-30 DIAGNOSIS — M05.741 RHEUMATOID ARTHRITIS INVOLVING BOTH HANDS WITH POSITIVE RHEUMATOID FACTOR: ICD-10-CM

## 2024-04-30 LAB
ALBUMIN SERPL-MCNC: 4.2 G/DL (ref 3.5–5.2)
ALBUMIN/GLOB SERPL: 1.4 G/DL
ALP SERPL-CCNC: 69 U/L (ref 39–117)
ALT SERPL W P-5'-P-CCNC: 12 U/L (ref 1–33)
ANION GAP SERPL CALCULATED.3IONS-SCNC: 12.8 MMOL/L (ref 5–15)
AST SERPL-CCNC: 19 U/L (ref 1–32)
BILIRUB SERPL-MCNC: 0.3 MG/DL (ref 0–1.2)
BUN SERPL-MCNC: 14 MG/DL (ref 8–23)
BUN/CREAT SERPL: 12.4 (ref 7–25)
CALCIUM SPEC-SCNC: 9.2 MG/DL (ref 8.6–10.5)
CHLORIDE SERPL-SCNC: 104 MMOL/L (ref 98–107)
CO2 SERPL-SCNC: 26.2 MMOL/L (ref 22–29)
CREAT SERPL-MCNC: 1.13 MG/DL (ref 0.57–1)
EGFRCR SERPLBLD CKD-EPI 2021: 49.3 ML/MIN/1.73
GLOBULIN UR ELPH-MCNC: 3 GM/DL
GLUCOSE SERPL-MCNC: 86 MG/DL (ref 65–99)
POTASSIUM SERPL-SCNC: 4.1 MMOL/L (ref 3.5–5.2)
PROT SERPL-MCNC: 7.2 G/DL (ref 6–8.5)
SODIUM SERPL-SCNC: 143 MMOL/L (ref 136–145)
T4 FREE SERPL-MCNC: 1.35 NG/DL (ref 0.93–1.7)
TSH SERPL DL<=0.05 MIU/L-ACNC: 1.17 UIU/ML (ref 0.27–4.2)

## 2024-04-30 PROCEDURE — 80053 COMPREHEN METABOLIC PANEL: CPT | Performed by: STUDENT IN AN ORGANIZED HEALTH CARE EDUCATION/TRAINING PROGRAM

## 2024-04-30 PROCEDURE — 3078F DIAST BP <80 MM HG: CPT | Performed by: STUDENT IN AN ORGANIZED HEALTH CARE EDUCATION/TRAINING PROGRAM

## 2024-04-30 PROCEDURE — 1159F MED LIST DOCD IN RCRD: CPT | Performed by: STUDENT IN AN ORGANIZED HEALTH CARE EDUCATION/TRAINING PROGRAM

## 2024-04-30 PROCEDURE — 84443 ASSAY THYROID STIM HORMONE: CPT | Performed by: STUDENT IN AN ORGANIZED HEALTH CARE EDUCATION/TRAINING PROGRAM

## 2024-04-30 PROCEDURE — 99214 OFFICE O/P EST MOD 30 MIN: CPT | Performed by: STUDENT IN AN ORGANIZED HEALTH CARE EDUCATION/TRAINING PROGRAM

## 2024-04-30 PROCEDURE — 84439 ASSAY OF FREE THYROXINE: CPT | Performed by: STUDENT IN AN ORGANIZED HEALTH CARE EDUCATION/TRAINING PROGRAM

## 2024-04-30 PROCEDURE — 1160F RVW MEDS BY RX/DR IN RCRD: CPT | Performed by: STUDENT IN AN ORGANIZED HEALTH CARE EDUCATION/TRAINING PROGRAM

## 2024-04-30 PROCEDURE — 3075F SYST BP GE 130 - 139MM HG: CPT | Performed by: STUDENT IN AN ORGANIZED HEALTH CARE EDUCATION/TRAINING PROGRAM

## 2024-04-30 RX ORDER — HYDROXYZINE HYDROCHLORIDE 25 MG/1
TABLET, FILM COATED ORAL
Qty: 60 TABLET | Refills: 1 | Status: SHIPPED | OUTPATIENT
Start: 2024-04-30 | End: 2024-05-01

## 2024-04-30 RX ORDER — PANTOPRAZOLE SODIUM 40 MG/1
40 TABLET, DELAYED RELEASE ORAL DAILY
Qty: 90 TABLET | Refills: 1 | Status: SHIPPED | OUTPATIENT
Start: 2024-04-30

## 2024-04-30 NOTE — PROGRESS NOTES
Chief Complaint  Hypothyroidism (Follow up)    Subjective            Nancie Grissom presents to [facility]  History of Present Illness    Heart burn:  Chronic, active. Requesting refill for protonix.     Hypothyroidism:  Chronic, stable.     RA:   states she is doing well on current regimen.  States she has very little pain now.   Sheis currently 5mg once week, and takes folic acid daily except on the days she takes the MTX.    She is following w/ rheumatology.     Past Medical History:   Diagnosis Date    Arthritis     Depression     GERD (gastroesophageal reflux disease) 02/12/2015    Hyperlipidemia     Hypertension     Hypothyroidism     Osteoporosis     Vitamin D deficiency        Allergies:   No Known Allergies       Past Surgical History:   Procedure Laterality Date    ROTATOR CUFF REPAIR Right           Social History     Socioeconomic History    Marital status:    Tobacco Use    Smoking status: Never    Smokeless tobacco: Never   Vaping Use    Vaping status: Never Used   Substance and Sexual Activity    Alcohol use: Yes     Comment: rarely    Drug use: Never    Sexual activity: Defer         History reviewed. No pertinent family history.       Health Maintenance Due   Topic Date Due    TDAP/TD VACCINES (1 - Tdap) Never done    ZOSTER VACCINE (1 of 2) Never done    RSV Vaccine - Adults (1 - 1-dose 60+ series) Never done    COVID-19 Vaccine (2 - 2023-24 season) 09/01/2023    BMI FOLLOWUP  03/10/2024            Current Outpatient Medications:     amitriptyline (ELAVIL) 10 MG tablet, Take 1 tablet by mouth Every Night., Disp: 90 tablet, Rfl: 1    atorvastatin (LIPITOR) 20 MG tablet, Take 1 tablet by mouth Every Night., Disp: 90 tablet, Rfl: 1    Diclofenac Sodium (VOLTAREN) 1 % gel gel, Apply  topically to the appropriate area as directed 4 (Four) Times a Day., Disp: 150 g, Rfl: 3    folic acid (FOLVITE) 1 MG tablet, Take 1 tablet by mouth Daily., Disp: , Rfl:      hydroCHLOROthiazide (HYDRODIURIL) 25 MG tablet, TAKE 1 TABLET BY MOUTH DAILY, Disp: 90 tablet, Rfl: 1    levothyroxine (Synthroid) 50 MCG tablet, Take 1 tablet by mouth Daily., Disp: 90 tablet, Rfl: 1    lisinopril (PRINIVIL,ZESTRIL) 10 MG tablet, TAKE 1 TABLET BY MOUTH DAILY, Disp: 90 tablet, Rfl: 1    methotrexate 2.5 MG tablet, Take 5 tablets by mouth 1 (One) Time Per Week., Disp: , Rfl:     pantoprazole (Protonix) 40 MG EC tablet, Take 1 tablet by mouth Daily., Disp: 90 tablet, Rfl: 1    hydrOXYzine (ATARAX) 25 MG tablet, Take 1 tablet nightly., Disp: 60 tablet, Rfl: 1      Immunization History   Administered Date(s) Administered    COVID-19 ([employer]) 04/05/2021    Flu Vaccine Quad PF >36MO 02/09/2018    Fluzone High-Dose 65+yrs 10/26/2022, 10/17/2023    Influenza, Unspecified 10/21/2019    Pneumococcal Conjugate 20-Valent (PCV20) 10/17/2023    Pneumococcal Polysaccharide (PPSV23) 08/12/2014         Review of Systems       Objective       Vitals:    04/30/24 1554   BP: 130/76   BP Location: Right arm   Patient Position: Sitting   Cuff Size: Adult   Pulse: 79   Temp: 96.6 °F (35.9 °C)   TempSrc: Temporal   SpO2: 100%   Weight: 69.8 kg (153 lb 12.8 oz)     Body mass index is 28.59 kg/m².      Physical Exam  Vitals reviewed.   Constitutional:       Appearance: Normal appearance.   HENT:      Head: Normocephalic and atraumatic.      Nose: Nose normal.   Eyes:      Extraocular Movements: Extraocular movements intact.      Conjunctiva/sclera: Conjunctivae normal.   Neck:      Thyroid: No thyroid mass, thyromegaly or thyroid tenderness.   Cardiovascular:      Rate and Rhythm: Normal rate and regular rhythm.      Pulses: Normal pulses.      Heart sounds: Normal heart sounds.   Pulmonary:      Effort: Pulmonary effort is normal. No respiratory distress.      Breath sounds: Normal breath sounds.   Musculoskeletal:         General: Normal range of motion.   Skin:     General: Skin is warm and dry.   Neurological:       General: No focal deficit present.      Mental Status: She is alert and oriented to person, place, and time.      Cranial Nerves: No cranial nerve deficit.   Psychiatric:         Mood and Affect: Mood normal.         Behavior: Behavior normal.         Thought Content: Thought content normal.             Result Review :                           Assessment and Plan      Diagnoses and all orders for this visit:    1. Hypothyroidism, unspecified type (Primary)  Comments:  chronic, pt doing well and is asymptomatic from a thyroid stanpoint. Due for labs which are collected today. Plan to repeat labs q6mos if wnl.  Orders:  -     pantoprazole (Protonix) 40 MG EC tablet; Take 1 tablet by mouth Daily.  Dispense: 90 tablet; Refill: 1  -     Diclofenac Sodium (VOLTAREN) 1 % gel gel; Apply  topically to the appropriate area as directed 4 (Four) Times a Day.  Dispense: 150 g; Refill: 3  -     TSH  -     T4, Free    2. Stage 3a chronic kidney disease  Comments:  Chronic, stable. Due for labs.  Orders:  -     Comprehensive metabolic panel    3. Insomnia, unspecified type  Comments:  Chronic, active. No improvement w/ melatonin. Starting hydroxyzine prn.  Orders:  -     hydrOXYzine (ATARAX) 25 MG tablet; Take 1 tablet nightly.  Dispense: 60 tablet; Refill: 1    4. Rheumatoid arthritis involving both hands with positive rheumatoid factor  Comments:  Chronic, following w/ rheumatology. Doing well on current regimen.    5. Medication refill  Comments:  Refilled all meds.   Orders:  -     pantoprazole (Protonix) 40 MG EC tablet; Take 1 tablet by mouth Daily.  Dispense: 90 tablet; Refill: 1  -     Diclofenac Sodium (VOLTAREN) 1 % gel gel; Apply  topically to the appropriate area as directed 4 (Four) Times a Day.  Dispense: 150 g; Refill: 3          Orders Placed This Encounter   Procedures    Comprehensive metabolic panel    TSH    T4, Free     New Medications Ordered This Visit   Medications    pantoprazole (Protonix) 40 MG EC tablet      Sig: Take 1 tablet by mouth Daily.     Dispense:  90 tablet     Refill:  1    Diclofenac Sodium (VOLTAREN) 1 % gel gel     Sig: Apply  topically to the appropriate area as directed 4 (Four) Times a Day.     Dispense:  150 g     Refill:  3    hydrOXYzine (ATARAX) 25 MG tablet     Sig: Take 1 tablet nightly.     Dispense:  60 tablet     Refill:  1             Follow Up     Return in about 6 months (around 10/30/2024) for f/u hypothyroidism.    Patient was given instructions and counseling regarding her condition or for health maintenance advice. Please see specific information pulled into the AVS if appropriate.     Wen Leblanc MD   Internal Medicine-Pediatrics

## 2024-05-01 DIAGNOSIS — G47.00 INSOMNIA, UNSPECIFIED TYPE: Primary | ICD-10-CM

## 2024-05-01 RX ORDER — HYDROXYZINE PAMOATE 25 MG/1
25 CAPSULE ORAL NIGHTLY
Qty: 60 CAPSULE | Refills: 1 | Status: SHIPPED | OUTPATIENT
Start: 2024-05-01

## 2024-05-03 ENCOUNTER — TELEPHONE (OUTPATIENT)
Dept: INTERNAL MEDICINE | Facility: CLINIC | Age: 79
End: 2024-05-03
Payer: MEDICARE

## 2024-05-03 NOTE — TELEPHONE ENCOUNTER
Spoke to pharmacist to clarify which hydroxyzine would be covered by insurance. Received copies of faxes saying capsules or tablets would not be covered. Pharmacist stated patient insurance will only take specific NDC that [facility] does not carry.

## 2024-05-16 DIAGNOSIS — G47.00 INSOMNIA, UNSPECIFIED TYPE: Primary | ICD-10-CM

## 2024-05-16 RX ORDER — HYDROXYZINE HYDROCHLORIDE 25 MG/1
25 TABLET, FILM COATED ORAL NIGHTLY PRN
Qty: 90 TABLET | Refills: 0 | Status: SHIPPED | OUTPATIENT
Start: 2024-05-16 | End: 2024-05-17

## 2024-05-17 DIAGNOSIS — G47.00 INSOMNIA, UNSPECIFIED TYPE: ICD-10-CM

## 2024-05-17 RX ORDER — HYDROXYZINE HYDROCHLORIDE 25 MG/1
25 TABLET, FILM COATED ORAL NIGHTLY
Qty: 90 TABLET | Refills: 1 | OUTPATIENT
Start: 2024-05-17

## 2024-07-10 DIAGNOSIS — E03.9 HYPOTHYROIDISM, UNSPECIFIED TYPE: ICD-10-CM

## 2024-07-10 DIAGNOSIS — Z76.0 MEDICATION REFILL: ICD-10-CM

## 2024-07-10 NOTE — TELEPHONE ENCOUNTER
Caller: Nancie Grissom    Relationship: Self    Best call back number: [phone]     Requested Prescriptions:   Requested Prescriptions     Pending Prescriptions Disp Refills    levothyroxine (Synthroid) 50 MCG tablet 90 tablet 1     Sig: Take 1 tablet by mouth Daily.    atorvastatin (LIPITOR) 20 MG tablet 90 tablet 1     Sig: Take 1 tablet by mouth Every Night.    hydroCHLOROthiazide 25 MG tablet 90 tablet 1     Sig: Take 1 tablet by mouth Daily.    lisinopril (PRINIVIL,ZESTRIL) 10 MG tablet 90 tablet 1     Sig: Take 1 tablet by mouth Daily.    amitriptyline (ELAVIL) 10 MG tablet 90 tablet 1     Sig: Take 1 tablet by mouth Every Night.    pantoprazole (Protonix) 40 MG EC tablet 90 tablet 1     Sig: Take 1 tablet by mouth Daily.        Pharmacy where request should be sent: [place] [phone] [place] [phone]      Last office visit with prescribing clinician: 4/30/2024   Last telemedicine visit with prescribing clinician: Visit date not found   Next office visit with prescribing clinician: 10/30/2024     Additional details provided by patient: 3 DAYS LEFT OF AMITRIPTYLINE AND HYDROCHLOROTHIAZIDE     Does the patient have less than a 3 day supply:  [x] Yes  [] No    Would you like a call back once the refill request has been completed: [] Yes [] No    If the office needs to give you a call back, can they leave a voicemail: [] Yes [] No    Narciso Pineda Rep   07/10/24 10:49 EDT

## 2024-07-11 RX ORDER — LISINOPRIL 10 MG/1
10 TABLET ORAL DAILY
Qty: 90 TABLET | Refills: 1 | Status: SHIPPED | OUTPATIENT
Start: 2024-07-11

## 2024-07-11 RX ORDER — HYDROCHLOROTHIAZIDE 25 MG/1
25 TABLET ORAL DAILY
Qty: 90 TABLET | Refills: 1 | Status: SHIPPED | OUTPATIENT
Start: 2024-07-11

## 2024-07-11 RX ORDER — LEVOTHYROXINE SODIUM 0.05 MG/1
50 TABLET ORAL DAILY
Qty: 90 TABLET | Refills: 1 | Status: SHIPPED | OUTPATIENT
Start: 2024-07-11

## 2024-07-11 RX ORDER — ATORVASTATIN CALCIUM 20 MG/1
20 TABLET, FILM COATED ORAL NIGHTLY
Qty: 90 TABLET | Refills: 1 | Status: SHIPPED | OUTPATIENT
Start: 2024-07-11

## 2024-07-11 RX ORDER — PANTOPRAZOLE SODIUM 40 MG/1
40 TABLET, DELAYED RELEASE ORAL DAILY
Qty: 90 TABLET | Refills: 1 | Status: SHIPPED | OUTPATIENT
Start: 2024-07-11

## 2024-07-11 RX ORDER — AMITRIPTYLINE HYDROCHLORIDE 10 MG/1
10 TABLET, FILM COATED ORAL NIGHTLY
Qty: 90 TABLET | Refills: 1 | Status: SHIPPED | OUTPATIENT
Start: 2024-07-11

## 2024-10-29 ENCOUNTER — TELEPHONE (OUTPATIENT)
Dept: INTERNAL MEDICINE | Facility: CLINIC | Age: 79
End: 2024-10-29
Payer: MEDICARE

## 2024-10-29 DIAGNOSIS — E03.9 HYPOTHYROIDISM, UNSPECIFIED TYPE: ICD-10-CM

## 2024-10-29 RX ORDER — LEVOTHYROXINE SODIUM 50 UG/1
50 TABLET ORAL DAILY
Qty: 90 TABLET | Refills: 1 | Status: SHIPPED | OUTPATIENT
Start: 2024-10-29

## 2024-10-29 NOTE — TELEPHONE ENCOUNTER
Caller: Nancie Grissom    Relationship: Self    Best call back number: [phone]     Requested Prescriptions:   Requested Prescriptions     Pending Prescriptions Disp Refills    levothyroxine (Synthroid) 50 MCG tablet 90 tablet 1     Sig: Take 1 tablet by mouth Daily.        Pharmacy where request should be sent: [address] [phone] [facility] [phone]      Last office visit with prescribing clinician: 4/30/2024   Last telemedicine visit with prescribing clinician: Visit date not found   Next office visit with prescribing clinician: 1/2/2025     Does the patient have less than a 3 day supply:  [x] Yes  [] No    Narciso Arciniega Rep   10/29/24 09:50 EDT

## 2025-01-01 ENCOUNTER — TELEPHONE (OUTPATIENT)
Dept: ONCOLOGY | Facility: HOSPITAL | Age: 80
End: 2025-01-01

## 2025-01-02 ENCOUNTER — OFFICE VISIT (OUTPATIENT)
Dept: INTERNAL MEDICINE | Facility: CLINIC | Age: 80
End: 2025-01-02
Payer: MEDICARE

## 2025-01-02 VITALS
HEART RATE: 83 BPM | TEMPERATURE: 96.4 F | WEIGHT: 152.2 LBS | OXYGEN SATURATION: 97 % | SYSTOLIC BLOOD PRESSURE: 130 MMHG | DIASTOLIC BLOOD PRESSURE: 78 MMHG | BODY MASS INDEX: 28.3 KG/M2

## 2025-01-02 DIAGNOSIS — G47.00 INSOMNIA, UNSPECIFIED TYPE: ICD-10-CM

## 2025-01-02 DIAGNOSIS — Z76.0 MEDICATION REFILL: ICD-10-CM

## 2025-01-02 DIAGNOSIS — E03.9 HYPOTHYROIDISM, UNSPECIFIED TYPE: ICD-10-CM

## 2025-01-02 DIAGNOSIS — Z00.00 MEDICARE ANNUAL WELLNESS VISIT, SUBSEQUENT: Primary | ICD-10-CM

## 2025-01-02 DIAGNOSIS — E78.5 HYPERLIPIDEMIA, UNSPECIFIED HYPERLIPIDEMIA TYPE: ICD-10-CM

## 2025-01-02 DIAGNOSIS — M05.741 RHEUMATOID ARTHRITIS INVOLVING BOTH HANDS WITH POSITIVE RHEUMATOID FACTOR: ICD-10-CM

## 2025-01-02 DIAGNOSIS — M05.742 RHEUMATOID ARTHRITIS INVOLVING BOTH HANDS WITH POSITIVE RHEUMATOID FACTOR: ICD-10-CM

## 2025-01-02 PROCEDURE — 90662 IIV NO PRSV INCREASED AG IM: CPT | Performed by: STUDENT IN AN ORGANIZED HEALTH CARE EDUCATION/TRAINING PROGRAM

## 2025-01-02 PROCEDURE — 3075F SYST BP GE 130 - 139MM HG: CPT | Performed by: STUDENT IN AN ORGANIZED HEALTH CARE EDUCATION/TRAINING PROGRAM

## 2025-01-02 PROCEDURE — G0008 ADMIN INFLUENZA VIRUS VAC: HCPCS | Performed by: STUDENT IN AN ORGANIZED HEALTH CARE EDUCATION/TRAINING PROGRAM

## 2025-01-02 PROCEDURE — G2211 COMPLEX E/M VISIT ADD ON: HCPCS | Performed by: STUDENT IN AN ORGANIZED HEALTH CARE EDUCATION/TRAINING PROGRAM

## 2025-01-02 PROCEDURE — 99214 OFFICE O/P EST MOD 30 MIN: CPT | Performed by: STUDENT IN AN ORGANIZED HEALTH CARE EDUCATION/TRAINING PROGRAM

## 2025-01-02 PROCEDURE — 3078F DIAST BP <80 MM HG: CPT | Performed by: STUDENT IN AN ORGANIZED HEALTH CARE EDUCATION/TRAINING PROGRAM

## 2025-01-02 PROCEDURE — 1126F AMNT PAIN NOTED NONE PRSNT: CPT | Performed by: STUDENT IN AN ORGANIZED HEALTH CARE EDUCATION/TRAINING PROGRAM

## 2025-01-02 PROCEDURE — G0439 PPPS, SUBSEQ VISIT: HCPCS | Performed by: STUDENT IN AN ORGANIZED HEALTH CARE EDUCATION/TRAINING PROGRAM

## 2025-01-02 RX ORDER — AMITRIPTYLINE HYDROCHLORIDE 10 MG/1
10 TABLET ORAL NIGHTLY
Qty: 90 TABLET | Refills: 1 | Status: SHIPPED | OUTPATIENT
Start: 2025-01-02

## 2025-01-02 RX ORDER — LEVOTHYROXINE SODIUM 50 UG/1
50 TABLET ORAL DAILY
Qty: 90 TABLET | Refills: 1 | Status: SHIPPED | OUTPATIENT
Start: 2025-01-02

## 2025-01-02 RX ORDER — PANTOPRAZOLE SODIUM 40 MG/1
40 TABLET, DELAYED RELEASE ORAL DAILY
Qty: 90 TABLET | Refills: 1 | Status: SHIPPED | OUTPATIENT
Start: 2025-01-02

## 2025-01-02 RX ORDER — LISINOPRIL 10 MG/1
10 TABLET ORAL DAILY
Qty: 90 TABLET | Refills: 1 | Status: SHIPPED | OUTPATIENT
Start: 2025-01-02

## 2025-01-02 RX ORDER — ATORVASTATIN CALCIUM 20 MG/1
20 TABLET, FILM COATED ORAL NIGHTLY
Qty: 90 TABLET | Refills: 1 | Status: SHIPPED | OUTPATIENT
Start: 2025-01-02

## 2025-01-02 RX ORDER — HYDROCHLOROTHIAZIDE 25 MG/1
25 TABLET ORAL DAILY
Qty: 90 TABLET | Refills: 1 | Status: SHIPPED | OUTPATIENT
Start: 2025-01-02

## 2025-01-02 RX ORDER — TRAZODONE HYDROCHLORIDE 50 MG/1
25 TABLET, FILM COATED ORAL NIGHTLY
Qty: 90 TABLET | Refills: 1 | Status: SHIPPED | OUTPATIENT
Start: 2025-01-02

## 2025-01-02 NOTE — PROGRESS NOTES
Subjective   The ABCs of the Annual Wellness Visit  Medicare Wellness Visit      Nancie Grissom is a 79 y.o. patient who presents for a Medicare Wellness Visit.    The following portions of the patient's history were reviewed and   updated as appropriate: allergies, current medications, past family history, past medical history, past social history, past surgical history, and problem list.    Compared to one year ago, the patient's physical   health is the same.  Compared to one year ago, the patient's mental   health is the same.    Recent Hospitalizations:  She was not admitted to the hospital during the last year.     Current Medical Providers:  Patient Care Team:  Wen Leblanc MD as PCP - General (Internal Medicine)    Outpatient Medications Prior to Visit   Medication Sig Dispense Refill    amitriptyline (ELAVIL) 10 MG tablet Take 1 tablet by mouth Every Night. 90 tablet 1    atorvastatin (LIPITOR) 20 MG tablet Take 1 tablet by mouth Every Night. 90 tablet 1    Diclofenac Sodium (VOLTAREN) 1 % gel gel Apply  topically to the appropriate area as directed 4 (Four) Times a Day. 150 g 3    hydroCHLOROthiazide 25 MG tablet Take 1 tablet by mouth Daily. 90 tablet 1    levothyroxine (Synthroid) 50 MCG tablet Take 1 tablet by mouth Daily. 90 tablet 1    lisinopril (PRINIVIL,ZESTRIL) 10 MG tablet Take 1 tablet by mouth Daily. 90 tablet 1    pantoprazole (Protonix) 40 MG EC tablet Take 1 tablet by mouth Daily. 90 tablet 1    folic acid (FOLVITE) 1 MG tablet Take 1 tablet by mouth Daily. (Patient not taking: Reported on 1/2/2025)      hydrOXYzine (ATARAX) 25 MG tablet Take 1 tablet by mouth Every Night. (Patient not taking: Reported on 1/2/2025) 90 tablet 1    methotrexate 2.5 MG tablet Take 5 tablets by mouth 1 (One) Time Per Week. (Patient not taking: Reported on 1/2/2025)       No facility-administered medications prior to visit.     No opioid medication identified on active medication list. I have reviewed chart  "for other potential  high risk medication/s and harmful drug interactions in the elderly.      Aspirin is not on active medication list.  Aspirin use is indicated based on review of current medical condition/s. Pros and cons of this therapy have been discussed with this patient. Benefits of this medication outweigh potential harm.  Patient has been instructed to start taking this medication..    Patient Active Problem List   Diagnosis    Depression    Esophageal reflux    Hyperlipidemia    Hypertension    Hypothyroidism    Osteoporosis    Vitamin D deficiency    Annual physical exam    Rheumatoid arthritis involving both hands with positive rheumatoid factor    Insomnia    Stage 3a chronic kidney disease     Advance Care Planning Advance Directive is not on file.  ACP discussion was held with the patient during this visit. Patient has an advance directive (not in EMR), copy requested.            Objective   Vitals:    01/02/25 1422   BP: 130/78   BP Location: Right arm   Patient Position: Sitting   Cuff Size: Adult   Pulse: 83   Temp: 96.4 °F (35.8 °C)   TempSrc: Temporal   SpO2: 97%   Weight: 69 kg (152 lb 3.2 oz)   PainSc: 0-No pain       Estimated body mass index is 28.3 kg/m² as calculated from the following:    Height as of 12/27/23: 156.2 cm (61.5\").    Weight as of this encounter: 69 kg (152 lb 3.2 oz).    BMI is >= 25 and <30. (Overweight) The following options were offered after discussion;: none (medical contraindication)           Does the patient have evidence of cognitive impairment? No                                                                                                Health  Risk Assessment    Smoking Status:  Social History     Tobacco Use   Smoking Status Never   Smokeless Tobacco Never     Alcohol Consumption:  Social History     Substance and Sexual Activity   Alcohol Use Yes    Comment: rarely       Fall Risk Screen  STEADI Fall Risk Assessment was completed, and patient is at LOW risk " for falls.Assessment completed on:2025    Depression Screening   Little interest or pleasure in doing things? Not at all   Feeling down, depressed, or hopeless? Not at all   PHQ-2 Total Score 0      Health Habits and Functional and Cognitive Screenin/2/2025     2:00 PM   Functional & Cognitive Status   Do you have difficulty preparing food and eating? No   Do you have difficulty bathing yourself, getting dressed or grooming yourself? No   Do you have difficulty using the toilet? No   Do you have difficulty moving around from place to place? No   Do you have trouble with steps or getting out of a bed or a chair? No   Current Diet Well Balanced Diet   Dental Exam Not up to date   Eye Exam Not up to date   Exercise (times per week) 0 times per week   Current Exercises Include No Regular Exercise   Do you need help using the phone?  No   Are you deaf or do you have serious difficulty hearing?  No   Do you need help to go to places out of walking distance? No   Do you need help shopping? No   Do you need help preparing meals?  No   Do you need help with housework?  No   Do you need help with laundry? No   Do you need help taking your medications? No   Do you need help managing money? No   Do you ever drive or ride in a car without wearing a seat belt? No   Have you felt unusual stress, anger or loneliness in the last month? No   Who do you live with? Alone   If you need help, do you have trouble finding someone available to you? No   Have you been bothered in the last four weeks by sexual problems? No   Do you have difficulty concentrating, remembering or making decisions? No           Age-appropriate Screening Schedule:  Refer to the list below for future screening recommendations based on patient's age, sex and/or medical conditions. Orders for these recommended tests are listed in the plan section. The patient has been provided with a written plan.    Health Maintenance List  Health Maintenance   Topic  Date Due    TDAP/TD VACCINES (1 - Tdap) Never done    ZOSTER VACCINE (1 of 2) Never done    RSV Vaccine - Adults (1 - 1-dose 75+ series) Never done    COVID-19 Vaccine (2 - 2024-25 season) 09/01/2024    LIPID PANEL  12/27/2024    DXA SCAN  03/21/2025    ANNUAL WELLNESS VISIT  01/02/2026    INFLUENZA VACCINE  Completed    Pneumococcal Vaccine 65+  Completed                                                                                                                                                CMS Preventative Services Quick Reference  Risk Factors Identified During Encounter  None Identified    The above risks/problems have been discussed with the patient.  Pertinent information has been shared with the patient in the After Visit Summary.  An After Visit Summary and PPPS were made available to the patient.    Follow Up:   Next Medicare Wellness visit to be scheduled in 1 year.         Additional E&M Note during same encounter follows:  Patient has additional, significant, and separately identifiable condition(s)/problem(s) that require work above and beyond the Medicare Wellness Visit     Chief Complaint  Medicare Wellness-subsequent    Subjective   HPI  Nancie is also being seen today for additional medical problem/s.          Hypothyroidism:  Hyperlipidemia:  Chronic, presenting fr f/u and labs.     Insomnia:  Chronic, active.   She does have melatonin which she takes sometimes and not consistently.     RA:   Chronic, stable. Following with rheumatology.         Objective   Vital Signs:  /78 (BP Location: Right arm, Patient Position: Sitting, Cuff Size: Adult)   Pulse 83   Temp 96.4 °F (35.8 °C) (Temporal)   Wt 69 kg (152 lb 3.2 oz)   SpO2 97%   BMI 28.30 kg/m²   Physical Exam  Vitals reviewed.   Constitutional:       Appearance: Normal appearance.   HENT:      Head: Normocephalic and atraumatic.      Nose: Nose normal.   Eyes:      Extraocular Movements: Extraocular movements intact.       Conjunctiva/sclera: Conjunctivae normal.   Cardiovascular:      Rate and Rhythm: Normal rate and regular rhythm.      Pulses: Normal pulses.      Heart sounds: Normal heart sounds.   Pulmonary:      Effort: Pulmonary effort is normal. No respiratory distress.      Breath sounds: Normal breath sounds.   Musculoskeletal:         General: Normal range of motion.   Skin:     General: Skin is warm and dry.   Neurological:      General: No focal deficit present.      Mental Status: She is alert and oriented to person, place, and time.      Cranial Nerves: No cranial nerve deficit.   Psychiatric:         Mood and Affect: Mood normal.         Behavior: Behavior normal.         Thought Content: Thought content normal.                       Assessment and Plan   .   Diagnoses and all orders for this visit:    1. Medicare annual wellness visit, subsequent (Primary)  Comments:  at baseline state of health. Other chronic needs also addressed below.    2. Hypothyroidism, unspecified type  Comments:  chronic, pt doing well and is asymptomatic from a thyroid stanpoint. Overdue for labs.  Orders:  -     Comprehensive Metabolic Panel  -     CBC & Differential  -     TSH  -     Lipid Panel  -     T4, Free    3. Hyperlipidemia, unspecified hyperlipidemia type  Comments:  Chronic, due for labs. On statin and tolerating well.    4. Insomnia, unspecified type  Comments:  chronic, discussed importance of sleep hygiene, recommend melatonin. Starting trazodone.  Orders:  -     traZODone (DESYREL) 50 MG tablet; Take 0.5 tablets by mouth Every Night.  Dispense: 90 tablet; Refill: 1    5. Rheumatoid arthritis involving both hands with positive rheumatoid factor  Comments:  chronic, stable. following w/ rheumatology. off MTX.    6. Medication refill  Comments:  Refilled all meds.   Orders:  -     amitriptyline (ELAVIL) 10 MG tablet; Take 1 tablet by mouth Every Night.  Dispense: 90 tablet; Refill: 1  -     atorvastatin (LIPITOR) 20 MG tablet;  Take 1 tablet by mouth Every Night.  Dispense: 90 tablet; Refill: 1  -     Diclofenac Sodium (VOLTAREN) 1 % gel gel; Apply  topically to the appropriate area as directed 4 (Four) Times a Day.  Dispense: 150 g; Refill: 3  -     hydroCHLOROthiazide 25 MG tablet; Take 1 tablet by mouth Daily.  Dispense: 90 tablet; Refill: 1  -     levothyroxine (Synthroid) 50 MCG tablet; Take 1 tablet by mouth Daily.  Dispense: 90 tablet; Refill: 1  -     lisinopril (PRINIVIL,ZESTRIL) 10 MG tablet; Take 1 tablet by mouth Daily.  Dispense: 90 tablet; Refill: 1  -     pantoprazole (Protonix) 40 MG EC tablet; Take 1 tablet by mouth Daily.  Dispense: 90 tablet; Refill: 1    Other orders  -     Fluzone High-Dose 65+yrs                Follow Up   Return in about 6 weeks (around 2/13/2025) for insomnia .  Patient was given instructions and counseling regarding her condition or for health maintenance advice. Please see specific information pulled into the AVS if appropriate.

## 2025-01-02 NOTE — PATIENT INSTRUCTIONS
Insomnia:   Chronic, starting trazodone.   Take 1/2 tablet nightly, ok to increase to 1 full tablet nightly after 1 week if no change in sleep hours.

## 2025-01-28 NOTE — ASSESSMENT & PLAN NOTE
Patient currently taking atorvastatin 20 mg daily, we will check labs today, none checked since 2021.  Will adjust and address as needed based on results.   2+ bilaterally, no bruit/right normal/left normal

## 2025-02-03 ENCOUNTER — CLINICAL SUPPORT (OUTPATIENT)
Dept: INTERNAL MEDICINE | Facility: CLINIC | Age: 80
End: 2025-02-03
Payer: MEDICARE

## 2025-02-03 DIAGNOSIS — N18.31 STAGE 3A CHRONIC KIDNEY DISEASE: ICD-10-CM

## 2025-02-03 DIAGNOSIS — E78.5 HYPERLIPIDEMIA, UNSPECIFIED HYPERLIPIDEMIA TYPE: ICD-10-CM

## 2025-02-03 DIAGNOSIS — E55.9 VITAMIN D DEFICIENCY: ICD-10-CM

## 2025-02-03 DIAGNOSIS — Z00.00 ANNUAL PHYSICAL EXAM: Primary | ICD-10-CM

## 2025-02-03 LAB
ALBUMIN SERPL-MCNC: 4.1 G/DL (ref 3.5–5.2)
ALBUMIN/GLOB SERPL: 1.1 G/DL
ALP SERPL-CCNC: 75 U/L (ref 39–117)
ALT SERPL W P-5'-P-CCNC: 11 U/L (ref 1–33)
ANION GAP SERPL CALCULATED.3IONS-SCNC: 13.1 MMOL/L (ref 5–15)
AST SERPL-CCNC: 18 U/L (ref 1–32)
BASOPHILS # BLD AUTO: 0.06 10*3/MM3 (ref 0–0.2)
BASOPHILS NFR BLD AUTO: 0.7 % (ref 0–1.5)
BILIRUB SERPL-MCNC: 0.5 MG/DL (ref 0–1.2)
BUN SERPL-MCNC: 18 MG/DL (ref 8–23)
BUN/CREAT SERPL: 14.5 (ref 7–25)
CALCIUM SPEC-SCNC: 9.3 MG/DL (ref 8.6–10.5)
CHLORIDE SERPL-SCNC: 103 MMOL/L (ref 98–107)
CHOLEST SERPL-MCNC: 155 MG/DL (ref 0–200)
CO2 SERPL-SCNC: 23.9 MMOL/L (ref 22–29)
CREAT SERPL-MCNC: 1.24 MG/DL (ref 0.57–1)
DEPRECATED RDW RBC AUTO: 43.5 FL (ref 37–54)
EGFRCR SERPLBLD CKD-EPI 2021: 43.8 ML/MIN/1.73
EOSINOPHIL # BLD AUTO: 0.13 10*3/MM3 (ref 0–0.4)
EOSINOPHIL NFR BLD AUTO: 1.5 % (ref 0.3–6.2)
ERYTHROCYTE [DISTWIDTH] IN BLOOD BY AUTOMATED COUNT: 13 % (ref 12.3–15.4)
GLOBULIN UR ELPH-MCNC: 3.7 GM/DL
GLUCOSE SERPL-MCNC: 91 MG/DL (ref 65–99)
HCT VFR BLD AUTO: 43.6 % (ref 34–46.6)
HDLC SERPL-MCNC: 42 MG/DL (ref 40–60)
HGB BLD-MCNC: 14.7 G/DL (ref 12–15.9)
IMM GRANULOCYTES # BLD AUTO: 0.03 10*3/MM3 (ref 0–0.05)
IMM GRANULOCYTES NFR BLD AUTO: 0.3 % (ref 0–0.5)
LDLC SERPL CALC-MCNC: 87 MG/DL (ref 0–100)
LDLC/HDLC SERPL: 1.97 {RATIO}
LYMPHOCYTES # BLD AUTO: 1.44 10*3/MM3 (ref 0.7–3.1)
LYMPHOCYTES NFR BLD AUTO: 16.3 % (ref 19.6–45.3)
MCH RBC QN AUTO: 30.8 PG (ref 26.6–33)
MCHC RBC AUTO-ENTMCNC: 33.7 G/DL (ref 31.5–35.7)
MCV RBC AUTO: 91.2 FL (ref 79–97)
MONOCYTES # BLD AUTO: 0.48 10*3/MM3 (ref 0.1–0.9)
MONOCYTES NFR BLD AUTO: 5.4 % (ref 5–12)
NEUTROPHILS NFR BLD AUTO: 6.7 10*3/MM3 (ref 1.7–7)
NEUTROPHILS NFR BLD AUTO: 75.8 % (ref 42.7–76)
NRBC BLD AUTO-RTO: 0 /100 WBC (ref 0–0.2)
PLATELET # BLD AUTO: 222 10*3/MM3 (ref 140–450)
PMV BLD AUTO: 11.3 FL (ref 6–12)
POTASSIUM SERPL-SCNC: 4.1 MMOL/L (ref 3.5–5.2)
PROT SERPL-MCNC: 7.8 G/DL (ref 6–8.5)
RBC # BLD AUTO: 4.78 10*6/MM3 (ref 3.77–5.28)
SODIUM SERPL-SCNC: 140 MMOL/L (ref 136–145)
T4 FREE SERPL-MCNC: 1.26 NG/DL (ref 0.92–1.68)
TRIGL SERPL-MCNC: 151 MG/DL (ref 0–150)
TSH SERPL DL<=0.05 MIU/L-ACNC: 1.78 UIU/ML (ref 0.27–4.2)
VLDLC SERPL-MCNC: 26 MG/DL (ref 5–40)
WBC NRBC COR # BLD AUTO: 8.84 10*3/MM3 (ref 3.4–10.8)

## 2025-02-03 PROCEDURE — 80061 LIPID PANEL: CPT | Performed by: STUDENT IN AN ORGANIZED HEALTH CARE EDUCATION/TRAINING PROGRAM

## 2025-02-03 PROCEDURE — 85025 COMPLETE CBC W/AUTO DIFF WBC: CPT | Performed by: STUDENT IN AN ORGANIZED HEALTH CARE EDUCATION/TRAINING PROGRAM

## 2025-02-03 PROCEDURE — 80053 COMPREHEN METABOLIC PANEL: CPT | Performed by: STUDENT IN AN ORGANIZED HEALTH CARE EDUCATION/TRAINING PROGRAM

## 2025-02-03 PROCEDURE — 36415 COLL VENOUS BLD VENIPUNCTURE: CPT | Performed by: STUDENT IN AN ORGANIZED HEALTH CARE EDUCATION/TRAINING PROGRAM

## 2025-02-03 PROCEDURE — 84443 ASSAY THYROID STIM HORMONE: CPT | Performed by: STUDENT IN AN ORGANIZED HEALTH CARE EDUCATION/TRAINING PROGRAM

## 2025-02-03 PROCEDURE — 84439 ASSAY OF FREE THYROXINE: CPT | Performed by: STUDENT IN AN ORGANIZED HEALTH CARE EDUCATION/TRAINING PROGRAM

## 2025-02-03 NOTE — PROGRESS NOTES
Venipuncture Blood Specimen Collection  Venipuncture performed in [facility] by Velvet Payne RN with good hemostasis. Patient tolerated the procedure well without complications.   02/03/25   Velvet Payne RN

## 2025-02-13 ENCOUNTER — OFFICE VISIT (OUTPATIENT)
Dept: INTERNAL MEDICINE | Facility: CLINIC | Age: 80
End: 2025-02-13
Payer: MEDICARE

## 2025-02-13 VITALS
HEIGHT: 62 IN | BODY MASS INDEX: 28.39 KG/M2 | DIASTOLIC BLOOD PRESSURE: 84 MMHG | SYSTOLIC BLOOD PRESSURE: 122 MMHG | TEMPERATURE: 96.4 F | HEART RATE: 99 BPM | OXYGEN SATURATION: 97 % | WEIGHT: 154.25 LBS

## 2025-02-13 DIAGNOSIS — E78.5 HYPERLIPIDEMIA, UNSPECIFIED HYPERLIPIDEMIA TYPE: ICD-10-CM

## 2025-02-13 DIAGNOSIS — G47.00 INSOMNIA, UNSPECIFIED TYPE: Primary | ICD-10-CM

## 2025-02-13 DIAGNOSIS — M85.80 OSTEOPENIA, UNSPECIFIED LOCATION: ICD-10-CM

## 2025-02-13 DIAGNOSIS — N18.31 STAGE 3A CHRONIC KIDNEY DISEASE: ICD-10-CM

## 2025-02-13 DIAGNOSIS — Z78.0 POST-MENOPAUSAL: ICD-10-CM

## 2025-02-13 DIAGNOSIS — I10 PRIMARY HYPERTENSION: ICD-10-CM

## 2025-02-13 DIAGNOSIS — M05.741 RHEUMATOID ARTHRITIS INVOLVING BOTH HANDS WITH POSITIVE RHEUMATOID FACTOR: ICD-10-CM

## 2025-02-13 DIAGNOSIS — M05.742 RHEUMATOID ARTHRITIS INVOLVING BOTH HANDS WITH POSITIVE RHEUMATOID FACTOR: ICD-10-CM

## 2025-02-13 PROCEDURE — 1160F RVW MEDS BY RX/DR IN RCRD: CPT | Performed by: STUDENT IN AN ORGANIZED HEALTH CARE EDUCATION/TRAINING PROGRAM

## 2025-02-13 PROCEDURE — 1159F MED LIST DOCD IN RCRD: CPT | Performed by: STUDENT IN AN ORGANIZED HEALTH CARE EDUCATION/TRAINING PROGRAM

## 2025-02-13 PROCEDURE — 1126F AMNT PAIN NOTED NONE PRSNT: CPT | Performed by: STUDENT IN AN ORGANIZED HEALTH CARE EDUCATION/TRAINING PROGRAM

## 2025-02-13 PROCEDURE — 3074F SYST BP LT 130 MM HG: CPT | Performed by: STUDENT IN AN ORGANIZED HEALTH CARE EDUCATION/TRAINING PROGRAM

## 2025-02-13 PROCEDURE — 3079F DIAST BP 80-89 MM HG: CPT | Performed by: STUDENT IN AN ORGANIZED HEALTH CARE EDUCATION/TRAINING PROGRAM

## 2025-02-13 PROCEDURE — 99214 OFFICE O/P EST MOD 30 MIN: CPT | Performed by: STUDENT IN AN ORGANIZED HEALTH CARE EDUCATION/TRAINING PROGRAM

## 2025-02-13 NOTE — PROGRESS NOTES
Chief Complaint  Hypothyroidism (Lab results), Hypertension, and Hyperlipidemia    Subjective            Nancie Grissom presents to [facility]  Hypothyroidism  Her past medical history is significant for hyperlipidemia.   Hypertension    Hyperlipidemia  Exacerbating diseases include hypothyroidism.       History of Present Illness  The patient is an 79-year-old female who came in for a follow-up on her sleep issues, arthritis, chronic kidney disease, and overall health maintenance.    Sleep Issues  - Trazodone has helped her sleep a little, but not significantly.    Arthritis  - Stopped taking methotrexate because it caused her hair to fall out in clumps.  - Looking for other ways to manage her arthritis.  - Using over-the-counter Tylenol for pain relief.  - Last saw her rheumatologist, Dr. Bautista, on December 30, 2024.  - Discussed other treatment options like hydroxychloroquine and leflunomide, which she wanted to avoid.  - Symptoms are getting worse, but she hasn't sought further medical advice.  - Finds it hard to do some tasks now because of her condition.  - Has a high tolerance for pain and hasn't had any falls.    Chronic Kidney Disease  - Wasn't aware of her chronic kidney disease diagnosis.  - Hasn't seen a kidney specialist.  - Urinates regularly and her urine is light in color in the mornings.    Supplemental Information  - Wants to get a flu shot today.  - Tries to eat well but had a lot of [place] food during [place].  - Doesn't usually eat like that and used to eat a lot of chocolate but doesn't anymore.  - Eats a lot of fruits and vegetables.    FAMILY HISTORY  Her mother had osteopenia. Her sister has osteopenia.    MEDICATIONS  Current: Trazodone, Tylenol.  Discontinued: Methotrexate.        Past Medical History:   Diagnosis Date    Arthritis     Depression     GERD (gastroesophageal reflux disease) 02/12/2015    Hyperlipidemia     Hypertension      Hypothyroidism     Osteoporosis     Vitamin D deficiency        Allergies:   No Known Allergies       Past Surgical History:   Procedure Laterality Date    ROTATOR CUFF REPAIR Right           Social History     Socioeconomic History    Marital status:    Tobacco Use    Smoking status: Never    Smokeless tobacco: Never   Vaping Use    Vaping status: Never Used   Substance and Sexual Activity    Alcohol use: Yes     Comment: rarely    Drug use: Never    Sexual activity: Defer         No family history on file.       Health Maintenance Due   Topic Date Due    TDAP/TD VACCINES (1 - Tdap) Never done    ZOSTER VACCINE (1 of 2) Never done    RSV Vaccine - Adults (1 - 1-dose 75+ series) Never done    COVID-19 Vaccine (2 - 2024-25 season) 09/01/2024    DXA SCAN  03/21/2025            Current Outpatient Medications:     amitriptyline (ELAVIL) 10 MG tablet, Take 1 tablet by mouth Every Night., Disp: 90 tablet, Rfl: 1    atorvastatin (LIPITOR) 20 MG tablet, Take 1 tablet by mouth Every Night., Disp: 90 tablet, Rfl: 1    Diclofenac Sodium (VOLTAREN) 1 % gel gel, Apply  topically to the appropriate area as directed 4 (Four) Times a Day., Disp: 150 g, Rfl: 3    hydroCHLOROthiazide 25 MG tablet, Take 1 tablet by mouth Daily., Disp: 90 tablet, Rfl: 1    levothyroxine (Synthroid) 50 MCG tablet, Take 1 tablet by mouth Daily., Disp: 90 tablet, Rfl: 1    lisinopril (PRINIVIL,ZESTRIL) 10 MG tablet, Take 1 tablet by mouth Daily., Disp: 90 tablet, Rfl: 1    pantoprazole (Protonix) 40 MG EC tablet, Take 1 tablet by mouth Daily., Disp: 90 tablet, Rfl: 1    traZODone (DESYREL) 50 MG tablet, Take 0.5 tablets by mouth Every Night., Disp: 90 tablet, Rfl: 1      Immunization History   Administered Date(s) Administered    COVID-19 ([employer]) 04/05/2021    Flu Vaccine Quad PF >36MO 02/09/2018    Fluzone High-Dose 65+YRS 01/02/2025    Fluzone High-Dose 65+yrs 10/26/2022, 10/17/2023    Influenza, Unspecified 10/21/2019    Pneumococcal Conjugate  "20-Valent (PCV20) 10/17/2023    Pneumococcal Polysaccharide (PPSV23) 08/12/2014         Review of Systems       Objective       Vitals:    02/13/25 1612   BP: 122/84   BP Location: Left arm   Patient Position: Sitting   Pulse: 99   Temp: 96.4 °F (35.8 °C)   TempSrc: Temporal   SpO2: 97%   Weight: 70 kg (154 lb 4 oz)   Height: 156.2 cm (61.5\")     Body mass index is 28.67 kg/m².      Physical Exam  Vitals reviewed.   Constitutional:       Appearance: Normal appearance.   HENT:      Head: Normocephalic and atraumatic.      Nose: Nose normal.   Eyes:      Extraocular Movements: Extraocular movements intact.      Conjunctiva/sclera: Conjunctivae normal.   Cardiovascular:      Rate and Rhythm: Normal rate and regular rhythm.      Pulses: Normal pulses.      Heart sounds: Murmur heard.   Pulmonary:      Effort: Pulmonary effort is normal. No respiratory distress.      Breath sounds: Normal breath sounds.   Musculoskeletal:         General: Swelling (over the right wrist, and2nd  MTP of bilateral hands) present. Normal range of motion.   Skin:     General: Skin is warm and dry.   Neurological:      General: No focal deficit present.      Mental Status: She is alert and oriented to person, place, and time.      Cranial Nerves: No cranial nerve deficit.   Psychiatric:         Mood and Affect: Mood normal.         Behavior: Behavior normal.         Thought Content: Thought content normal.         Physical Exam  Lungs are clear.  Heart has a normal rate and rhythm with a stable murmur.  There is swelling on the medial aspect of the patient's wrist and both index fingers. The right side is worse than the left.           Result Review :     Results  Laboratory Studies  Kidney labs indicate chronic kidney disease, stage 3a. Liver labs are normal. Blood sugar levels are normal. Blood count is normal with no signs of anemia. White blood cell counts are normal. Thyroid labs are normal. Triglycerides are 151. LDL cholesterol is " normal.       The following data was reviewed by: Wen Leblanc MD on 02/13/2025:    Common labs          4/30/2024    16:39 2/3/2025    14:40   Common Labs   Glucose 86  91    BUN 14  18    Creatinine 1.13  1.24    Sodium 143  140    Potassium 4.1  4.1    Chloride 104  103    Calcium 9.2  9.3    Albumin 4.2  4.1    Total Bilirubin 0.3  0.5    Alkaline Phosphatase 69  75    AST (SGOT) 19  18    ALT (SGPT) 12  11    WBC  8.84    Hemoglobin  14.7    Hematocrit  43.6    Platelets  222    Total Cholesterol  155    Triglycerides  151    HDL Cholesterol  42    LDL Cholesterol   87                      Assessment and Plan      Diagnoses and all orders for this visit:    1. Insomnia, unspecified type (Primary)    2. Rheumatoid arthritis involving both hands with positive rheumatoid factor    3. Primary hypertension    4. Hyperlipidemia, unspecified hyperlipidemia type    5. Stage 3a chronic kidney disease  -     US Renal Bilateral    6. Osteopenia, unspecified location  -     DEXA Bone Density Axial    7. Post-menopausal  -     DEXA Bone Density Axial      Assessment & Plan  Sleep issues.  She reports that trazodone helps a little but not significantly.    Arthritis.  She has discontinued methotrexate due to hair loss and is currently using over-the-counter Tylenol for pain management. Swelling is noted on the medial aspect of her wrist and both index fingers, with the right side being worse. She is advised to discuss alternative treatment options with her rheumatologist if symptoms become intolerable.    Chronic kidney disease, stage 3a.  Her chronic kidney disease remains stable, likely related to long-standing high blood pressure. An ultrasound will be ordered to further evaluate her condition. A urine sample will be collected during her next blood work to check for proteinuria. She is advised to continue her current diet rich in fruits and vegetables. If she changes her mind about seeing a nephrologist, she should  inform us.    Health maintenance.  Her liver function tests are within normal limits. Blood glucose levels are well-controlled. Complete blood count (CBC) is normal with no evidence of anemia. White blood cell count is within the normal range. Thyroid function tests are normal. Lipid profile shows improvement, with triglycerides at 151 and LDL cholesterol levels within the normal range. A DEXA scan will be scheduled to monitor her bone density, as the last scan in 2023 showed osteopenia. An influenza vaccine will be administered today. Blood tests, including a urine study, will be repeated in 6 months.    Follow-up  The patient will follow up in 6 months.               Follow Up     No follow-ups on file.    Patient was given instructions and counseling regarding her condition or for health maintenance advice. Please see specific information pulled into the AVS if appropriate.     Wen Leblanc MD   Internal Medicine-Pediatrics     Patient or patient representative verbalized consent for the use of Ambient Listening during the visit with  Wen Leblanc MD for chart documentation. 3/10/2025  00:09 EDT

## 2025-03-04 ENCOUNTER — HOSPITAL ENCOUNTER (OUTPATIENT)
Dept: ULTRASOUND IMAGING | Facility: HOSPITAL | Age: 80
Discharge: HOME | End: 2025-03-04
Admitting: STUDENT IN AN ORGANIZED HEALTH CARE EDUCATION/TRAINING PROGRAM
Payer: MEDICARE

## 2025-03-04 ENCOUNTER — APPOINTMENT (OUTPATIENT)
Dept: BONE DENSITY | Facility: HOSPITAL | Age: 80
End: 2025-03-04
Payer: MEDICARE

## 2025-03-04 PROCEDURE — 76775 US EXAM ABDO BACK WALL LIM: CPT

## 2025-03-11 DIAGNOSIS — G47.00 INSOMNIA, UNSPECIFIED TYPE: ICD-10-CM

## 2025-03-11 RX ORDER — TRAZODONE HYDROCHLORIDE 50 MG/1
50 TABLET ORAL NIGHTLY
Qty: 90 TABLET | Refills: 1 | Status: SHIPPED | OUTPATIENT
Start: 2025-03-11

## 2025-03-11 NOTE — TELEPHONE ENCOUNTER
Patient called office stating she was advise by pcp to increase to 1 tab of trazodone if taking 0.5 wouldn't work, patient stated she is taking 1 tab now and is requesting a refill to be sent to [facility], I have pended order for pcp.

## 2025-03-31 ENCOUNTER — TELEPHONE (OUTPATIENT)
Dept: INTERNAL MEDICINE | Facility: CLINIC | Age: 80
End: 2025-03-31
Payer: MEDICARE

## 2025-03-31 DIAGNOSIS — G47.00 INSOMNIA, UNSPECIFIED TYPE: ICD-10-CM

## 2025-03-31 NOTE — TELEPHONE ENCOUNTER
Caller: Nancie Grissom    Relationship: Self    Best call back number: [phone]     What was the call regarding: PATIENT STATES THAT SHE REALLY NEEDS TO SPEAK WITH DR COOPER ABOUT A PRESCRIPTION.     PATIENT DID NOT WANT TO GIVE ANY FURTHER DETAILS.

## 2025-04-02 RX ORDER — TRAZODONE HYDROCHLORIDE 50 MG/1
50 TABLET ORAL NIGHTLY
Qty: 90 TABLET | Refills: 1 | Status: SHIPPED | OUTPATIENT
Start: 2025-04-02

## 2025-04-02 NOTE — TELEPHONE ENCOUNTER
Returned patient call.   Pt with concern regarding trazodone prescription getting sent to the wrong pharmacy earlier in March.   Refilled trazodone to [facility] pharmacy as requested.

## 2025-04-17 ENCOUNTER — TELEPHONE (OUTPATIENT)
Dept: INTERNAL MEDICINE | Facility: CLINIC | Age: 80
End: 2025-04-17

## 2025-04-17 NOTE — TELEPHONE ENCOUNTER
Spoke with patient to scheduled her for an appt, pt stated she only wants to see pcp, please advise when we can scheduled pt.

## 2025-04-17 NOTE — TELEPHONE ENCOUNTER
Pt requesting follow up from urgent care was prescribed medication but is still feeling sick pt stated she has been having loose stool. Requesting call back

## 2025-04-22 ENCOUNTER — OFFICE VISIT (OUTPATIENT)
Dept: INTERNAL MEDICINE | Facility: CLINIC | Age: 80
End: 2025-04-22
Payer: MEDICARE

## 2025-04-22 ENCOUNTER — TELEPHONE (OUTPATIENT)
Dept: INTERNAL MEDICINE | Facility: CLINIC | Age: 80
End: 2025-04-22

## 2025-04-22 VITALS
OXYGEN SATURATION: 96 % | SYSTOLIC BLOOD PRESSURE: 142 MMHG | DIASTOLIC BLOOD PRESSURE: 84 MMHG | BODY MASS INDEX: 27.81 KG/M2 | TEMPERATURE: 96.6 F | WEIGHT: 147.2 LBS | HEART RATE: 86 BPM | RESPIRATION RATE: 18 BRPM

## 2025-04-22 DIAGNOSIS — R05.1 ACUTE COUGH: ICD-10-CM

## 2025-04-22 DIAGNOSIS — G47.00 INSOMNIA, UNSPECIFIED TYPE: ICD-10-CM

## 2025-04-22 DIAGNOSIS — Z76.0 MEDICATION REFILL: ICD-10-CM

## 2025-04-22 DIAGNOSIS — R19.7 DIARRHEA, UNSPECIFIED TYPE: ICD-10-CM

## 2025-04-22 DIAGNOSIS — J18.9 PNEUMONIA OF LEFT LOWER LOBE DUE TO INFECTIOUS ORGANISM: Primary | ICD-10-CM

## 2025-04-22 LAB
027 TOXIN: ABNORMAL
C DIFF GDH + TOXINS A+B STL QL IA.RAPID: POSITIVE
C DIFF TOX GENS STL QL NAA+PROBE: POSITIVE
LACTOFERRIN STL QL LA: POSITIVE

## 2025-04-22 PROCEDURE — 87493 C DIFF AMPLIFIED PROBE: CPT | Performed by: STUDENT IN AN ORGANIZED HEALTH CARE EDUCATION/TRAINING PROGRAM

## 2025-04-22 PROCEDURE — 1126F AMNT PAIN NOTED NONE PRSNT: CPT | Performed by: STUDENT IN AN ORGANIZED HEALTH CARE EDUCATION/TRAINING PROGRAM

## 2025-04-22 PROCEDURE — G2211 COMPLEX E/M VISIT ADD ON: HCPCS | Performed by: STUDENT IN AN ORGANIZED HEALTH CARE EDUCATION/TRAINING PROGRAM

## 2025-04-22 PROCEDURE — 83630 LACTOFERRIN FECAL (QUAL): CPT | Performed by: STUDENT IN AN ORGANIZED HEALTH CARE EDUCATION/TRAINING PROGRAM

## 2025-04-22 PROCEDURE — 87449 NOS EACH ORGANISM AG IA: CPT | Performed by: STUDENT IN AN ORGANIZED HEALTH CARE EDUCATION/TRAINING PROGRAM

## 2025-04-22 PROCEDURE — 87045 FECES CULTURE AEROBIC BACT: CPT | Performed by: STUDENT IN AN ORGANIZED HEALTH CARE EDUCATION/TRAINING PROGRAM

## 2025-04-22 PROCEDURE — 3079F DIAST BP 80-89 MM HG: CPT | Performed by: STUDENT IN AN ORGANIZED HEALTH CARE EDUCATION/TRAINING PROGRAM

## 2025-04-22 PROCEDURE — 87046 STOOL CULTR AEROBIC BACT EA: CPT | Performed by: STUDENT IN AN ORGANIZED HEALTH CARE EDUCATION/TRAINING PROGRAM

## 2025-04-22 PROCEDURE — 99214 OFFICE O/P EST MOD 30 MIN: CPT | Performed by: STUDENT IN AN ORGANIZED HEALTH CARE EDUCATION/TRAINING PROGRAM

## 2025-04-22 PROCEDURE — 3077F SYST BP >= 140 MM HG: CPT | Performed by: STUDENT IN AN ORGANIZED HEALTH CARE EDUCATION/TRAINING PROGRAM

## 2025-04-22 PROCEDURE — 87427 SHIGA-LIKE TOXIN AG IA: CPT | Performed by: STUDENT IN AN ORGANIZED HEALTH CARE EDUCATION/TRAINING PROGRAM

## 2025-04-22 RX ORDER — HYDROCHLOROTHIAZIDE 25 MG/1
25 TABLET ORAL DAILY
Qty: 90 TABLET | Refills: 1 | Status: SHIPPED | OUTPATIENT
Start: 2025-04-22

## 2025-04-22 RX ORDER — BENZONATATE 100 MG/1
100 CAPSULE ORAL 3 TIMES DAILY PRN
Qty: 30 CAPSULE | Refills: 0 | Status: SHIPPED | OUTPATIENT
Start: 2025-04-22 | End: 2025-04-22

## 2025-04-22 RX ORDER — ATORVASTATIN CALCIUM 20 MG/1
20 TABLET, FILM COATED ORAL NIGHTLY
Qty: 90 TABLET | Refills: 1 | Status: SHIPPED | OUTPATIENT
Start: 2025-04-22

## 2025-04-22 RX ORDER — BENZONATATE 100 MG/1
100 CAPSULE ORAL 3 TIMES DAILY PRN
Qty: 30 CAPSULE | Refills: 0 | Status: SHIPPED | OUTPATIENT
Start: 2025-04-22 | End: 2025-05-06

## 2025-04-22 RX ORDER — ALBUTEROL SULFATE 90 UG/1
2 INHALANT RESPIRATORY (INHALATION) EVERY 4 HOURS PRN
Qty: 18 G | Refills: 0 | Status: SHIPPED | OUTPATIENT
Start: 2025-04-22

## 2025-04-22 RX ORDER — PANTOPRAZOLE SODIUM 40 MG/1
40 TABLET, DELAYED RELEASE ORAL DAILY
Qty: 90 TABLET | Refills: 1 | Status: SHIPPED | OUTPATIENT
Start: 2025-04-22

## 2025-04-22 RX ORDER — AMITRIPTYLINE HYDROCHLORIDE 10 MG/1
10 TABLET ORAL NIGHTLY
Qty: 90 TABLET | Refills: 1 | Status: SHIPPED | OUTPATIENT
Start: 2025-04-22

## 2025-04-22 RX ORDER — LEVOTHYROXINE SODIUM 50 UG/1
50 TABLET ORAL DAILY
Qty: 90 TABLET | Refills: 1 | Status: SHIPPED | OUTPATIENT
Start: 2025-04-22

## 2025-04-22 RX ORDER — TRAZODONE HYDROCHLORIDE 50 MG/1
50 TABLET ORAL NIGHTLY
Qty: 90 TABLET | Refills: 1 | Status: SHIPPED | OUTPATIENT
Start: 2025-04-22

## 2025-04-22 RX ORDER — LISINOPRIL 10 MG/1
10 TABLET ORAL DAILY
Qty: 90 TABLET | Refills: 1 | Status: SHIPPED | OUTPATIENT
Start: 2025-04-22

## 2025-04-22 NOTE — PROGRESS NOTES
"Chief Complaint  Diarrhea (Started with taking antibiotics, been going on for 2 weeks ) and Cough (Left lower lobe pneumonia. No xray and antibiotics were given. )    Subjective            Nancie Grissom presents to [facility]  History of Present Illness    Cough:  Ongoing for almost 4 weeks.   Seen at  on 4/8 and lung exam was abnormal over the LLL, and pt was dx with PNA. She was treated with Augmentin. Pt states her cough is not any better today compared to before starting the antibiotics nor since.   No fevers, however pt has had 'sweating episodes\".   Cough is productive of clear sputum.   Denies chest pain, however stats she does not feel well.     Diarrhea:   Started immediately after she started the antibiotics. Stools are slimy. No blood in stools. States she is having small, frequent stools, multiple times a day. She's had about 3lb wt loss.       Past Medical History:   Diagnosis Date    Arthritis     Depression     GERD (gastroesophageal reflux disease) 02/12/2015    Hyperlipidemia     Hypertension     Hypothyroidism     Osteoporosis     Vitamin D deficiency        Allergies:   Allergies   Allergen Reactions    Methotrexate Other (See Comments)     Pt caused hair loss           Past Surgical History:   Procedure Laterality Date    ROTATOR CUFF REPAIR Right           Social History     Socioeconomic History    Marital status:    Tobacco Use    Smoking status: Never    Smokeless tobacco: Never   Vaping Use    Vaping status: Never Used   Substance and Sexual Activity    Alcohol use: Yes     Comment: rarely    Drug use: Never    Sexual activity: Defer         History reviewed. No pertinent family history.       Health Maintenance Due   Topic Date Due    TDAP/TD VACCINES (1 - Tdap) Never done    ZOSTER VACCINE (1 of 2) Never done    RSV Vaccine - Adults (1 - 1-dose 75+ series) Never done    COVID-19 Vaccine (2 - 2024-25 season) 09/01/2024    DXA SCAN  " 03/21/2025            Current Outpatient Medications:     albuterol sulfate  (90 Base) MCG/ACT inhaler, Inhale 2 puffs Every 4 (Four) Hours As Needed for Wheezing., Disp: 18 g, Rfl: 0    amitriptyline (ELAVIL) 10 MG tablet, Take 1 tablet by mouth Every Night., Disp: 90 tablet, Rfl: 1    atorvastatin (LIPITOR) 20 MG tablet, Take 1 tablet by mouth Every Night., Disp: 90 tablet, Rfl: 1    benzonatate (Tessalon Perles) 100 MG capsule, Take 1 capsule by mouth 3 (Three) Times a Day As Needed for Cough., Disp: 30 capsule, Rfl: 0    Diclofenac Sodium (VOLTAREN) 1 % gel gel, Apply  topically to the appropriate area as directed 4 (Four) Times a Day., Disp: 150 g, Rfl: 3    hydroCHLOROthiazide 25 MG tablet, Take 1 tablet by mouth Daily., Disp: 90 tablet, Rfl: 1    levothyroxine (Synthroid) 50 MCG tablet, Take 1 tablet by mouth Daily., Disp: 90 tablet, Rfl: 1    lisinopril (PRINIVIL,ZESTRIL) 10 MG tablet, Take 1 tablet by mouth Daily., Disp: 90 tablet, Rfl: 1    pantoprazole (Protonix) 40 MG EC tablet, Take 1 tablet by mouth Daily., Disp: 90 tablet, Rfl: 1    traZODone (DESYREL) 50 MG tablet, Take 1 tablet by mouth Every Night., Disp: 90 tablet, Rfl: 1    folic acid (FOLVITE) 1 MG tablet, TAKE 1 TABLET BY MOUTH EVERY DAY for 90 (Patient not taking: Reported on 4/22/2025), Disp: , Rfl:       Immunization History   Administered Date(s) Administered    COVID-19 ([employer]) 04/05/2021    Flu Vaccine Quad PF >36MO 02/09/2018    Fluzone High-Dose 65+YRS 01/02/2025    Fluzone High-Dose 65+yrs 10/26/2022, 10/17/2023    Influenza, Unspecified 10/21/2019    Pneumococcal Conjugate 20-Valent (PCV20) 10/17/2023    Pneumococcal Polysaccharide (PPSV23) 08/12/2014         Review of Systems       Objective       Vitals:    04/22/25 0855   BP: 142/84   BP Location: Right arm   Patient Position: Sitting   Cuff Size: Adult   Pulse: 86   Resp: 18   Temp: 96.6 °F (35.9 °C)   TempSrc: Temporal   SpO2: 96%   Weight: 66.8 kg (147 lb 3.2 oz)      Physical Exam  Vitals reviewed.   Constitutional:       Appearance: Normal appearance.   HENT:      Head: Normocephalic and atraumatic.      Nose: Nose normal.   Eyes:      Extraocular Movements: Extraocular movements intact.      Conjunctiva/sclera: Conjunctivae normal.   Cardiovascular:      Rate and Rhythm: Normal rate and regular rhythm.      Pulses: Normal pulses.      Heart sounds: Normal heart sounds.   Pulmonary:      Effort: Pulmonary effort is normal. No respiratory distress.      Breath sounds: No stridor. No wheezing.   Abdominal:      General: Bowel sounds are normal.      Palpations: Abdomen is soft.      Tenderness: There is no abdominal tenderness. There is no guarding or rebound.   Musculoskeletal:         General: Normal range of motion.   Skin:     General: Skin is warm and dry.   Neurological:      General: No focal deficit present.      Mental Status: She is alert and oriented to person, place, and time.      Cranial Nerves: No cranial nerve deficit.   Psychiatric:         Mood and Affect: Mood normal.         Behavior: Behavior normal.         Thought Content: Thought content normal.             Result Review :                           Assessment and Plan        Assessment & Plan  Pneumonia of left lower lobe due to infectious organism  Acute with persistent and unchanged cough per pt. CXR ordered for further eval. Discussed possible need to either extend vs change abx regimen.  Orders:    albuterol sulfate  (90 Base) MCG/ACT inhaler; Inhale 2 puffs Every 4 (Four) Hours As Needed for Wheezing.    XR Chest PA & Lateral (In Office)    Diarrhea, unspecified type  Acute, concern for c-diff in light of recent abx exposures. Stool study ordered.  Orders:    Clostridioides difficile Toxin, PCR - Stool, Per Rectum    Fecal Lactoferrin Qual. - Stool, Per Rectum    Stool Culture (Reference Lab) - Stool, Per Rectum    Insomnia, unspecified type  Chronic, improved with trazodone.     Orders:     traZODone (DESYREL) 50 MG tablet; Take 1 tablet by mouth Every Night.    Medication refill  Refilled meds  Orders:    amitriptyline (ELAVIL) 10 MG tablet; Take 1 tablet by mouth Every Night.    atorvastatin (LIPITOR) 20 MG tablet; Take 1 tablet by mouth Every Night.    Diclofenac Sodium (VOLTAREN) 1 % gel gel; Apply  topically to the appropriate area as directed 4 (Four) Times a Day.    hydroCHLOROthiazide 25 MG tablet; Take 1 tablet by mouth Daily.    levothyroxine (Synthroid) 50 MCG tablet; Take 1 tablet by mouth Daily.    lisinopril (PRINIVIL,ZESTRIL) 10 MG tablet; Take 1 tablet by mouth Daily.    pantoprazole (Protonix) 40 MG EC tablet; Take 1 tablet by mouth Daily.    Acute cough  See above.   Tessalon perles sent in.   Orders:    XR Chest PA & Lateral (In Office)    benzonatate (Tessalon Perles) 100 MG capsule; Take 1 capsule by mouth 3 (Three) Times a Day As Needed for Cough.                  Follow Up     No follow-ups on file.    Patient was given instructions and counseling regarding her condition or for health maintenance advice. Please see specific information pulled into the AVS if appropriate.     Wen Leblanc MD   Internal Medicine-Pediatrics

## 2025-04-22 NOTE — TELEPHONE ENCOUNTER
Called and notified patient that xray orders have been changed and they can get this completed at outpatient services.

## 2025-04-23 ENCOUNTER — HOSPITAL ENCOUNTER (OUTPATIENT)
Dept: GENERAL RADIOLOGY | Facility: HOSPITAL | Age: 80
Discharge: HOME | End: 2025-04-23
Admitting: STUDENT IN AN ORGANIZED HEALTH CARE EDUCATION/TRAINING PROGRAM
Payer: MEDICARE

## 2025-04-23 ENCOUNTER — TELEPHONE (OUTPATIENT)
Dept: INTERNAL MEDICINE | Facility: CLINIC | Age: 80
End: 2025-04-23
Payer: MEDICARE

## 2025-04-23 PROCEDURE — 71046 X-RAY EXAM CHEST 2 VIEWS: CPT

## 2025-04-23 NOTE — TELEPHONE ENCOUNTER
Caller: Nancie Grissom    Relationship: Self    Best call back number:     [phone]       What medication are you requesting: SOMETHING FOR C DIFF    What are your current symptoms: SAW HER TEST RESULTS IN [facility] [place] WERE POSITIVE FOR C DIFF    Have you had these symptoms before:    [] Yes  [] No    Have you been treated for these symptoms before:   [] Yes  [] No    If a prescription is needed, what is your [facility] [address] [place] [address] [phone] [facility] [phone]      Additional notes:    PATIENT WOULD LIKE A CALLBACK

## 2025-04-24 ENCOUNTER — RESULTS FOLLOW-UP (OUTPATIENT)
Dept: INTERNAL MEDICINE | Facility: CLINIC | Age: 80
End: 2025-04-24
Payer: MEDICARE

## 2025-04-24 ENCOUNTER — TELEPHONE (OUTPATIENT)
Dept: INTERNAL MEDICINE | Facility: CLINIC | Age: 80
End: 2025-04-24
Payer: MEDICARE

## 2025-04-24 DIAGNOSIS — A04.72 C. DIFFICILE DIARRHEA: Primary | ICD-10-CM

## 2025-04-24 DIAGNOSIS — J18.9 PNEUMONIA OF LEFT LOWER LOBE DUE TO INFECTIOUS ORGANISM: ICD-10-CM

## 2025-04-24 NOTE — TELEPHONE ENCOUNTER
Called and spoke with pt, explained to pt positive results and the need for medication, explained to pt medication has been sent to the pharmacy, and I explained to pt she should take medication 2 times a day for ten days. Pt verbalized understanding and had no further questions at this time.

## 2025-04-24 NOTE — TELEPHONE ENCOUNTER
Caller: Nancie Grissom    Relationship: Self    Best call back number:     [phone]        What was the call regarding: PATIENT CALLED BECAUSE SHE HAS STILL NOT HEARD ANYTHING ABOUT THIS.     SHE STATES THAT SHE IS STILL HAVING SYMPTOMS AND THAT SHE IS REALLY NOT FEELING WELL.     SHE ALSO STATES THAT SHE WOULD LIKE TO CHANGE THE PHARMACY FOR THIS TO [address] - [phone]  - [phone]      PATIENT WOULD LIKE A CALLBACK.

## 2025-04-24 NOTE — TELEPHONE ENCOUNTER
Caller: Nancie Grissom    Relationship: Self    Best call back number: [phone]    Requested Prescriptions:   Requested Prescriptions     Pending Prescriptions Disp Refills    fidaxomicin (DIFICID) 200 MG tablet 20 tablet 0     Sig: Take 1 tablet by mouth 2 (Two) Times a Day.        Pharmacy where request should be sent: [facility], [place] [address] - [phone] [facility] [phone]      Last office visit with prescribing clinician: 4/22/2025   Last telemedicine visit with prescribing clinician: Visit date not found   Next office visit with prescribing clinician: 5/21/2025     Additional details provided by patient: THIS MEDICATION IS OUT OF STOCK AT EVERY PHARMACY IN TOWN EXCEPT FOR [facility]. THEY CURRENTLY HAS 30 PILLS OF THIS MEDICATION. PATIENT WOULD LIKE TO HAVE THIS SENT TO THEM AS SOON AS POSSIBLE. SHE WOULD LIKE A CALL WHEN SENT.     Does the patient have less than a 3 day supply:  [x] Yes  [] No    Would you like a call back once the refill request has been completed: [x] Yes [] No    If the office needs to give you a call back, can they leave a voicemail: [x] Yes [] No    Narciso Lopez Rep   04/24/25 11:34 EDT

## 2025-04-26 LAB
BACTERIA SPEC CULT: NORMAL
BACTERIA SPEC CULT: NORMAL
CAMPYLOBACTER STL CULT: NORMAL
E COLI SXT STL QL IA: NEGATIVE
SALM + SHIG STL CULT: NORMAL

## 2025-04-28 ENCOUNTER — TELEPHONE (OUTPATIENT)
Dept: INTERNAL MEDICINE | Facility: CLINIC | Age: 80
End: 2025-04-28
Payer: MEDICARE

## 2025-04-28 ENCOUNTER — CLINICAL SUPPORT (OUTPATIENT)
Dept: INTERNAL MEDICINE | Facility: CLINIC | Age: 80
End: 2025-04-28
Payer: MEDICARE

## 2025-04-28 DIAGNOSIS — J18.9 PNEUMONIA DUE TO INFECTIOUS ORGANISM, UNSPECIFIED LATERALITY, UNSPECIFIED PART OF LUNG: Primary | ICD-10-CM

## 2025-04-28 DIAGNOSIS — J18.9 PNEUMONIA OF LEFT LOWER LOBE DUE TO INFECTIOUS ORGANISM: ICD-10-CM

## 2025-04-28 DIAGNOSIS — J18.9 PNEUMONIA OF LEFT LOWER LOBE DUE TO INFECTIOUS ORGANISM: Primary | ICD-10-CM

## 2025-04-28 PROCEDURE — 96372 THER/PROPH/DIAG INJ SC/IM: CPT | Performed by: STUDENT IN AN ORGANIZED HEALTH CARE EDUCATION/TRAINING PROGRAM

## 2025-04-28 RX ORDER — LEVOFLOXACIN 750 MG/1
750 TABLET, FILM COATED ORAL DAILY
Qty: 7 TABLET | Refills: 0 | Status: SHIPPED | OUTPATIENT
Start: 2025-04-28

## 2025-04-28 RX ORDER — CEFTRIAXONE 1 G/1
1 INJECTION, POWDER, FOR SOLUTION INTRAMUSCULAR; INTRAVENOUS EVERY 24 HOURS
Status: COMPLETED | OUTPATIENT
Start: 2025-04-28 | End: 2025-05-02

## 2025-04-28 RX ADMIN — CEFTRIAXONE 1 G: 1 INJECTION, POWDER, FOR SOLUTION INTRAMUSCULAR; INTRAVENOUS at 14:29

## 2025-04-28 NOTE — TELEPHONE ENCOUNTER
Caller: Nancie Grissom    Relationship: Self    Best call back number: [phone]    What was the call regarding: PATIENT IS CALLING KENNETH BACK TO LET HER KNOW THAT HER SON IS BRINGING HER

## 2025-04-28 NOTE — TELEPHONE ENCOUNTER
Patient called office stating her son was able to bring her in for rocephin injection, spoke with Dr Carver in regards patient request, Dr Carver stated pt can come to have abx injection, provider ask to cx prescription that was sent to the pharmacy and scheduled pt for nurse visit.

## 2025-04-28 NOTE — TELEPHONE ENCOUNTER
Caller: Nancie Grissom    Relationship to patient: Self    Best call back number: [phone]    Patient is needing: PATIENT CALLED BACK AGAIN REQUESTING SOMEONE TO PLEASE GIVE HER A CALLBACK ABOUT THE X RAY RESULTS. PATIENT STATES SHE KNOWS SHE IS GOING TO NEED MEDICATION BUT UNLESS THE MEDICATION IS CALLED INTO [place] BEFORE 1PM TODAY SHE WOULD NOT BE ABLE TO PICK IT UP UNTIL TOMORROW. PATIENT STATES SHE IS MISERABLE AND WOULD LIKE TO SPEAK WITH SOMEONE IN THE OFFICE ASAP.

## 2025-04-28 NOTE — TELEPHONE ENCOUNTER
Pt changed her mind and now has transportation to come in office for IM injections of ceftriaxone. Nurse Jean-Claude to call and recall rx for levofloxacin sent to [facility] earlier today.

## 2025-04-28 NOTE — PROGRESS NOTES
Patient came into office for administration of Rocephin 1000mg IM; see eMAR for details. Medication education provided for patient including probiotics and ADR / SE associated with Rocephin; printed format sent with patient as well.  Left immediately following; no 15 min OBS.  Patient to return for repeat.  See orders for details.    Velvet Payne RN BSN  [facility] [place] office

## 2025-04-28 NOTE — TELEPHONE ENCOUNTER
Caller: Nancie Grissom    Relationship to patient: Self    Best call back number: [phone]    Patient is needing: PATIENT CALLED IN AND SAID SHE HAD SEEN THE RESULTS OF HER X-RAY AND HAS PNEUMONIA AND IS WANTING TO HAVE A CALL BACK TO SEE WHAT MEDICATION SHE WILL BE PRESCRIBED.

## 2025-04-29 ENCOUNTER — CLINICAL SUPPORT (OUTPATIENT)
Dept: INTERNAL MEDICINE | Facility: CLINIC | Age: 80
End: 2025-04-29
Payer: MEDICARE

## 2025-04-29 DIAGNOSIS — J18.9 PNEUMONIA OF LEFT LOWER LOBE DUE TO INFECTIOUS ORGANISM: Primary | ICD-10-CM

## 2025-04-29 PROCEDURE — 96372 THER/PROPH/DIAG INJ SC/IM: CPT | Performed by: STUDENT IN AN ORGANIZED HEALTH CARE EDUCATION/TRAINING PROGRAM

## 2025-04-29 RX ADMIN — CEFTRIAXONE 1 G: 1 INJECTION, POWDER, FOR SOLUTION INTRAMUSCULAR; INTRAVENOUS at 13:01

## 2025-04-29 NOTE — PROGRESS NOTES
Patient came into office for administration of Rocephin 1GM IM shot; see eMAR for details; tolerated well; left immediately following; no 15 min  OBS.    Velvet Payne RN BSN  [facility]

## 2025-04-30 ENCOUNTER — CLINICAL SUPPORT (OUTPATIENT)
Dept: INTERNAL MEDICINE | Facility: CLINIC | Age: 80
End: 2025-04-30
Payer: MEDICARE

## 2025-04-30 VITALS
TEMPERATURE: 97.4 F | RESPIRATION RATE: 20 BRPM | DIASTOLIC BLOOD PRESSURE: 64 MMHG | HEART RATE: 93 BPM | OXYGEN SATURATION: 95 % | SYSTOLIC BLOOD PRESSURE: 128 MMHG

## 2025-04-30 DIAGNOSIS — J18.9 PNEUMONIA DUE TO INFECTIOUS ORGANISM, UNSPECIFIED LATERALITY, UNSPECIFIED PART OF LUNG: Primary | ICD-10-CM

## 2025-04-30 RX ADMIN — CEFTRIAXONE 1 G: 1 INJECTION, POWDER, FOR SOLUTION INTRAMUSCULAR; INTRAVENOUS at 11:31

## 2025-04-30 NOTE — PROGRESS NOTES
Patient came into office for administration of Rocephin 1GM IM shot; see eMAR for details; Patient was unaware that she was supposed to be getting 5 doses of Rocephin, she thought it was just three, confirmed with  and scheduled patient for the remaining two doses. Patient and patients son also stated that they were becoming concerned because she was not feeling any better, relayed message to  she stated that they may need to be seen in the ER, and when if office on 5/1 to let the MA/Nurse know how she is feeling so that it can be relayed to , patient voiced understanding. Obtained patients vital signs per  request see vitals for more in depth description. Patient left after having vitals taken.

## 2025-05-01 ENCOUNTER — CLINICAL SUPPORT (OUTPATIENT)
Dept: INTERNAL MEDICINE | Facility: CLINIC | Age: 80
End: 2025-05-01
Payer: MEDICARE

## 2025-05-01 DIAGNOSIS — J18.9 PNEUMONIA OF LEFT LOWER LOBE DUE TO INFECTIOUS ORGANISM: Primary | ICD-10-CM

## 2025-05-01 PROCEDURE — 96372 THER/PROPH/DIAG INJ SC/IM: CPT | Performed by: STUDENT IN AN ORGANIZED HEALTH CARE EDUCATION/TRAINING PROGRAM

## 2025-05-01 RX ADMIN — CEFTRIAXONE 1 G: 1 INJECTION, POWDER, FOR SOLUTION INTRAMUSCULAR; INTRAVENOUS at 11:33

## 2025-05-01 NOTE — PROGRESS NOTES
Patient arrived to office to receive 4th dose of Rocephin this morning.  While in office, confirmed patient's cough is mild and intermittent with clear secretions only.  She denied any fever or chills; patient confirmed she is not drinking enough fluid and probably not getting enough protein.  Encouraged patient to increase fluid as tolerated and protein intake - recommended protein shakes.  Patient c/o feeling weak and tired.  Education completed with patient that she recently had c. Diff infection followed by pneumonia - there will be a time where she has weakness and fatigue as her body is healing from both of those recent illnesses.  Patient verbalized understanding.  Patient inquired if she needed a repeat chest Xray and if she could see Dr. Carver while she is in the office.  Spoke with provider and confirmed patient would have to wait until other patient's are seen OR if she feels the need for assessment she could visit the ER / ED.  Provider recommends she complete the 5 day course of treatment with Rocephin and then contact office on Monday if she feels like symptoms have gotten worse or not improved.  Education provided again to see immediate medical attention at the ER / ED for any chest pain or SOB.  Patient and son both verbalized understanding.  Proceeded at this time to administer Rocephin 1GM IM injection; see eMAR for details.  Tolerated well; left immediately following; no 15 min OBS.    Velvet Payne RN BSN  [facility]

## 2025-05-02 ENCOUNTER — CLINICAL SUPPORT (OUTPATIENT)
Dept: INTERNAL MEDICINE | Facility: CLINIC | Age: 80
End: 2025-05-02
Payer: MEDICARE

## 2025-05-02 DIAGNOSIS — A04.72 C. DIFFICILE DIARRHEA: Primary | ICD-10-CM

## 2025-05-02 PROCEDURE — 96372 THER/PROPH/DIAG INJ SC/IM: CPT | Performed by: STUDENT IN AN ORGANIZED HEALTH CARE EDUCATION/TRAINING PROGRAM

## 2025-05-02 RX ADMIN — CEFTRIAXONE 1 G: 1 INJECTION, POWDER, FOR SOLUTION INTRAMUSCULAR; INTRAVENOUS at 11:56

## 2025-05-02 NOTE — PROGRESS NOTES
Patient came into office for administration of Ceftriaxone 1g.  Medication education provided for patient / caregiver.  See eMAR records for details; tolerated well; left immediately following; no 15 min OBS.

## 2025-05-06 ENCOUNTER — APPOINTMENT (OUTPATIENT)
Dept: CT IMAGING | Facility: HOSPITAL | Age: 80
DRG: 168 | End: 2025-05-06
Payer: MEDICARE

## 2025-05-06 ENCOUNTER — HOSPITAL ENCOUNTER (INPATIENT)
Facility: HOSPITAL | Age: 80
LOS: 3 days | Discharge: HOME | DRG: 168 | End: 2025-05-09
Attending: EMERGENCY MEDICINE | Admitting: STUDENT IN AN ORGANIZED HEALTH CARE EDUCATION/TRAINING PROGRAM
Payer: MEDICARE

## 2025-05-06 ENCOUNTER — APPOINTMENT (OUTPATIENT)
Dept: GENERAL RADIOLOGY | Facility: HOSPITAL | Age: 80
DRG: 168 | End: 2025-05-06
Payer: MEDICARE

## 2025-05-06 DIAGNOSIS — J18.9 PNEUMONIA OF LEFT UPPER LOBE DUE TO INFECTIOUS ORGANISM: Primary | ICD-10-CM

## 2025-05-06 DIAGNOSIS — N63.20 MASS OF LEFT BREAST, UNSPECIFIED QUADRANT: ICD-10-CM

## 2025-05-06 DIAGNOSIS — Z98.890 HISTORY OF THORACENTESIS: ICD-10-CM

## 2025-05-06 DIAGNOSIS — R16.0 LIVER MASS: ICD-10-CM

## 2025-05-06 LAB
ALBUMIN SERPL-MCNC: 3.8 G/DL (ref 3.5–5.2)
ALBUMIN/GLOB SERPL: 1.2 G/DL
ALP SERPL-CCNC: 72 U/L (ref 39–117)
ALT SERPL W P-5'-P-CCNC: 11 U/L (ref 1–33)
ANION GAP SERPL CALCULATED.3IONS-SCNC: 11.6 MMOL/L (ref 5–15)
AST SERPL-CCNC: 21 U/L (ref 1–32)
B PARAPERT DNA SPEC QL NAA+PROBE: NOT DETECTED
B PERT DNA SPEC QL NAA+PROBE: NOT DETECTED
BACTERIA UR QL AUTO: ABNORMAL /HPF
BASOPHILS # BLD AUTO: 0.06 10*3/MM3 (ref 0–0.2)
BASOPHILS NFR BLD AUTO: 0.5 % (ref 0–1.5)
BILIRUB SERPL-MCNC: 0.4 MG/DL (ref 0–1.2)
BILIRUB UR QL STRIP: NEGATIVE
BUN SERPL-MCNC: 17 MG/DL (ref 8–23)
BUN/CREAT SERPL: 15.7 (ref 7–25)
C PNEUM DNA NPH QL NAA+NON-PROBE: NOT DETECTED
CALCIUM SPEC-SCNC: 8.9 MG/DL (ref 8.6–10.5)
CHLORIDE SERPL-SCNC: 101 MMOL/L (ref 98–107)
CLARITY UR: CLEAR
CO2 SERPL-SCNC: 24.4 MMOL/L (ref 22–29)
COLOR UR: ABNORMAL
CREAT SERPL-MCNC: 1.08 MG/DL (ref 0.57–1)
DEPRECATED RDW RBC AUTO: 47.1 FL (ref 37–54)
EGFRCR SERPLBLD CKD-EPI 2021: 51.7 ML/MIN/1.73
EOSINOPHIL # BLD AUTO: 0.19 10*3/MM3 (ref 0–0.4)
EOSINOPHIL NFR BLD AUTO: 1.7 % (ref 0.3–6.2)
ERYTHROCYTE [DISTWIDTH] IN BLOOD BY AUTOMATED COUNT: 14 % (ref 12.3–15.4)
FLUAV RNA RESP QL NAA+PROBE: NOT DETECTED
FLUAV SUBTYP SPEC NAA+PROBE: NOT DETECTED
FLUBV RNA ISLT QL NAA+PROBE: NOT DETECTED
FLUBV RNA RESP QL NAA+PROBE: NOT DETECTED
GEN 5 1HR TROPONIN T REFLEX: 17 NG/L
GLOBULIN UR ELPH-MCNC: 3.2 GM/DL
GLUCOSE SERPL-MCNC: 106 MG/DL (ref 65–99)
GLUCOSE UR STRIP-MCNC: NEGATIVE MG/DL
HADV DNA SPEC NAA+PROBE: NOT DETECTED
HCOV 229E RNA SPEC QL NAA+PROBE: NOT DETECTED
HCOV HKU1 RNA SPEC QL NAA+PROBE: NOT DETECTED
HCOV NL63 RNA SPEC QL NAA+PROBE: NOT DETECTED
HCOV OC43 RNA SPEC QL NAA+PROBE: NOT DETECTED
HCT VFR BLD AUTO: 41.6 % (ref 34–46.6)
HGB BLD-MCNC: 14 G/DL (ref 12–15.9)
HGB UR QL STRIP.AUTO: NEGATIVE
HMPV RNA NPH QL NAA+NON-PROBE: NOT DETECTED
HOLD SPECIMEN: NORMAL
HOLD SPECIMEN: NORMAL
HPIV1 RNA ISLT QL NAA+PROBE: NOT DETECTED
HPIV2 RNA SPEC QL NAA+PROBE: NOT DETECTED
HPIV3 RNA NPH QL NAA+PROBE: NOT DETECTED
HPIV4 P GENE NPH QL NAA+PROBE: NOT DETECTED
HYALINE CASTS UR QL AUTO: ABNORMAL /LPF
IMM GRANULOCYTES # BLD AUTO: 0.04 10*3/MM3 (ref 0–0.05)
IMM GRANULOCYTES NFR BLD AUTO: 0.4 % (ref 0–0.5)
KETONES UR QL STRIP: ABNORMAL
LEUKOCYTE ESTERASE UR QL STRIP.AUTO: ABNORMAL
LYMPHOCYTES # BLD AUTO: 0.95 10*3/MM3 (ref 0.7–3.1)
LYMPHOCYTES NFR BLD AUTO: 8.5 % (ref 19.6–45.3)
M PNEUMO IGG SER IA-ACNC: NOT DETECTED
MAGNESIUM SERPL-MCNC: 2.2 MG/DL (ref 1.6–2.4)
MCH RBC QN AUTO: 30.8 PG (ref 26.6–33)
MCHC RBC AUTO-ENTMCNC: 33.7 G/DL (ref 31.5–35.7)
MCV RBC AUTO: 91.6 FL (ref 79–97)
MONOCYTES # BLD AUTO: 0.59 10*3/MM3 (ref 0.1–0.9)
MONOCYTES NFR BLD AUTO: 5.3 % (ref 5–12)
NEUTROPHILS NFR BLD AUTO: 83.6 % (ref 42.7–76)
NEUTROPHILS NFR BLD AUTO: 9.38 10*3/MM3 (ref 1.7–7)
NITRITE UR QL STRIP: NEGATIVE
NRBC BLD AUTO-RTO: 0 /100 WBC (ref 0–0.2)
PH UR STRIP.AUTO: 6 [PH] (ref 5–8)
PLATELET # BLD AUTO: 309 10*3/MM3 (ref 140–450)
PMV BLD AUTO: 10.3 FL (ref 6–12)
POTASSIUM SERPL-SCNC: 4.2 MMOL/L (ref 3.5–5.2)
PROCALCITONIN SERPL-MCNC: 0.06 NG/ML (ref 0–0.25)
PROT SERPL-MCNC: 7 G/DL (ref 6–8.5)
PROT UR QL STRIP: ABNORMAL
QT INTERVAL: 358 MS
QTC INTERVAL: 439 MS
RBC # BLD AUTO: 4.54 10*6/MM3 (ref 3.77–5.28)
RBC # UR STRIP: ABNORMAL /HPF
REF LAB TEST METHOD: ABNORMAL
RHINOVIRUS RNA SPEC NAA+PROBE: NOT DETECTED
RSV RNA NPH QL NAA+NON-PROBE: NOT DETECTED
RSV RNA RESP QL NAA+PROBE: NOT DETECTED
SARS-COV-2 RNA RESP QL NAA+PROBE: NOT DETECTED
SARS-COV-2 RNA RESP QL NAA+PROBE: NOT DETECTED
SODIUM SERPL-SCNC: 137 MMOL/L (ref 136–145)
SP GR UR STRIP: 1.02 (ref 1–1.03)
SQUAMOUS #/AREA URNS HPF: ABNORMAL /HPF
TROPONIN T % DELTA: -11
TROPONIN T NUMERIC DELTA: -2 NG/L
TROPONIN T SERPL HS-MCNC: 19 NG/L
UROBILINOGEN UR QL STRIP: ABNORMAL
WBC # UR STRIP: ABNORMAL /HPF
WBC NRBC COR # BLD AUTO: 11.21 10*3/MM3 (ref 3.4–10.8)
WHOLE BLOOD HOLD COAG: NORMAL
WHOLE BLOOD HOLD SPECIMEN: NORMAL
YEAST URNS QL MICRO: ABNORMAL /HPF

## 2025-05-06 PROCEDURE — 25010000002 VANCOMYCIN 5 G RECONSTITUTED SOLUTION: Performed by: EMERGENCY MEDICINE

## 2025-05-06 PROCEDURE — 25010000002 CEFEPIME PER 500 MG: Performed by: EMERGENCY MEDICINE

## 2025-05-06 PROCEDURE — 99223 1ST HOSP IP/OBS HIGH 75: CPT | Performed by: STUDENT IN AN ORGANIZED HEALTH CARE EDUCATION/TRAINING PROGRAM

## 2025-05-06 PROCEDURE — 81001 URINALYSIS AUTO W/SCOPE: CPT | Performed by: EMERGENCY MEDICINE

## 2025-05-06 PROCEDURE — 84484 ASSAY OF TROPONIN QUANT: CPT | Performed by: EMERGENCY MEDICINE

## 2025-05-06 PROCEDURE — 83735 ASSAY OF MAGNESIUM: CPT

## 2025-05-06 PROCEDURE — 85025 COMPLETE CBC W/AUTO DIFF WBC: CPT

## 2025-05-06 PROCEDURE — 84145 PROCALCITONIN (PCT): CPT | Performed by: STUDENT IN AN ORGANIZED HEALTH CARE EDUCATION/TRAINING PROGRAM

## 2025-05-06 PROCEDURE — 36415 COLL VENOUS BLD VENIPUNCTURE: CPT

## 2025-05-06 PROCEDURE — 87040 BLOOD CULTURE FOR BACTERIA: CPT | Performed by: EMERGENCY MEDICINE

## 2025-05-06 PROCEDURE — 87641 MR-STAPH DNA AMP PROBE: CPT | Performed by: STUDENT IN AN ORGANIZED HEALTH CARE EDUCATION/TRAINING PROGRAM

## 2025-05-06 PROCEDURE — 80053 COMPREHEN METABOLIC PANEL: CPT

## 2025-05-06 PROCEDURE — 93005 ELECTROCARDIOGRAM TRACING: CPT

## 2025-05-06 PROCEDURE — 0202U NFCT DS 22 TRGT SARS-COV-2: CPT | Performed by: EMERGENCY MEDICINE

## 2025-05-06 PROCEDURE — 25510000001 IOPAMIDOL PER 1 ML: Performed by: EMERGENCY MEDICINE

## 2025-05-06 PROCEDURE — 86738 MYCOPLASMA ANTIBODY: CPT | Performed by: EMERGENCY MEDICINE

## 2025-05-06 PROCEDURE — 25810000003 SODIUM CHLORIDE 0.9 % SOLUTION: Performed by: EMERGENCY MEDICINE

## 2025-05-06 PROCEDURE — 93005 ELECTROCARDIOGRAM TRACING: CPT | Performed by: EMERGENCY MEDICINE

## 2025-05-06 PROCEDURE — 71260 CT THORAX DX C+: CPT

## 2025-05-06 PROCEDURE — 87637 SARSCOV2&INF A&B&RSV AMP PRB: CPT | Performed by: EMERGENCY MEDICINE

## 2025-05-06 PROCEDURE — 99285 EMERGENCY DEPT VISIT HI MDM: CPT

## 2025-05-06 PROCEDURE — 84484 ASSAY OF TROPONIN QUANT: CPT

## 2025-05-06 PROCEDURE — 71045 X-RAY EXAM CHEST 1 VIEW: CPT

## 2025-05-06 RX ORDER — BISACODYL 5 MG/1
5 TABLET, DELAYED RELEASE ORAL DAILY PRN
Status: DISCONTINUED | OUTPATIENT
Start: 2025-05-06 | End: 2025-05-09 | Stop reason: HOSPADM

## 2025-05-06 RX ORDER — ONDANSETRON 4 MG/1
4 TABLET, ORALLY DISINTEGRATING ORAL EVERY 6 HOURS PRN
Status: DISCONTINUED | OUTPATIENT
Start: 2025-05-06 | End: 2025-05-09 | Stop reason: HOSPADM

## 2025-05-06 RX ORDER — IOPAMIDOL 755 MG/ML
100 INJECTION, SOLUTION INTRAVASCULAR
Status: COMPLETED | OUTPATIENT
Start: 2025-05-06 | End: 2025-05-06

## 2025-05-06 RX ORDER — NITROGLYCERIN 0.4 MG/1
0.4 TABLET SUBLINGUAL
Status: DISCONTINUED | OUTPATIENT
Start: 2025-05-06 | End: 2025-05-09 | Stop reason: HOSPADM

## 2025-05-06 RX ORDER — ONDANSETRON 2 MG/ML
4 INJECTION INTRAMUSCULAR; INTRAVENOUS EVERY 6 HOURS PRN
Status: DISCONTINUED | OUTPATIENT
Start: 2025-05-06 | End: 2025-05-09 | Stop reason: HOSPADM

## 2025-05-06 RX ORDER — SODIUM CHLORIDE 9 MG/ML
40 INJECTION, SOLUTION INTRAVENOUS AS NEEDED
Status: DISCONTINUED | OUTPATIENT
Start: 2025-05-06 | End: 2025-05-09 | Stop reason: HOSPADM

## 2025-05-06 RX ORDER — ACETAMINOPHEN 160 MG/5ML
650 SOLUTION ORAL EVERY 4 HOURS PRN
Status: DISCONTINUED | OUTPATIENT
Start: 2025-05-06 | End: 2025-05-09 | Stop reason: HOSPADM

## 2025-05-06 RX ORDER — SODIUM CHLORIDE 0.9 % (FLUSH) 0.9 %
10 SYRINGE (ML) INJECTION EVERY 12 HOURS SCHEDULED
Status: DISCONTINUED | OUTPATIENT
Start: 2025-05-07 | End: 2025-05-09 | Stop reason: HOSPADM

## 2025-05-06 RX ORDER — SODIUM CHLORIDE 0.9 % (FLUSH) 0.9 %
10 SYRINGE (ML) INJECTION AS NEEDED
Status: DISCONTINUED | OUTPATIENT
Start: 2025-05-06 | End: 2025-05-09 | Stop reason: HOSPADM

## 2025-05-06 RX ORDER — POLYETHYLENE GLYCOL 3350 17 G/17G
17 POWDER, FOR SOLUTION ORAL DAILY PRN
Status: DISCONTINUED | OUTPATIENT
Start: 2025-05-06 | End: 2025-05-09 | Stop reason: HOSPADM

## 2025-05-06 RX ORDER — ACETAMINOPHEN 650 MG/1
650 SUPPOSITORY RECTAL EVERY 4 HOURS PRN
Status: DISCONTINUED | OUTPATIENT
Start: 2025-05-06 | End: 2025-05-09 | Stop reason: HOSPADM

## 2025-05-06 RX ORDER — BISACODYL 10 MG
10 SUPPOSITORY, RECTAL RECTAL DAILY PRN
Status: DISCONTINUED | OUTPATIENT
Start: 2025-05-06 | End: 2025-05-09 | Stop reason: HOSPADM

## 2025-05-06 RX ORDER — ACETAMINOPHEN 325 MG/1
650 TABLET ORAL EVERY 4 HOURS PRN
Status: DISCONTINUED | OUTPATIENT
Start: 2025-05-06 | End: 2025-05-09 | Stop reason: HOSPADM

## 2025-05-06 RX ORDER — SACCHAROMYCES BOULARDII 250 MG
250 CAPSULE ORAL 2 TIMES DAILY
Status: DISCONTINUED | OUTPATIENT
Start: 2025-05-07 | End: 2025-05-09 | Stop reason: HOSPADM

## 2025-05-06 RX ORDER — AMOXICILLIN 250 MG
2 CAPSULE ORAL 2 TIMES DAILY PRN
Status: DISCONTINUED | OUTPATIENT
Start: 2025-05-06 | End: 2025-05-09 | Stop reason: HOSPADM

## 2025-05-06 RX ADMIN — SODIUM CHLORIDE 1250 MG: 9 INJECTION, SOLUTION INTRAVENOUS at 22:36

## 2025-05-06 RX ADMIN — Medication 10 ML: at 23:31

## 2025-05-06 RX ADMIN — Medication 250 MG: at 23:31

## 2025-05-06 RX ADMIN — CEFEPIME 2000 MG: 2 INJECTION, POWDER, FOR SOLUTION INTRAVENOUS at 21:55

## 2025-05-06 RX ADMIN — IOPAMIDOL 70 ML: 755 INJECTION, SOLUTION INTRAVENOUS at 20:35

## 2025-05-06 NOTE — Clinical Note
Level of Care: Telemetry [5]   Diagnosis: Pneumonia [[id number]]   Certification: I Certify That Inpatient Hospital Services Are Medically Necessary For Greater Than 2 Midnights

## 2025-05-07 ENCOUNTER — APPOINTMENT (OUTPATIENT)
Dept: GENERAL RADIOLOGY | Facility: HOSPITAL | Age: 80
DRG: 168 | End: 2025-05-07
Payer: MEDICARE

## 2025-05-07 ENCOUNTER — ANESTHESIA EVENT (OUTPATIENT)
Dept: PERIOP | Facility: HOSPITAL | Age: 80
End: 2025-05-07
Payer: MEDICARE

## 2025-05-07 ENCOUNTER — ANESTHESIA (OUTPATIENT)
Dept: PERIOP | Facility: HOSPITAL | Age: 80
End: 2025-05-07
Payer: MEDICARE

## 2025-05-07 LAB
ACB CMPLX DNA BAL NAA+NON-PRB-NCNCRNG: NOT DETECTED
ALBUMIN FLD-MCNC: 2.3 G/DL
ANION GAP SERPL CALCULATED.3IONS-SCNC: 11.1 MMOL/L (ref 5–15)
APPEARANCE FLD: ABNORMAL
BASOPHILS # BLD AUTO: 0.06 10*3/MM3 (ref 0–0.2)
BASOPHILS NFR BLD AUTO: 0.8 % (ref 0–1.5)
BLACTX-M ISLT/SPM QL: NORMAL
BLAIMP ISLT/SPM QL: NORMAL
BLAKPC ISLT/SPM QL: NORMAL
BLAOXA-48-LIKE ISLT/SPM QL: NORMAL
BLAVIM ISLT/SPM QL: NORMAL
BUN SERPL-MCNC: 16 MG/DL (ref 8–23)
BUN/CREAT SERPL: 18.2 (ref 7–25)
C PNEUM DNA NPH QL NAA+NON-PROBE: NOT DETECTED
CALCIUM SPEC-SCNC: 8.4 MG/DL (ref 8.6–10.5)
CHLORIDE SERPL-SCNC: 103 MMOL/L (ref 98–107)
CILIATED BAL QL: 14 %
CO2 SERPL-SCNC: 23.9 MMOL/L (ref 22–29)
COLOR FLD: YELLOW
CREAT SERPL-MCNC: 0.88 MG/DL (ref 0.57–1)
DEPRECATED RDW RBC AUTO: 46.8 FL (ref 37–54)
E CLOAC COMP DNA BAL NAA+NON-PRB-NCNCRNG: NOT DETECTED
E COLI DNA BAL NAA+NON-PRB-NCNCRNG: NOT DETECTED
EGFRCR SERPLBLD CKD-EPI 2021: 66.1 ML/MIN/1.73
EOSINOPHIL # BLD AUTO: 0.21 10*3/MM3 (ref 0–0.4)
EOSINOPHIL NFR BLD AUTO: 2.6 % (ref 0.3–6.2)
EOSINOPHIL NFR FLD MANUAL: 2 %
ERYTHROCYTE [DISTWIDTH] IN BLOOD BY AUTOMATED COUNT: 13.9 % (ref 12.3–15.4)
FLUAV SUBTYP SPEC NAA+PROBE: NOT DETECTED
FLUBV RNA ISLT QL NAA+PROBE: NOT DETECTED
GLUCOSE FLD-MCNC: 82 MG/DL
GLUCOSE SERPL-MCNC: 91 MG/DL (ref 65–99)
GP B STREP DNA BAL NAA+NON-PRB-NCNCRNG: NOT DETECTED
GRAM STN SPEC: NORMAL
HADV DNA SPEC NAA+PROBE: NOT DETECTED
HAEM INFLU DNA BAL NAA+NON-PRB-NCNCRNG: NOT DETECTED
HCOV RNA LOWER RESP QL NAA+NON-PROBE: NOT DETECTED
HCT VFR BLD AUTO: 37.5 % (ref 34–46.6)
HGB BLD-MCNC: 12.5 G/DL (ref 12–15.9)
HMPV RNA NPH QL NAA+NON-PROBE: NOT DETECTED
HPIV RNA LOWER RESP QL NAA+NON-PROBE: NOT DETECTED
IMM GRANULOCYTES # BLD AUTO: 0.02 10*3/MM3 (ref 0–0.05)
IMM GRANULOCYTES NFR BLD AUTO: 0.3 % (ref 0–0.5)
K AEROGENES DNA BAL NAA+NON-PRB-NCNCRNG: NOT DETECTED
K OXYTOCA DNA BAL NAA+NON-PRB-NCNCRNG: NOT DETECTED
K PNEU GRP DNA BAL NAA+NON-PRB-NCNCRNG: NOT DETECTED
L PNEUMO DNA LOWER RESP QL NAA+NON-PROBE: NOT DETECTED
LDH FLD-CCNC: 234 U/L
LYMPHOCYTES # BLD AUTO: 0.93 10*3/MM3 (ref 0.7–3.1)
LYMPHOCYTES NFR BLD AUTO: 11.6 % (ref 19.6–45.3)
LYMPHOCYTES NFR FLD MANUAL: 32 %
LYMPHOCYTES NFR FLD MANUAL: 41 %
M CATARRHALIS DNA BAL NAA+NON-PRB-NCNCRNG: NOT DETECTED
M PNEUMO IGG SER IA-ACNC: NOT DETECTED
MACROPHAGE FLUID %: 34 %
MACROPHAGE FLUID %: 5 %
MCH RBC QN AUTO: 30.3 PG (ref 26.6–33)
MCHC RBC AUTO-ENTMCNC: 33.3 G/DL (ref 31.5–35.7)
MCV RBC AUTO: 91 FL (ref 79–97)
MECA+MECC ISLT/SPM QL: NORMAL
MESOTHL CELL NFR FLD MANUAL: 27 %
MONOCYTES # BLD AUTO: 0.7 10*3/MM3 (ref 0.1–0.9)
MONOCYTES NFR BLD AUTO: 8.8 % (ref 5–12)
MRSA DNA SPEC QL NAA+PROBE: NORMAL
NDM GENE: NORMAL
NEUTROPHILS NFR BLD AUTO: 6.07 10*3/MM3 (ref 1.7–7)
NEUTROPHILS NFR BLD AUTO: 75.9 % (ref 42.7–76)
NEUTROPHILS NFR FLD MANUAL: 18 %
NEUTROPHILS NFR FLD MANUAL: 27 %
NIGHT BLUE STAIN TISS: NORMAL
NRBC BLD AUTO-RTO: 0 /100 WBC (ref 0–0.2)
NUC CELL # FLD: 2348 /MM3
P AERUGINOSA DNA BAL NAA+NON-PRB-NCNCRNG: NOT DETECTED
PH FLD: 7.5 [PH]
PLATELET # BLD AUTO: 225 10*3/MM3 (ref 140–450)
PMV BLD AUTO: 10.3 FL (ref 6–12)
POTASSIUM SERPL-SCNC: 3.9 MMOL/L (ref 3.5–5.2)
PROT FLD-MCNC: 3.9 G/DL
PROTEUS SP DNA BAL NAA+NON-PRB-NCNCRNG: NOT DETECTED
RBC # BLD AUTO: 4.12 10*6/MM3 (ref 3.77–5.28)
RBC # FLD AUTO: 3000 /MM3
RHINOVIRUS RNA SPEC NAA+PROBE: NOT DETECTED
RSV RNA NPH QL NAA+NON-PROBE: NOT DETECTED
S AUREUS DNA BAL NAA+NON-PRB-NCNCRNG: NOT DETECTED
S MARCESCENS DNA BAL NAA+NON-PRB-NCNCRNG: NOT DETECTED
S PNEUM DNA BAL NAA+NON-PRB-NCNCRNG: NOT DETECTED
S PYO DNA BAL NAA+NON-PRB-NCNCRNG: NOT DETECTED
SODIUM SERPL-SCNC: 138 MMOL/L (ref 136–145)
VANCOMYCIN SERPL-MCNC: 8.15 MCG/ML (ref 5–40)
VISUAL PRESENCE OF BLOOD: NORMAL
WBC NRBC COR # BLD AUTO: 7.99 10*3/MM3 (ref 3.4–10.8)

## 2025-05-07 PROCEDURE — 83986 ASSAY PH BODY FLUID NOS: CPT | Performed by: INTERNAL MEDICINE

## 2025-05-07 PROCEDURE — 25010000002 LIDOCAINE HCL (PF) 4 % SOLUTION: Performed by: INTERNAL MEDICINE

## 2025-05-07 PROCEDURE — 87205 SMEAR GRAM STAIN: CPT | Performed by: INTERNAL MEDICINE

## 2025-05-07 PROCEDURE — 25010000002 PROPOFOL 10 MG/ML EMULSION

## 2025-05-07 PROCEDURE — 0BBG8ZX EXCISION OF LEFT UPPER LUNG LOBE, VIA NATURAL OR ARTIFICIAL OPENING ENDOSCOPIC, DIAGNOSTIC: ICD-10-PCS | Performed by: INTERNAL MEDICINE

## 2025-05-07 PROCEDURE — 88342 IMHCHEM/IMCYTCHM 1ST ANTB: CPT | Performed by: INTERNAL MEDICINE

## 2025-05-07 PROCEDURE — 84157 ASSAY OF PROTEIN OTHER: CPT | Performed by: INTERNAL MEDICINE

## 2025-05-07 PROCEDURE — 25810000003 LACTATED RINGERS PER 1000 ML: Performed by: ANESTHESIOLOGY

## 2025-05-07 PROCEDURE — 89051 BODY FLUID CELL COUNT: CPT | Performed by: INTERNAL MEDICINE

## 2025-05-07 PROCEDURE — 88305 TISSUE EXAM BY PATHOLOGIST: CPT | Performed by: INTERNAL MEDICINE

## 2025-05-07 PROCEDURE — 32555 ASPIRATE PLEURA W/ IMAGING: CPT | Performed by: INTERNAL MEDICINE

## 2025-05-07 PROCEDURE — 0B9G8ZX DRAINAGE OF LEFT UPPER LUNG LOBE, VIA NATURAL OR ARTIFICIAL OPENING ENDOSCOPIC, DIAGNOSTIC: ICD-10-PCS | Performed by: INTERNAL MEDICINE

## 2025-05-07 PROCEDURE — 87075 CULTR BACTERIA EXCEPT BLOOD: CPT | Performed by: INTERNAL MEDICINE

## 2025-05-07 PROCEDURE — 99223 1ST HOSP IP/OBS HIGH 75: CPT | Performed by: INTERNAL MEDICINE

## 2025-05-07 PROCEDURE — 87070 CULTURE OTHR SPECIMN AEROBIC: CPT | Performed by: INTERNAL MEDICINE

## 2025-05-07 PROCEDURE — 80202 ASSAY OF VANCOMYCIN: CPT | Performed by: STUDENT IN AN ORGANIZED HEALTH CARE EDUCATION/TRAINING PROGRAM

## 2025-05-07 PROCEDURE — 31624 DX BRONCHOSCOPE/LAVAGE: CPT | Performed by: INTERNAL MEDICINE

## 2025-05-07 PROCEDURE — 87116 MYCOBACTERIA CULTURE: CPT | Performed by: INTERNAL MEDICINE

## 2025-05-07 PROCEDURE — 94761 N-INVAS EAR/PLS OXIMETRY MLT: CPT

## 2025-05-07 PROCEDURE — 76604 US EXAM CHEST: CPT | Performed by: INTERNAL MEDICINE

## 2025-05-07 PROCEDURE — 87633 RESP VIRUS 12-25 TARGETS: CPT | Performed by: INTERNAL MEDICINE

## 2025-05-07 PROCEDURE — 83615 LACTATE (LD) (LDH) ENZYME: CPT | Performed by: INTERNAL MEDICINE

## 2025-05-07 PROCEDURE — 0BDG8ZX EXTRACTION OF LEFT UPPER LUNG LOBE, VIA NATURAL OR ARTIFICIAL OPENING ENDOSCOPIC, DIAGNOSTIC: ICD-10-PCS | Performed by: INTERNAL MEDICINE

## 2025-05-07 PROCEDURE — 85025 COMPLETE CBC W/AUTO DIFF WBC: CPT | Performed by: STUDENT IN AN ORGANIZED HEALTH CARE EDUCATION/TRAINING PROGRAM

## 2025-05-07 PROCEDURE — 25010000002 FENTANYL CITRATE (PF) 50 MCG/ML SOLUTION

## 2025-05-07 PROCEDURE — 71045 X-RAY EXAM CHEST 1 VIEW: CPT

## 2025-05-07 PROCEDURE — 0W9B3ZZ DRAINAGE OF LEFT PLEURAL CAVITY, PERCUTANEOUS APPROACH: ICD-10-PCS | Performed by: INTERNAL MEDICINE

## 2025-05-07 PROCEDURE — 87015 SPECIMEN INFECT AGNT CONCNTJ: CPT | Performed by: INTERNAL MEDICINE

## 2025-05-07 PROCEDURE — 99232 SBSQ HOSP IP/OBS MODERATE 35: CPT | Performed by: STUDENT IN AN ORGANIZED HEALTH CARE EDUCATION/TRAINING PROGRAM

## 2025-05-07 PROCEDURE — 25010000002 LIDOCAINE PF 2% 2 % SOLUTION

## 2025-05-07 PROCEDURE — 25810000003 SODIUM CHLORIDE 0.9 % SOLUTION: Performed by: STUDENT IN AN ORGANIZED HEALTH CARE EDUCATION/TRAINING PROGRAM

## 2025-05-07 PROCEDURE — 87102 FUNGUS ISOLATION CULTURE: CPT | Performed by: INTERNAL MEDICINE

## 2025-05-07 PROCEDURE — 25010000002 VANCOMYCIN 5 G RECONSTITUTED SOLUTION: Performed by: STUDENT IN AN ORGANIZED HEALTH CARE EDUCATION/TRAINING PROGRAM

## 2025-05-07 PROCEDURE — 31623 DX BRONCHOSCOPE/BRUSH: CPT | Performed by: INTERNAL MEDICINE

## 2025-05-07 PROCEDURE — 88341 IMHCHEM/IMCYTCHM EA ADD ANTB: CPT | Performed by: INTERNAL MEDICINE

## 2025-05-07 PROCEDURE — 31628 BRONCHOSCOPY/LUNG BX EACH: CPT | Performed by: INTERNAL MEDICINE

## 2025-05-07 PROCEDURE — 82945 GLUCOSE OTHER FLUID: CPT | Performed by: INTERNAL MEDICINE

## 2025-05-07 PROCEDURE — 94799 UNLISTED PULMONARY SVC/PX: CPT

## 2025-05-07 PROCEDURE — 25010000002 CEFEPIME PER 500 MG: Performed by: STUDENT IN AN ORGANIZED HEALTH CARE EDUCATION/TRAINING PROGRAM

## 2025-05-07 PROCEDURE — 82042 OTHER SOURCE ALBUMIN QUAN EA: CPT | Performed by: INTERNAL MEDICINE

## 2025-05-07 PROCEDURE — 25010000002 CEFEPIME PER 500 MG: Performed by: INTERNAL MEDICINE

## 2025-05-07 PROCEDURE — 87071 CULTURE AEROBIC QUANT OTHER: CPT | Performed by: INTERNAL MEDICINE

## 2025-05-07 PROCEDURE — 88104 CYTOPATH FL NONGYN SMEARS: CPT | Performed by: INTERNAL MEDICINE

## 2025-05-07 PROCEDURE — 25010000002 HYDROMORPHONE 1 MG/ML SOLUTION

## 2025-05-07 PROCEDURE — 87206 SMEAR FLUORESCENT/ACID STAI: CPT | Performed by: INTERNAL MEDICINE

## 2025-05-07 PROCEDURE — 80048 BASIC METABOLIC PNL TOTAL CA: CPT | Performed by: STUDENT IN AN ORGANIZED HEALTH CARE EDUCATION/TRAINING PROGRAM

## 2025-05-07 PROCEDURE — 88108 CYTOPATH CONCENTRATE TECH: CPT | Performed by: INTERNAL MEDICINE

## 2025-05-07 RX ORDER — IPRATROPIUM BROMIDE AND ALBUTEROL SULFATE 2.5; .5 MG/3ML; MG/3ML
3 SOLUTION RESPIRATORY (INHALATION) ONCE
Status: COMPLETED | OUTPATIENT
Start: 2025-05-07 | End: 2025-05-07

## 2025-05-07 RX ORDER — LIDOCAINE HYDROCHLORIDE 20 MG/ML
INJECTION, SOLUTION EPIDURAL; INFILTRATION; INTRACAUDAL; PERINEURAL AS NEEDED
Status: DISCONTINUED | OUTPATIENT
Start: 2025-05-07 | End: 2025-05-07 | Stop reason: SURG

## 2025-05-07 RX ORDER — OXYCODONE HYDROCHLORIDE 5 MG/1
5 TABLET ORAL
Status: DISCONTINUED | OUTPATIENT
Start: 2025-05-07 | End: 2025-05-07 | Stop reason: HOSPADM

## 2025-05-07 RX ORDER — LEVOTHYROXINE SODIUM 50 UG/1
50 TABLET ORAL EVERY MORNING
Status: DISCONTINUED | OUTPATIENT
Start: 2025-05-07 | End: 2025-05-09 | Stop reason: HOSPADM

## 2025-05-07 RX ORDER — AMITRIPTYLINE HYDROCHLORIDE 10 MG/1
10 TABLET ORAL NIGHTLY
Status: DISCONTINUED | OUTPATIENT
Start: 2025-05-07 | End: 2025-05-09 | Stop reason: HOSPADM

## 2025-05-07 RX ORDER — FENTANYL CITRATE 50 UG/ML
INJECTION, SOLUTION INTRAMUSCULAR; INTRAVENOUS AS NEEDED
Status: DISCONTINUED | OUTPATIENT
Start: 2025-05-07 | End: 2025-05-07 | Stop reason: SURG

## 2025-05-07 RX ORDER — SODIUM CHLORIDE, SODIUM LACTATE, POTASSIUM CHLORIDE, CALCIUM CHLORIDE 600; 310; 30; 20 MG/100ML; MG/100ML; MG/100ML; MG/100ML
9 INJECTION, SOLUTION INTRAVENOUS CONTINUOUS PRN
Status: DISCONTINUED | OUTPATIENT
Start: 2025-05-07 | End: 2025-05-07 | Stop reason: HOSPADM

## 2025-05-07 RX ORDER — ATORVASTATIN CALCIUM 20 MG/1
20 TABLET, FILM COATED ORAL NIGHTLY
Status: DISCONTINUED | OUTPATIENT
Start: 2025-05-07 | End: 2025-05-09 | Stop reason: HOSPADM

## 2025-05-07 RX ORDER — LIDOCAINE HYDROCHLORIDE 40 MG/ML
INJECTION, SOLUTION RETROBULBAR AS NEEDED
Status: DISCONTINUED | OUTPATIENT
Start: 2025-05-07 | End: 2025-05-07 | Stop reason: HOSPADM

## 2025-05-07 RX ORDER — PROMETHAZINE HYDROCHLORIDE 25 MG/1
25 SUPPOSITORY RECTAL ONCE AS NEEDED
Status: DISCONTINUED | OUTPATIENT
Start: 2025-05-07 | End: 2025-05-07 | Stop reason: HOSPADM

## 2025-05-07 RX ORDER — HYDROCODONE POLISTIREX AND CHLORPHENIRAMINE POLISTIREX 10; 8 MG/5ML; MG/5ML
2.5 SUSPENSION, EXTENDED RELEASE ORAL EVERY 12 HOURS PRN
Refills: 0 | Status: DISCONTINUED | OUTPATIENT
Start: 2025-05-07 | End: 2025-05-09 | Stop reason: HOSPADM

## 2025-05-07 RX ORDER — PROPOFOL 10 MG/ML
VIAL (ML) INTRAVENOUS AS NEEDED
Status: DISCONTINUED | OUTPATIENT
Start: 2025-05-07 | End: 2025-05-07 | Stop reason: SURG

## 2025-05-07 RX ORDER — TRAZODONE HYDROCHLORIDE 50 MG/1
50 TABLET ORAL NIGHTLY
Status: DISCONTINUED | OUTPATIENT
Start: 2025-05-07 | End: 2025-05-09 | Stop reason: HOSPADM

## 2025-05-07 RX ORDER — PROMETHAZINE HYDROCHLORIDE 25 MG/1
25 TABLET ORAL ONCE AS NEEDED
Status: DISCONTINUED | OUTPATIENT
Start: 2025-05-07 | End: 2025-05-07 | Stop reason: HOSPADM

## 2025-05-07 RX ORDER — ACETAMINOPHEN 500 MG
1000 TABLET ORAL ONCE
Status: DISCONTINUED | OUTPATIENT
Start: 2025-05-07 | End: 2025-05-07 | Stop reason: SDUPTHER

## 2025-05-07 RX ORDER — ACETAMINOPHEN 500 MG
1000 TABLET ORAL ONCE
Status: COMPLETED | OUTPATIENT
Start: 2025-05-07 | End: 2025-05-07

## 2025-05-07 RX ORDER — ONDANSETRON 2 MG/ML
4 INJECTION INTRAMUSCULAR; INTRAVENOUS ONCE AS NEEDED
Status: DISCONTINUED | OUTPATIENT
Start: 2025-05-07 | End: 2025-05-07 | Stop reason: HOSPADM

## 2025-05-07 RX ADMIN — SODIUM CHLORIDE, POTASSIUM CHLORIDE, SODIUM LACTATE AND CALCIUM CHLORIDE 9 ML/HR: 600; 310; 30; 20 INJECTION, SOLUTION INTRAVENOUS at 10:46

## 2025-05-07 RX ADMIN — Medication 250 MG: at 20:33

## 2025-05-07 RX ADMIN — FENTANYL CITRATE 25 MCG: 50 INJECTION, SOLUTION INTRAMUSCULAR; INTRAVENOUS at 11:51

## 2025-05-07 RX ADMIN — Medication 10 ML: at 09:57

## 2025-05-07 RX ADMIN — SODIUM CHLORIDE 1250 MG: 9 INJECTION, SOLUTION INTRAVENOUS at 18:33

## 2025-05-07 RX ADMIN — CEFEPIME 2000 MG: 2 INJECTION, POWDER, FOR SOLUTION INTRAVENOUS at 09:57

## 2025-05-07 RX ADMIN — TRAZODONE HYDROCHLORIDE 50 MG: 50 TABLET ORAL at 20:33

## 2025-05-07 RX ADMIN — PROPOFOL 30 MG: 10 INJECTION, EMULSION INTRAVENOUS at 11:44

## 2025-05-07 RX ADMIN — FENTANYL CITRATE 25 MCG: 50 INJECTION, SOLUTION INTRAMUSCULAR; INTRAVENOUS at 11:44

## 2025-05-07 RX ADMIN — LIDOCAINE HYDROCHLORIDE 40 MG: 20 INJECTION, SOLUTION EPIDURAL; INFILTRATION; INTRACAUDAL; PERINEURAL at 11:39

## 2025-05-07 RX ADMIN — HYDROMORPHONE HYDROCHLORIDE 0.25 MG: 1 INJECTION, SOLUTION INTRAMUSCULAR; INTRAVENOUS; SUBCUTANEOUS at 12:38

## 2025-05-07 RX ADMIN — PROPOFOL 150 MCG/KG/MIN: 10 INJECTION, EMULSION INTRAVENOUS at 11:40

## 2025-05-07 RX ADMIN — LEVOTHYROXINE SODIUM 50 MCG: 0.05 TABLET ORAL at 16:23

## 2025-05-07 RX ADMIN — ATORVASTATIN CALCIUM 20 MG: 20 TABLET, FILM COATED ORAL at 20:33

## 2025-05-07 RX ADMIN — CEFEPIME 2000 MG: 2 INJECTION, POWDER, FOR SOLUTION INTRAVENOUS at 22:14

## 2025-05-07 RX ADMIN — Medication 10 ML: at 20:36

## 2025-05-07 RX ADMIN — ACETAMINOPHEN 1000 MG: 500 TABLET ORAL at 10:56

## 2025-05-07 RX ADMIN — AMITRIPTYLINE HYDROCHLORIDE 10 MG: 10 TABLET, FILM COATED ORAL at 20:34

## 2025-05-07 RX ADMIN — IPRATROPIUM BROMIDE AND ALBUTEROL SULFATE 3 ML: .5; 3 SOLUTION RESPIRATORY (INHALATION) at 10:56

## 2025-05-07 RX ADMIN — HYDROMORPHONE HYDROCHLORIDE 0.25 MG: 1 INJECTION, SOLUTION INTRAMUSCULAR; INTRAVENOUS; SUBCUTANEOUS at 12:26

## 2025-05-07 RX ADMIN — FENTANYL CITRATE 50 MCG: 50 INJECTION, SOLUTION INTRAMUSCULAR; INTRAVENOUS at 11:25

## 2025-05-07 RX ADMIN — PROPOFOL 30 MG: 10 INJECTION, EMULSION INTRAVENOUS at 11:39

## 2025-05-07 RX ADMIN — HYDROCODONE POLISTIREX AND CHLORPHENIRAMINE POLISTIREX 2.5 ML: 10; 8 SUSPENSION, EXTENDED RELEASE ORAL at 20:34

## 2025-05-07 NOTE — CONSULTS
Pulmonary / Critical Care Consult Note      Patient Name: Nancie Grissom  : 1945  MRN: [id number]  Primary Care Physician:  Wen Leblanc MD  Referring Physician: No ref. provider found  Date of admission: 2025    Subjective   Subjective     Reason for Consult/ Chief Complaint: Pneumonia from unknown organism, left pleural effusion    HPI:  Nancie Grissom is a 79 y.o. female with past medical history of recent pneumonia presented to the ED for evaluation of worsening shortness of breath and continuous cough.  In the ED, labs were unremarkable.  Chest CT was obtained demonstrating masslike consolidation in the left upper lobe and a moderate size left pleural effusion.  The service was consulted to assist in management.  Patient's acute issues.  Upon assessment, patient lying in bed on room air no apparent short distress. Findings plan were discussed with the patient and family members at bedside.  Patient reported that she was recently treated for pneumonia with antibiotics.  However, patient reported that her cough and shortness of breath continue to carry on.  Patient currently denies any chest pain or chest tightness, fever or chills, nausea or vomiting.  Patient denies any family history of lung cancer or lung disease.  Patient denies being on any anticoagulation        Personal History     Past Medical History:   Diagnosis Date    Arthritis     Depression     GERD (gastroesophageal reflux disease) 2015    Hyperlipidemia     Hypertension     Hypothyroidism     Osteoporosis     Vitamin D deficiency        Past Surgical History:   Procedure Laterality Date    ROTATOR CUFF REPAIR Right        Family History: No known family history of chronic lung disease.     Social History:  reports that she has never smoked. She has never used smokeless tobacco. She reports current alcohol use. She reports that she does not use drugs.    Home Medications:  Diclofenac Sodium, albuterol sulfate HFA, amitriptyline,  atorvastatin, hydroCHLOROthiazide, levothyroxine, lisinopril, pantoprazole, and traZODone    Allergies:  Allergies   Allergen Reactions    Methotrexate Other (See Comments)     Pt caused hair loss        Objective    Objective     Vitals:   Temp:  [98.2 °F (36.8 °C)-98.6 °F (37 °C)] 98.2 °F (36.8 °C)  Heart Rate:  [80-93] 92  Resp:  [16-21] 16  BP: (103-124)/(70-82) 120/76    Physical Exam:  Vital Signs Reviewed   General:  WDWN, Alert, NAD.  Pleasant elderly female, lying in bed  HEENT:  PERRL, EOMI.  OP, nares clear, no sinus tenderness  Neck:  Supple, no JVD, no thyromegaly  Lymph: no axillary, cervical, supraclavicular lymphadenopathy noted bilaterally  Chest:  good aeration, diminished breath sounds on the left with Velcro-like crackles and scattered rhonchi, no work of breathing on room air  CV: RRR, no MGR, pulses 2+, equal.  Abd:  Soft, NT, ND, + BS, no HSM  EXT:  no clubbing, no cyanosis, no edema, no joint tenderness  Neuro:  A&Ox3, CN grossly intact, no focal deficits.  Skin: No rashes or lesions noted      Result Review    Result Review:  I have personally reviewed the results from the time of this admission to 5/7/2025 09:23 EDT and agree with these findings:  [x]  Laboratory  [x]  Microbiology  [x]  Radiology  [x]  EKG/Telemetry   [x]  Cardiology/Vascular   []  Pathology  []  Old records  []  Other:  Most notable findings include:         Lab 05/07/25  0537 05/06/25  1627   WBC 7.99 11.21*   HEMOGLOBIN 12.5 14.0   HEMATOCRIT 37.5 41.6   PLATELETS 225 309   SODIUM 138 137   POTASSIUM 3.9 4.2   CHLORIDE 103 101   CO2 23.9 24.4   BUN 16 17   CREATININE 0.88 1.08*   GLUCOSE 91 106*   CALCIUM 8.4* 8.9   TOTAL PROTEIN  --  7.0   ALBUMIN  --  3.8   GLOBULIN  --  3.2     CT Chest With Contrast Diagnostic  Result Date: 5/6/2025  CT CHEST W CONTRAST DIAGNOSTIC Date of Exam: 5/6/2025 8:32 PM EDT Indication: Persistent left lower lobe infiltrate for 5 weeks shortness of breath. Comparison: Chest x-ray 5/6/2025,  4/23/2025 Technique: Axial CT images were obtained of the chest after the uneventful intravenous administration of iodinated contrast.  Reconstructed coronal and sagittal images were also obtained. Automated exposure control and iterative construction methods were  used. Findings: Hilum and Mediastinum: There is a 11 mm short axis prevascular lymph node on image #90. Subcarinal lymph node measures 10 mm in short axis. Mild coronary artery atherosclerotic calcification. Heart size normal. No pericardial effusion. 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 No pulmonary artery emboli on this exam. Lung Parenchyma and Pleura: There is consolidation in the left upper lobe with masslike appearance and septal thickening. There is complete opacification of the lingula. There is narrowing and nonopacification of the lingula airways. There is diffuse bronchial wall thickening. There is septal thickening and small centrilobular nodules in the left lower lobe. There is a moderate size left pleural effusion. The right lung is clear. Upper Abdomen: Enhancing mass at the right posterior hepatic lobe on image 3 and 81 measures 11 mm. Soft tissues: There is a round mass in the lateral left breast. It measures 1.5 x 1.5 cm it is in the upper outer aspect, reference image 76. Osseous structures: Osteopenia. Small sclerotic foci at the superior T10 and superior T11 vertebral body. The T10 sclerotic focus measures about 5 mm. The T11 measures about 4 mm.     Impression: 1.There is masslike consolidation in the left upper lobe and lingula with septal thickening and centrilobular nodules in the left lower lobe. There is a moderate size left pleural effusion. Findings are suspicious for pneumonia. Underlying malignancy cannot be excluded. Recommend bronchoscopy directed towards the left upper lobe or lingula for evaluation. 2.There is a mass in the upper outer left breast measuring up to 1.5 cm. Diagnostic mammogram and ultrasound is  recommended. 3.There is an enhancing mass in the right posterior hepatic lobe measuring 11 mm. This could be seen with a flash filling hemangioma. Metastatic disease cannot be excluded. 4.There are small sclerotic foci in the T10 and T11 vertebral bodies. These could be seen with bone islands. Metastatic disease cannot be excluded. 5.Large hernia with stomach above the diaphragm. 6.Mildly enlarged mediastinal lymph nodes could be reactive or metastatic. 7.PET/CT may be helpful for better evaluation. 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 The patient recognizes these findings, acknowledges these findings and is agreeable to the procedure.  -CT chest reviewed with left pleural effusion discussed proceding forward with thoracentesis. Discussed the risks of the procedure with the patient including pneumothorax, hemothorax, bleeding, hypoxia and death. The patient recognizes these findings, acknowledges these findings and is agreeable to the procedure.  Obtained consent for left sided thoracentesis 5/7/25.  Will plan thoracentesis at the same time.  Risks and benefits of thoracentesis  was discussed in detail as well.  Alternatives and options were discussed.  Patient's agreeable.  - Procedural CXR pending  - Continue cefepime and vancomycin for now.  De-escalate based on cultures  - Encourage activity as tolerated and incentive spirometer use    Electronically signed by LARRY Araya, 05/07/25, 9:47 AM EDT.  Electronically signed by Raleigh Tompkins MD, 5/7/2025, 09:23 EDT.    I have reviewed labs, imaging, pertinent clinical data and provider notes.   I have discussed with bedside nurse and primary service.     This visit was performed by BOTH a physician and an APC. I personally evaluated and examined the patient. I performed all aspects of MDM as documented. , I have reviewed and confirmed the accuracy of the patient's history as documented in this note., and I have reexamined the patient and the results are consistent with the previously documented exam. I have updated the documentation as necessary.     Electronically signed by Raleigh Tompkins MD, 05/07/25, 9:57 AM EDT.

## 2025-05-07 NOTE — ED PROVIDER NOTES
Time: 8:49 PM EDT  Date of encounter:  5/6/2025  Independent Historian/Clinical History and Information was obtained by:   Patient and Family  Chief Complaint: Cough and shortness of breath    History is limited by: N/A    History of Present Illness:  Patient is a 79 y.o. year old female who presents to the emergency department for evaluation of cough and shortness of breath.  The patient and family first note that the patient began having upper respiratory symptoms about 5 weeks ago..  She was seen at [facility] and placed on Augmentin and diagnosed with a left lower lobe pneumonia.  She states that shortly after that she developed diarrhea.  Her doctor tested her stool in the office and she was diagnosed with C. difficile colitis.  She states that she was treated with antibiotics.  She is unsure the name.  However the diarrhea is improved.  Patient notes that after the Augmentin she continued to have cough and shortness of breath.  Her doctor auscultated her lungs and felt she still had pneumonia was placed on Zithromax.  She really did not improve on the Zithromax.  Show she went to the office for Rocephin injections for 5 days.  Her last injection was essentially last Friday, 4 days ago.  She states that she is no better.  She still has shortness of breath and cough.  She also feels fatigued and weak.  Her primary care physician encouraged her to come to the emergency department..  She denies any chest pain.  She has had no fever or rigors.  She does have some myalgias.  She has had no nausea or vomiting.  She has no chest pain or abdominal pain.  She has no pain or swelling in the legs.  She denies a history of smoking    HPI    Patient Care Team  Primary Care Provider: Wen Leblanc MD    Past Medical History:     Allergies   Allergen Reactions    Methotrexate Other (See Comments)     Pt caused hair loss      Past Medical History:   Diagnosis Date    Arthritis     Depression     GERD (gastroesophageal  reflux disease) 02/12/2015    Hyperlipidemia     Hypertension     Hypothyroidism     Osteoporosis     Vitamin D deficiency      Past Surgical History:   Procedure Laterality Date    ROTATOR CUFF REPAIR Right      History reviewed. No pertinent family history.    Home Medications:  Prior to Admission medications    Medication Sig Start Date End Date Taking? Authorizing Provider   albuterol sulfate  (90 Base) MCG/ACT inhaler Inhale 2 puffs Every 4 (Four) Hours As Needed for Wheezing. 4/22/25   Wen Leblanc MD   amitriptyline (ELAVIL) 10 MG tablet Take 1 tablet by mouth Every Night. 4/22/25   Wen Leblanc MD   atorvastatin (LIPITOR) 20 MG tablet Take 1 tablet by mouth Every Night. 4/22/25   Wen Leblanc MD   benzonatate (Tessalon Perles) 100 MG capsule Take 1 capsule by mouth 3 (Three) Times a Day As Needed for Cough. 4/22/25   Wen Leblanc MD   Diclofenac Sodium (VOLTAREN) 1 % gel gel Apply  topically to the appropriate area as directed 4 (Four) Times a Day. 4/22/25   Wen Leblanc MD   fidaxomicin (DIFICID) 200 MG tablet Take 1 tablet by mouth 2 (Two) Times a Day. 4/24/25   Wen Leblanc MD   hydroCHLOROthiazide 25 MG tablet Take 1 tablet by mouth Daily. 4/22/25   Wen Leblanc MD   levoFLOXacin (Levaquin) 750 MG tablet Take 1 tablet by mouth Daily. 4/28/25   Wen Leblanc MD   levothyroxine (Synthroid) 50 MCG tablet Take 1 tablet by mouth Daily. 4/22/25   Wen Leblanc MD   lisinopril (PRINIVIL,ZESTRIL) 10 MG tablet Take 1 tablet by mouth Daily. 4/22/25   Wen Leblanc MD   pantoprazole (Protonix) 40 MG EC tablet Take 1 tablet by mouth Daily. 4/22/25   Wen Leblanc MD   traZODone (DESYREL) 50 MG tablet Take 1 tablet by mouth Every Night. 4/22/25   Wen Leblanc MD   folic acid (FOLVITE) 1 MG tablet TAKE 1 TABLET BY MOUTH EVERY DAY for 90  Patient not taking: Reported on 4/22/2025 5/6/25  Provider, MD Karine        Social History:   Social History  "    Tobacco Use    Smoking status: Never    Smokeless tobacco: Never   Vaping Use    Vaping status: Never Used   Substance Use Topics    Alcohol use: Yes     Comment: rarely    Drug use: Never         Review of Systems:  Review of Systems   Constitutional:  Positive for fatigue. Negative for chills, diaphoresis and fever.   HENT:  Negative for congestion, postnasal drip, rhinorrhea and sore throat.    Eyes:  Negative for photophobia.   Respiratory:  Positive for cough and shortness of breath. Negative for chest tightness.    Cardiovascular:  Negative for chest pain, palpitations and leg swelling.   Gastrointestinal:  Negative for abdominal pain, diarrhea, nausea and vomiting.   Genitourinary:  Negative for difficulty urinating, dysuria, flank pain, frequency, hematuria and urgency.   Musculoskeletal:  Negative for neck pain and neck stiffness.   Skin:  Negative for pallor and rash.   Neurological:  Positive for weakness. Negative for dizziness, syncope, numbness and headaches.   Hematological:  Negative for adenopathy. Does not bruise/bleed easily.   Psychiatric/Behavioral: Negative.          Physical Exam:  /70   Pulse 89   Temp 98.4 °F (36.9 °C) (Oral)   Resp 16   Ht 154.9 cm (60.98\")   Wt 67.7 kg (149 lb 4 oz)   SpO2 93%   BMI 28.22 kg/m²     Physical Exam  Vitals and nursing note reviewed.   Constitutional:       General: She is not in acute distress.     Appearance: Normal appearance. She is not ill-appearing, toxic-appearing or diaphoretic.   HENT:      Head: Normocephalic and atraumatic.      Mouth/Throat:      Mouth: Mucous membranes are moist.   Eyes:      Pupils: Pupils are equal, round, and reactive to light.   Cardiovascular:      Rate and Rhythm: Normal rate and regular rhythm.      Pulses: Normal pulses.           Carotid pulses are 2+ on the right side and 2+ on the left side.       Radial pulses are 2+ on the right side and 2+ on the left side.        Femoral pulses are 2+ on the right " side and 2+ on the left side.       Popliteal pulses are 2+ on the right side and 2+ on the left side.        Dorsalis pedis pulses are 2+ on the right side and 2+ on the left side.        Posterior tibial pulses are 2+ on the right side and 2+ on the left side.      Heart sounds: Normal heart sounds. No murmur heard.  Pulmonary:      Effort: Pulmonary effort is normal. No accessory muscle usage, respiratory distress or retractions.      Breath sounds: Examination of the left-middle field reveals rhonchi. Examination of the left-lower field reveals rhonchi. Rhonchi present. No wheezing or rales.   Abdominal:      General: Abdomen is flat. There is no distension.      Palpations: Abdomen is soft. There is no mass or pulsatile mass.      Tenderness: There is no abdominal tenderness. There is no right CVA tenderness, left CVA tenderness, guarding or rebound.      Comments: No rigidity   Musculoskeletal:         General: No swelling, tenderness or deformity.      Cervical back: Neck supple. No tenderness.      Right lower leg: No edema.      Left lower leg: No edema.   Skin:     General: Skin is warm and dry.      Capillary Refill: Capillary refill takes less than 2 seconds.      Coloration: Skin is not jaundiced or pale.      Findings: No erythema.   Neurological:      General: No focal deficit present.      Mental Status: She is alert and oriented to person, place, and time. Mental status is at baseline.      Cranial Nerves: Cranial nerves 2-12 are intact. No cranial nerve deficit.      Sensory: Sensation is intact. No sensory deficit.      Motor: Motor function is intact. No weakness or pronator drift.      Coordination: Coordination is intact. Coordination normal.   Psychiatric:         Mood and Affect: Mood normal.         Behavior: Behavior normal.                  Procedures:  Procedures      Medical Decision Making:      Comorbidities that affect care:    Depression, GERD, hyperlipidemia, hypertension,  hypothyroid, arthritis    External Notes reviewed:    None      The following orders were placed and all results were independently analyzed by me:  Orders Placed This Encounter   Procedures    COVID-19, FLU A/B, RSV PCR 1 HR TAT - Swab, Nasopharynx    Respiratory Panel PCR w/COVID-19(SARS-CoV-2) [facility] In-House, NP Swab in UTM/VTM, 2 HR TAT - Swab, Nasopharynx    Blood Culture - Blood,    Blood Culture - Blood,    XR Chest 1 View    CT Chest With Contrast Diagnostic    [place] Draw    Comprehensive Metabolic Panel    High Sensitivity Troponin T    Magnesium    Urinalysis With Microscopic If Indicated (No Culture) - Urine, Clean Catch    CBC Auto Differential    High Sensitivity Troponin T 1Hr    Urinalysis, Microscopic Only - Urine, Clean Catch    Mycoplasma Pneu. IgG / IgM Antibodies    Procalcitonin    NPO Diet NPO Type: Strict NPO    Undress & Gown    Continuous Pulse Oximetry    Vital Signs    Orthostatic Blood Pressure    Code Status and Medical Interventions: CPR (Attempt to Resuscitate); Full Support    General MD Inpatient Consult    Inpatient Hospitalist Consult    Oxygen Therapy- Nasal Cannula; Titrate 1-6 LPM Per SpO2; 90 - 95%    POC Glucose Once    ECG 12 Lead Other; Weakness    Insert Peripheral IV    Inpatient Admission    Fall Precautions    CBC & Differential    Green Top (Gel)    Lavender Top    Gold Top - SST    Light Blue Top       Medications Given in the Emergency Department:  Medications   sodium chloride 0.9 % flush 10 mL (has no administration in time range)   vancomycin 1250 mg/250 mL 0.9% NS IVPB ([facility]) (has no administration in time range)   cefepime 2000 mg IVPB in 100 mL NS (VTB) (has no administration in time range)   iopamidol (ISOVUE-370) 76 % injection 100 mL (70 mL Intravenous Given 5/6/25 2035)        ED Course:         Labs:    Lab Results (last 24 hours)       Procedure Component Value Units Date/Time    CBC & Differential [[id number]]  (Abnormal) Collected:  05/06/25 1627    Specimen: Blood from Arm, Right Updated: 05/06/25 1639    Narrative:      The following orders were created for panel order CBC & Differential.  Procedure                               Abnormality         Status                     ---------                               -----------         ------                     CBC Auto Differential[[id number]]        Abnormal            Final result                 Please view results for these tests on the individual orders.    Comprehensive Metabolic Panel [[id number]]  (Abnormal) Collected: 05/06/25 1627    Specimen: Blood from Arm, Right Updated: 05/06/25 1708     Glucose 106 mg/dL      BUN 17 mg/dL      Creatinine 1.08 mg/dL      Sodium 137 mmol/L      Potassium 4.2 mmol/L      Chloride 101 mmol/L      CO2 24.4 mmol/L      Calcium 8.9 mg/dL      Total Protein 7.0 g/dL      Albumin 3.8 g/dL      ALT (SGPT) 11 U/L      AST (SGOT) 21 U/L      Alkaline Phosphatase 72 U/L      Total Bilirubin 0.4 mg/dL      Globulin 3.2 gm/dL      A/G Ratio 1.2 g/dL      BUN/Creatinine Ratio 15.7     Anion Gap 11.6 mmol/L      eGFR 51.7 mL/min/1.73     Narrative:      GFR Categories in Chronic Kidney Disease (CKD)              GFR Category          GFR (mL/min/1.73)    Interpretation  G1                    90 or greater        Normal or high (1)  G2                    60-89                Mild decrease (1)  G3a                   45-59                Mild to moderate decrease  G3b                   30-44                Moderate to severe decrease  G4                    15-29                Severe decrease  G5                    14 or less           Kidney failure    (1)In the absence of evidence of kidney disease, neither GFR category G1 or G2 fulfill the criteria for CKD.    eGFR calculation 2021 CKD-EPI creatinine equation, which does not include race as a factor    High Sensitivity Troponin T [[id number]]  (Abnormal) Collected: 05/06/25 1627    Specimen: Blood from Arm, Right  Updated: 05/06/25 1708     HS Troponin T 19 ng/L     Narrative:      High Sensitive Troponin T Reference Range:  <14.0 ng/L- Negative Female for AMI  <22.0 ng/L- Negative Male for AMI  >=14 - Abnormal Female indicating possible myocardial injury.  >=22 - Abnormal Male indicating possible myocardial injury.   Clinicians would have to utilize clinical acumen, EKG, Troponin, and serial changes to determine if it is an Acute Myocardial Infarction or myocardial injury due to an underlying chronic condition.         Magnesium [[id number]]  (Normal) Collected: 05/06/25 1627    Specimen: Blood from Arm, Right Updated: 05/06/25 1708     Magnesium 2.2 mg/dL     CBC Auto Differential [[id number]]  (Abnormal) Collected: 05/06/25 1627    Specimen: Blood from Arm, Right Updated: 05/06/25 1639     WBC 11.21 10*3/mm3      RBC 4.54 10*6/mm3      Hemoglobin 14.0 g/dL      Hematocrit 41.6 %      MCV 91.6 fL      MCH 30.8 pg      MCHC 33.7 g/dL      RDW 14.0 %      RDW-SD 47.1 fl      MPV 10.3 fL      Platelets 309 10*3/mm3      Neutrophil % 83.6 %      Lymphocyte % 8.5 %      Monocyte % 5.3 %      Eosinophil % 1.7 %      Basophil % 0.5 %      Immature Grans % 0.4 %      Neutrophils, Absolute 9.38 10*3/mm3      Lymphocytes, Absolute 0.95 10*3/mm3      Monocytes, Absolute 0.59 10*3/mm3      Eosinophils, Absolute 0.19 10*3/mm3      Basophils, Absolute 0.06 10*3/mm3      Immature Grans, Absolute 0.04 10*3/mm3      nRBC 0.0 /100 WBC     Urinalysis With Microscopic If Indicated (No Culture) - Urine, Clean Catch [[id number]]  (Abnormal) Collected: 05/06/25 1740    Specimen: Urine, Clean Catch Updated: 05/06/25 1804     Color, UA Dark Yellow     Appearance, UA Clear     pH, UA 6.0     Specific Gravity, UA 1.024     Glucose, UA Negative     Ketones, UA 15 mg/dL (1+)     Bilirubin, UA Negative     Blood, UA Negative     Protein, UA 30 mg/dL (1+)     Leuk Esterase, UA Moderate (2+)     Nitrite, UA Negative     Urobilinogen, UA 1.0 E.U./dL     Urinalysis, Microscopic Only - Urine, Clean Catch [[id number]]  (Abnormal) Collected: 05/06/25 1740    Specimen: Urine, Clean Catch Updated: 05/06/25 1815     RBC, UA None Seen /HPF      WBC, UA 6-10 /HPF      Bacteria, UA None Seen /HPF      Squamous Epithelial Cells, UA 3-6 /HPF      Yeast, UA Small/1+ Yeast /HPF      Hyaline Casts, UA 13-20 /LPF      Methodology Manual Light Microscopy    COVID-19, FLU A/B, RSV PCR 1 HR TAT - Swab, Nasopharynx [[id number]]  (Normal) Collected: 05/06/25 1917    Specimen: Swab from Nasopharynx Updated: 05/06/25 2013     COVID19 Not Detected     Influenza A PCR Not Detected     Influenza B PCR Not Detected     RSV, PCR Not Detected    Narrative:      Fact sheet for providers: https://www.fda.gov/media/179281/download    Fact sheet for patients: https://www.fda.gov/media/272761/download    Test performed by PCR.    Respiratory Panel PCR w/COVID-19(SARS-CoV-2) [facility] In-House, NP Swab in UTM/VTM, 2 HR TAT - Swab, Nasopharynx [[id number]]  (Normal) Collected: 05/06/25 1917    Specimen: Swab from Nasopharynx Updated: 05/06/25 2022     ADENOVIRUS, PCR Not Detected     Coronavirus 229E Not Detected     Coronavirus HKU1 Not Detected     Coronavirus NL63 Not Detected     Coronavirus OC43 Not Detected     COVID19 Not Detected     Human Metapneumovirus Not Detected     Human Rhinovirus/Enterovirus Not Detected     Influenza A PCR Not Detected     Influenza B PCR Not Detected     Parainfluenza Virus 1 Not Detected     Parainfluenza Virus 2 Not Detected     Parainfluenza Virus 3 Not Detected     Parainfluenza Virus 4 Not Detected     RSV, PCR Not Detected     Bordetella pertussis pcr Not Detected     Bordetella parapertussis PCR Not Detected     Chlamydophila pneumoniae PCR Not Detected     Mycoplasma pneumo by PCR Not Detected    Narrative:      In the setting of a positive respiratory panel with a viral infection PLUS a negative procalcitonin without other underlying concern for  bacterial infection, consider observing off antibiotics or discontinuation of antibiotics and continue supportive care. If the respiratory panel is positive for atypical bacterial infection (Bordetella pertussis, Chlamydophila pneumoniae, or Mycoplasma pneumoniae), consider antibiotic de-escalation to target atypical bacterial infection.    High Sensitivity Troponin T 1Hr [[id number]]  (Abnormal) Collected: 05/06/25 1925    Specimen: Blood from Arm, Right Updated: 05/06/25 1955     HS Troponin T 17 ng/L      Troponin T Numeric Delta -2 ng/L      Troponin T % Delta -11    Narrative:      High Sensitive Troponin T Reference Range:  <14.0 ng/L- Negative Female for AMI  <22.0 ng/L- Negative Male for AMI  >=14 - Abnormal Female indicating possible myocardial injury.  >=22 - Abnormal Male indicating possible myocardial injury.   Clinicians would have to utilize clinical acumen, EKG, Troponin, and serial changes to determine if it is an Acute Myocardial Infarction or myocardial injury due to an underlying chronic condition.         Mycoplasma Pneu. IgG / IgM Antibodies [[id number]] Collected: 05/06/25 1925    Specimen: Blood from Arm, Right Updated: 05/06/25 1931    Procalcitonin [[id number]] Collected: 05/06/25 1925    Specimen: Blood from Arm, Right Updated: 05/06/25 2116             Imaging:    CT Chest With Contrast Diagnostic  Result Date: 5/6/2025  CT CHEST W CONTRAST DIAGNOSTIC Date of Exam: 5/6/2025 8:32 PM EDT Indication: Persistent left lower lobe infiltrate for 5 weeks shortness of breath. Comparison: Chest x-ray 5/6/2025, 4/23/2025 Technique: Axial CT images were obtained of the chest after the uneventful intravenous administration of iodinated contrast.  Reconstructed coronal and sagittal images were also obtained. Automated exposure control and iterative construction methods were  used. Findings: Hilum and Mediastinum: There is a 11 mm short axis prevascular lymph node on image #90. Subcarinal lymph node  measures 10 mm in short axis. Mild coronary artery atherosclerotic calcification. Heart size normal. No pericardial effusion. 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 No pulmonary artery emboli on this exam. Lung Parenchyma and Pleura: There is consolidation in the left upper lobe with masslike appearance and septal thickening. There is complete opacification of the lingula. There is narrowing and nonopacification of the lingula airways. There is diffuse bronchial wall thickening. There is septal thickening and small centrilobular nodules in the left lower lobe. There is a moderate size left pleural effusion. The right lung is clear. Upper Abdomen: Enhancing mass at the right posterior hepatic lobe on image 3 and 81 measures 11 mm. Soft tissues: There is a round mass in the lateral left breast. It measures 1.5 x 1.5 cm it is in the upper outer aspect, reference image 76. Osseous structures: Osteopenia. Small sclerotic foci at the superior T10 and superior T11 vertebral body. The T10 sclerotic focus measures about 5 mm. The T11 measures about 4 mm.     1.There is masslike consolidation in the left upper lobe and lingula with septal thickening and centrilobular nodules in the left lower lobe. There is a moderate size left pleural effusion. Findings are suspicious for pneumonia. Underlying malignancy cannot be excluded. Recommend bronchoscopy directed towards the left upper lobe or lingula for evaluation. 2.There is a mass in the upper outer left breast measuring up to 1.5 cm. Diagnostic mammogram and ultrasound is recommended. 3.There is an enhancing mass in the right posterior hepatic lobe measuring 11 mm. This could be seen with a flash filling hemangioma. Metastatic disease cannot be excluded. 4.There are small sclerotic foci in the T10 and T11 vertebral bodies. These could be seen with bone islands. Metastatic disease cannot be excluded. 5.Large hernia with stomach above the diaphragm. 6.Mildly enlarged  mediastinal lymph nodes could be reactive or metastatic. 7.PET/CT may be helpful for better evaluation. Electronically Signed: Alexus Dillard MD  5/6/2025 8:46 PM EDT  Workstation ID: [id number]    XR Chest 1 View  Result Date: 5/6/2025  XR CHEST 1 VW Date of Exam: 5/6/2025 4:43 PM EDT Indication: Weak/Dizzy/AMS triage protocol Comparison: Chest x-ray 4/23/2025 Findings: There is persistent airspace opacity in the left lung. There is volume loss. Given the long-term persistence CT chest is recommended to evaluate for obstructing lesion or neoplasm. Right lung is clear.     Impression: Diffuse airspace opacity in the left lung. This is slightly progressed from previous exam. CT chest is recommended. Persistent pneumonia versus neoplastic process. Electronically Signed: Alexus Dillard MD  5/6/2025 4:54 PM EDT  Workstation ID: [id number]        Differential Diagnosis and Discussion:    Cough: Differential diagnosis includes but is not limited to pneumonia, acute bronchitis, upper respiratory infection, ACE inhibitor use, allergic reaction, epiglottitis, seasonal allergies, chemical irritants, exercise-induced asthma, viral syndrome.    Labs were collected in the emergency department and all labs were reviewed and interpreted by me.  X-ray were performed in the emergency department and all X-ray impressions were independently interpreted by me.    MDM  Number of Diagnoses or Management Options  Liver mass  Mass of left breast, unspecified quadrant  Pneumonia of left upper lobe due to infectious organism  Diagnosis management comments: Sepsis was not present in the emergency department or on arrival. This is supported as the patient does not either meet two out of the four SIRS criteria or has an obvious bacterial infection.      SIRS criteria considered:   1.                     Temperature > 100.4 or <98.6    2.                     Heart Rate > 90    3.                     Respiratory Rate > 22    4.                     WBC >  12K or <4K.     The patient's CBC was reviewed and shows no abnormalities of critical concern.  Of note, there is no anemia requiring a blood transfusion and the platelet count is acceptable    The patient's CMP was reviewed and shows no abnormalities of critical concern.  Of note, the patient's sodium and potassium are acceptable.  The patient's liver enzymes are unremarkable.  The patient's renal function including creatinine is preserved.  The patient has a normal anion gap.    The patient's Covid swab was negative   The patient's Influenza swab was negative   The patient's RSV swab was negative    2 blood cultures were ordered.  The results are pending at the time of disposition    The patient had a completely negative respiratory panel    The patient's magnesium level is unremarkable and thus I do not feel is contributory to the patient's symptoms    Chest x-ray demonstrates a left lower lobe infiltrate    CT scan demonstrates mass consolidation in the left upper lobe and lingula.  There is septal thickening and lobular nodules.  There is a moderate size left lateral effusion.  Findings would be suspicious for pneumonia or other etiologies would include malignancy.  The patient does have a mass in the left upper lobe of the breast.  The patient does have a mass in the liver.  I have discussed this with patient and family they are aware of those findings    The patient had incidental radiographic findings found on imaging today that was thought not to be related to the patient's symptoms.  I have discussed all those incidental findings with the patient.  They understand that it is very important for them to review the imaging performed today with their primary care provider/ specialist and discuss the possible need for further imaging or referral to determine benign etiology of these incidental radiographic findings.  They will follow-up as requested.     The patient will be started on vancomycin and cefepime at  the request of the pulmonologist    The patient is stable at the time of admission.  The patient's vital signs are stable.  The patient is in no distress and appears to be resting comfortably.  The patient is at their baseline mental status       Amount and/or Complexity of Data Reviewed  Clinical lab tests: reviewed and ordered  Tests in the radiology section of CPT®: reviewed and ordered  Tests in the medicine section of CPT®: ordered and reviewed  Discuss the patient with other providers: yes (20:58 EDT  I discussed case with Dr. Nash, on-call for pulmonology.  We have discussed the patient's persistent pneumonia, CT findings, history of C. difficile colitis, recent antibiotics within the last 5 days.  He request that the patient be started on cefepime and vancomycin.  In order to cover for possible Pseudomonas and MRSA.  He states that the patient will require a bronchoscopy which will be performed upon admission.  De-escalation of antibiotics will be determined upon the patient's bronchoscopy.  He is requesting the hospitalist for admission      21:18 EDT    I discussed the case with the hospitalist, Dr. Walsh.  We have discussed the patient's presenting symptoms, laboratory values, imaging and condition at the time of admission.  They will evaluate the patient in the emergency room and admit the patient to the hospital)               Social Determinants of Health:    Patient has presented with family members who are responsible, reliable and will ensure follow up care.      Disposition and Care Coordination:    Admit:   Through independent evaluation of the patient's history, physical, and imperical data, the patient meets criteria for inpatient admission to the hospital.        Final diagnoses:   Pneumonia of left upper lobe due to infectious organism   Mass of left breast, unspecified quadrant   Liver mass        ED Disposition       ED Disposition   Decision to Admit    Condition   --    Comment   Level  of Care: Telemetry [5]   Diagnosis: Pneumonia [213768]   Certification: I Certify That Inpatient Hospital Services Are Medically Necessary For Greater Than 2 Midnights                 This medical record created using voice recognition software.             Mitchell Pettit DO  05/06/25 [id number]

## 2025-05-07 NOTE — PLAN OF CARE
Goal Outcome Evaluation:              Outcome Evaluation: admitted to [facility] from ED this shift. two-person skin assessment performed with BUCK hurtado, no skin impairment present. alert and oriented x4. vss on RA. no c/o pain/discomfort this shift. NPO since midnight for bronch today, 5/7. no new issues/needs at this time.

## 2025-05-07 NOTE — PROCEDURES
Bedside thoracic ultrasound:    Right showed B lines, curtain sign seen.    Left side showed moderate effusion with anechoic space between lung and diaphragm.     Site marked for thoracentesis on left.    Images stored online.

## 2025-05-07 NOTE — PROCEDURES
Thoracentesis Procedure Note    Indication: Moderate left sided pleural effusion    Consent: Yes, placed in chart.    Procedure: The patient was placed in the sitting position and the appropriate landmarks were identified. The skin over the puncture site in the left posterior chest wall was prepped with ChloraPrep and draped in sterile fashion. Local anesthesia was applied with 1% lidocaine. Thoracentesis catheter was inserted with needle guidance. Pleural fluid was clear dark yellow, drained 600 mL fluid. The catheter was then withdrawn and a sterile dressing was placed over the site. A post-procedure film is pending at this time.    The patient tolerated the procedure well.    Complications: None    Electronically signed by Raleigh Tompkins MD, 05/07/25, 11:37 AM EDT.

## 2025-05-07 NOTE — PLAN OF CARE
Goal Outcome Evaluation:           Progress: no change  Outcome Evaluation: Pt is alert and oriented. Bronchoscopy and thoracentesis today 5/7. Pt is on RA at 92% with rhonchi and frequent cough. No complaints during shift. Continue plan of care.

## 2025-05-07 NOTE — H&P
Patient Care Team:  Wen Leblanc MD as PCP - General (Internal Medicine)    Chief complaint shortness of breath and cough    Subjective     Patient is a 79 y.o. female presents with shortness of breath and cough.  Patient initially had symptoms 5 weeks ago and received a brief course of antibiotics, Augmentin.  She developed diarrhea and was positive for C. difficile and subsequently was treated for that.  Her cough and shortness of breath have continued.  She was placed on Zithromax subsequently due to no improvement of symptoms.  Most recently she was given 5 days of Rocephin with last dose 4 days ago.  She still has not improved.  Her PCP encouraged her to come to the emergency department.  She has not had any fevers or chills.  Imaging shows a dense consolidation in the left upper lobe and lingula with septal thickening and centrilobular nodules.  Patient received vancomycin and cefepime.        Review of Systems   Pertinent items are noted in HPI    History  Past Medical History:   Diagnosis Date    Arthritis     Depression     GERD (gastroesophageal reflux disease) 02/12/2015    Hyperlipidemia     Hypertension     Hypothyroidism     Osteoporosis     Vitamin D deficiency      Past Surgical History:   Procedure Laterality Date    ROTATOR CUFF REPAIR Right      History reviewed. No pertinent family history.  Social History     Tobacco Use    Smoking status: Never    Smokeless tobacco: Never   Vaping Use    Vaping status: Never Used   Substance Use Topics    Alcohol use: Yes     Comment: rarely    Drug use: Never     (Not in a hospital admission)   Allergies:  Methotrexate    Objective     Vital Signs  Temp:  [98.4 °F (36.9 °C)] 98.4 °F (36.9 °C)  Heart Rate:  [80-89] 89  Resp:  [16] 16  BP: (110-111)/(70-72) 111/70    Physical Exam:      General Appearance:  Alert, cooperative, in no acute distress   Head:  Normocephalic, without obvious abnormality, atraumatic   Eyes:  Lids and lashes normal,  conjunctivae and sclerae normal, no icterus, no pallor, corneas clear, PERRLA   Ears:  Ears appear intact with no abnormalities noted   Throat:  No oral lesions, no thrush, oral mucosa moist   Neck:  No adenopathy, supple, trachea midline, no thyromegaly, no carotid bruit, no JVD   Back:  No kyphosis present, no scoliosis present, no skin lesions, erythema or scars, no tenderness to percussion, or palpation, range of motion normal   Lungs:  Clear to auscultation,respirations regular, even and unlabored    Heart:  Regular rhythm and normal rate, normal S1 and S2, no murmur, no gallop, no rub, no click   Breast Exam:  Deferred   Abdomen:  Normal bowel sounds, no masses, no organomegaly, soft non-tender, non-distended, no guarding, no rebound tenderness   Genitalia:  Deferred   Extremities:  Moves all extremities well, no edema, no cyanosis, no redness   Pulses:  Pulses palpable and equal bilaterally   Skin:  No bleeding, bruising or rash   Lymph nodes:  No palpable adenopathy   Neurologic:  Cranial nerves 2 - 12 grossly intact, sensation intact, DTR present and equal bilaterally       Results Review:    I reviewed the patient's new clinical results.  I reviewed the patient's new imaging results and agree with the interpretation.  I reviewed the patient's other test results and agree with the interpretation  I personally viewed and interpreted the patient's EKG/Telemetry data    Assessment & Plan       Pneumonia  Breast mass  Leukocytosis  History of recent C. difficile infection  Hepatic hemangioma  Moderate left pleural effusion    Admit to hospitalist service  Remote telemetry  N.p.o. after midnight for bronchoscopy  Broad-spectrum antibiotics with cefepime and vancomycin follow-up cultures and de-escalate antibiotics  Probiotic  Outpatient management of breast mass  Full code        Hamlet Walsh MD  05/06/25  21:18 EDT

## 2025-05-07 NOTE — PROGRESS NOTES
[facility]   Hospitalist Progress Note  Date: 2025  Patient Name: Nancie Grissom  : 1945  MRN: [id number]  Date of admission: 2025  Room/Bed: [facility]      Subjective   Subjective     Chief Complaint: Shortness of breath and cough    Summary:Nancie Grissom is a 79 y.o. female with hypertension, hypothyroidism, anxiety who presents to the emergency room with shortness of breath and cough.  Patient first had symptoms 5 weeks ago.  A course of Augmentin.  Developed C. difficile diarrhea.  Cough and shortness of breath have continued.  Symptoms did not improve with azithromycin.  She was given 5 days of Rocephin.  Symptoms still do not improve.  CT chest with contrast showed masslike consolidation left upper lobe and left pleural effusion.  Started on vancomycin and cefepime.  CT also showed mass in the upper outer left breast.  There were also small sclerotic foci in the T10 and T11 vertebral bodies.  Large hernia with stomach and both diaphragm also seen.  Mildly enlarged mediastinal lymph nodes also appreciated. Pulmonology consulted.  Thoracentesis done on 2025.  Follow-up pleural fluid data.     Interval Followup:   Patient says she feels better since coming to the hospital.  Son at bedside, updated    All systems reviewed and negative except for what is outlined above.      Objective   Objective     Vitals:   Temp:  [97.5 °F (36.4 °C)-98.6 °F (37 °C)] 97.7 °F (36.5 °C)  Heart Rate:  [] 87  Resp:  [10-21] 16  BP: ()/() 112/68  Flow (L/min) (Oxygen Therapy):  [2-4] 2    Physical Exam   General: NAD  Cardiovascular: RRR  Pulmonary: CTA bilaterally  Gastrointestinal: S/ND/NT  Psych: Mood and affect appropriate    Result Review    Result Review:  I have personally reviewed these results:  [x]  Laboratory      Lab 25  0537 25  1925 25  1627   WBC 7.99  --  11.21*   HEMOGLOBIN 12.5  --  14.0   HEMATOCRIT 37.5  --  41.6   PLATELETS 225  --  309   NEUTROS ABS 6.07  --   9.38*   IMMATURE GRANS (ABS) 0.02  --  0.04   LYMPHS ABS 0.93  --  0.95   MONOS ABS 0.70  --  0.59   EOS ABS 0.21  --  0.19   MCV 91.0  --  91.6   PROCALCITONIN  --  0.06  --          Lab 05/07/25  0537 05/06/25  1627   SODIUM 138 137   POTASSIUM 3.9 4.2   CHLORIDE 103 101   CO2 23.9 24.4   ANION GAP 11.1 11.6   BUN 16 17   CREATININE 0.88 1.08*   EGFR 66.1 51.7*   GLUCOSE 91 106*   CALCIUM 8.4* 8.9   MAGNESIUM  --  2.2         Lab 05/06/25  1627   TOTAL PROTEIN 7.0   ALBUMIN 3.8   GLOBULIN 3.2   ALT (SGPT) 11   AST (SGOT) 21   BILIRUBIN 0.4   ALK PHOS 72         Lab 05/06/25  1925 05/06/25  1627   HSTROP T 17* 19*                 Brief Urine Lab Results  (Last result in the past 365 days)        Color   Clarity   Blood   Leuk Est   Nitrite   Protein   CREAT   Urine HCG        05/06/25 1740 Dark Yellow   Clear   Negative   Moderate (2+)   Negative   30 mg/dL (1+)                 [x]  Microbiology   Microbiology Results (last 10 days)       Procedure Component Value - Date/Time    Respiratory Culture - Wash, Lung, Left Upper Lobe [[id number]] Collected: 05/07/25 1155    Lab Status: Preliminary result Specimen: Wash from Lung, Left Upper Lobe Updated: 05/07/25 1335     Gram Stain Few (2+) Gram negative bacilli    AFB Culture - Body Fluid, Pleural Cavity [[id number]] Collected: 05/07/25 1152    Lab Status: Preliminary result Specimen: Body Fluid from Pleural Cavity Updated: 05/07/25 1324     AFB Stain No acid fast bacilli seen    Body Fluid Culture - Body Fluid, Pleural Cavity [[id number]] Collected: 05/07/25 1152    Lab Status: Preliminary result Specimen: Body Fluid from Pleural Cavity Updated: 05/07/25 1329     Gram Stain No organisms seen    Gram Stain - No Culture [[id number]] Collected: 05/07/25 1147    Lab Status: Final result Specimen: Brushing from Lung, Left Upper Lobe Updated: 05/07/25 1330     Gram Stain No organisms seen    AFB Stain - No Culture - Brushing, Lung, Left Upper Lobe [[id number]] Collected:  05/07/25 1147    Lab Status: Final result Specimen: Brushing from Lung, Left Upper Lobe Updated: 05/07/25 1318     AFB Stain No acid fast bacilli seen    AFB Culture - Lavage, Lung, Left Upper Lobe [[id number]] Collected: 05/07/25 1143    Lab Status: Preliminary result Specimen: Lavage from Lung, Left Upper Lobe Updated: 05/07/25 1322     AFB Stain No acid fast bacilli seen on direct smear    BAL Culture, Quantitative - Lavage, Lung, Left Upper Lobe [[id number]] Collected: 05/07/25 1143    Lab Status: Preliminary result Specimen: Lavage from Lung, Left Upper Lobe Updated: 05/07/25 1333     Gram Stain No organisms seen    Pneumonia Panel - Lavage, Lung, Left Upper Lobe [[id number]]  (Normal) Collected: 05/07/25 1143    Lab Status: Final result Specimen: Lavage from Lung, Left Upper Lobe Updated: 05/07/25 1357     Escherichia coli PCR Not Detected     Acinetobacter calcoaceticus-baumannii complex PCR Not Detected     Enterobacter cloacae PCR Not Detected     Klebsiella oxytoca PCR Not Detected     Klebsiella pneumoniae group PCR Not Detected     Klebsiella aerogenes PCR Not Detected     Moraxella catarrhalis PCR Not Detected     Proteus species PCR Not Detected     Pseudomonas aeroginosa PCR Not Detected     Serratia marcescens PCR Not Detected     Staphylococcus aureus PCR Not Detected     Streptococcus pyogenes PCR Not Detected     Haemophilus influenzae PCR Not Detected     Streptococcus agalactiae PCR Not Detected     Streptococcus pneumoniae PCR Not Detected     Chlamydophila pneumoniae PCR Not Detected     Legionella pneumophilia PCR Not Detected     Mycoplasma pneumo by PCR Not Detected     ADENOVIRUS, PCR Not Detected     CTX-M Gene N/A     IMP Gene N/A     KPC Gene N/A     mecA/C and MREJ Gene N/A     NDM Gene N/A     OXA-48-like Gene N/A     VIM Gene N/A     Coronavirus Not Detected     Human Metapneumovirus Not Detected     Human Rhinovirus/Enterovirus Not Detected     Influenza A PCR Not Detected      Influenza B PCR Not Detected     RSV, PCR Not Detected     Parainfluenza virus PCR Not Detected    MRSA Screen, PCR (Inpatient) - Swab, Nares [[id number]]  (Normal) Collected: 05/06/25 2333    Lab Status: Final result Specimen: Swab from Nares Updated: 05/07/25 0059     MRSA PCR No MRSA Detected    Narrative:      The negative predictive value of this diagnostic test is high and should only be used to consider de-escalating anti-MRSA therapy. A positive result may indicate colonization with MRSA and must be correlated clinically.    COVID-19, FLU A/B, RSV PCR 1 HR TAT - Swab, Nasopharynx [[id number]]  (Normal) Collected: 05/06/25 1917    Lab Status: Final result Specimen: Swab from Nasopharynx Updated: 05/06/25 2013     COVID19 Not Detected     Influenza A PCR Not Detected     Influenza B PCR Not Detected     RSV, PCR Not Detected    Narrative:      Fact sheet for providers: https://www.fda.gov/media/025517/download    Fact sheet for patients: https://www.fda.gov/media/154789/download    Test performed by PCR.    Respiratory Panel PCR w/COVID-19(SARS-CoV-2) [facility] In-House, NP Swab in UTM/VTM, 2 HR TAT - Swab, Nasopharynx [[id number]]  (Normal) Collected: 05/06/25 1917    Lab Status: Final result Specimen: Swab from Nasopharynx Updated: 05/06/25 2022     ADENOVIRUS, PCR Not Detected     Coronavirus 229E Not Detected     Coronavirus HKU1 Not Detected     Coronavirus NL63 Not Detected     Coronavirus OC43 Not Detected     COVID19 Not Detected     Human Metapneumovirus Not Detected     Human Rhinovirus/Enterovirus Not Detected     Influenza A PCR Not Detected     Influenza B PCR Not Detected     Parainfluenza Virus 1 Not Detected     Parainfluenza Virus 2 Not Detected     Parainfluenza Virus 3 Not Detected     Parainfluenza Virus 4 Not Detected     RSV, PCR Not Detected     Bordetella pertussis pcr Not Detected     Bordetella parapertussis PCR Not Detected     Chlamydophila pneumoniae PCR Not Detected      Mycoplasma pneumo by PCR Not Detected    Narrative:      In the setting of a positive respiratory panel with a viral infection PLUS a negative procalcitonin without other underlying concern for bacterial infection, consider observing off antibiotics or discontinuation of antibiotics and continue supportive care. If the respiratory panel is positive for atypical bacterial infection (Bordetella pertussis, Chlamydophila pneumoniae, or Mycoplasma pneumoniae), consider antibiotic de-escalation to target atypical bacterial infection.          [x]  Radiology  XR Chest 1 View  Result Date: 5/7/2025  Impression: 1.No pneumothorax following left thoracentesis. 2.Increase in left lung infiltrate. 3.Large hiatal hernia. Electronically Signed: Hamlet Barker MD  5/7/2025 12:58 PM EDT  Workstation ID: [id number]    CT Chest With Contrast Diagnostic  Result Date: 5/6/2025  1.There is masslike consolidation in the left upper lobe and lingula with septal thickening and centrilobular nodules in the left lower lobe. There is a moderate size left pleural effusion. Findings are suspicious for pneumonia. Underlying malignancy cannot be excluded. Recommend bronchoscopy directed towards the left upper lobe or lingula for evaluation. 2.There is a mass in the upper outer left breast measuring up to 1.5 cm. Diagnostic mammogram and ultrasound is recommended. 3.There is an enhancing mass in the right posterior hepatic lobe measuring 11 mm. This could be seen with a flash filling hemangioma. Metastatic disease cannot be excluded. 4.There are small sclerotic foci in the T10 and T11 vertebral bodies. These could be seen with bone islands. Metastatic disease cannot be excluded. 5.Large hernia with stomach above the diaphragm. 6.Mildly enlarged mediastinal lymph nodes could be reactive or metastatic. 7.PET/CT may be helpful for better evaluation. Electronically Signed: Alexus Dillard MD  5/6/2025 8:46 PM EDT  Workstation ID: [id number]    XR Chest 1  View  Result Date: 5/6/2025  Impression: Diffuse airspace opacity in the left lung. This is slightly progressed from previous exam. CT chest is recommended. Persistent pneumonia versus neoplastic process. Electronically Signed: Alexus Dillard MD  5/6/2025 4:54 PM EDT  Workstation ID: [id number]    []  EKG/Telemetry   []  Cardiology/Vascular   []  Pathology  []  Old records  []  Other:    Assessment & Plan        Assessment and Plan:    #Pneumonia  #Breast mass  #Pleural effusion status post thoracentesis  #Sclerotic foci T10 and T11  #Enlargement of mediastinal lymph node  Continue vancomycin and cefepime  Follow-up pleural fluid studies  Pulmonology consulted, appreciate recommendations    #Anxiety  Trazodone  Amitriptyline    #Hypothyroidism  Levothyroxine    #Hyperlipidemia  Atorvastatin       Discussed with RN.    VTE Prophylaxis:  Mechanical VTE prophylaxis orders are present.        CODE STATUS:   Code Status (Patient has no pulse and is not breathing): CPR (Attempt to Resuscitate)  Medical Interventions (Patient has pulse or is breathing): Full Support      Electronically signed by Michael Dahl MD, 5/7/2025, 15:16 EDT.

## 2025-05-07 NOTE — OP NOTE
Bronchoscopy Procedure Note    Procedure:  Bronchoscopy with bronchoalveolar lavage left upper lobe  Bronchoscopy with bronchial brushing left upper lobe  Bronchoscopy with transbronchial biopsies left upper lobe  Bronchoscopy with bronchial washings tracheobronchial tree  Bronchoscopy with airway inspection    Pre-Operative Diagnosis: Left upper lobe infiltrate, left-sided pleural effusion  Post-Operative Diagnosis: Left upper lobe indurated airway    Indication:  Left upper lobe infiltrate, left-sided pleural effusion    Anesthesia: MAC anesthesia    Procedure Details: Patient was consented for the procedure with all risks and benefits of the procedure explained in detail. Patient was given the opportunity to ask questions and all concerns were answered.    The bronchoscope was inserted into the main airway via the mouth. An anatomical survey was done of the main airways and the subsegmental bronchus. The findings are reported below. A bronchoalveolar lavage was performed using 60 mL aliquots of normal saline x 2 instilled into the airways then aspirated back from left upper lobe, anterior segment of lung with 45 mL serosanguineous fluid in return. Then bronchial brushing was obtained from left upper lobe, into segment of lung with 2 passes. Blind transbronchial biopsies were done from left upper lobe, into segment of lung with several passes blindly. Patient had minimal oozing after the biopsy.  Bronchial washing was obtained from entire tracheobronchial tree.    Findings:  Friable mucosa, easy bleeding with suction  Airway was indurated.    Estimated Blood Loss: Insignificant    Specimens:  BAL left upper lobe  Bronchial brushing left upper lobe  Transbronchial biopsies left upper lobe  Bronchial washings tracheobronchial tree    Complications: None; patient tolerated the procedure well.    Disposition: To [facility], once stable in recovery.    Patient tolerated the procedure well.      Electronically signed  by Raleigh Tompkins MD, 5/7/2025, 12:04 EDT.

## 2025-05-07 NOTE — PROGRESS NOTES
"[facility]: Vancomycin Pharmacokinetic Initial Consult Note    Nancie Grissom is a 79 y.o. female who is on day 2 of pharmacy to dose vancomycin for Pneumonia.    Consult Information  Consulting Provider: Hamlet Walsh MD  Planned Duration of Therapy: 7 days (through )  Was Patient Receiving Prior to Admission/Consult?: No  Loading Dose Ordered or Given: 1250 mg on  at 2236  PK/PD Target: -600 mg/L.hr   Relevant ID History: initial symptoms 5 weeks ago and received Augmentin 875 BID x 7 days.  Developed diarrhea, c.diff positive -- was treated with Dificid  Other Antimicrobials: Cefepime    Imaging Reviewed?: Yes    Microbiology Data  MRSA PCR performed: 25; Result: Negative  Culture/Source:    Respiratory panel: none detected   COVID-19, flu A/B, RSV: none detected   Blood cx: in process   Pneumonia panel: in process   Body fluid cx, pleural cavity: in process   Respiratory cx, left upper lobe: in process    Vitals/Labs  Ht: 154.9 cm (60.98\"); Wt: 68.2 kg (150 lb 5.7 oz)  Temp (24hrs), Av.1 °F (36.7 °C), Min:97.5 °F (36.4 °C), Max:98.6 °F (37 °C)   Estimated Creatinine Clearance: 44.3 mL/min (by C-G formula based on SCr of 0.88 mg/dL).     Results from last 7 days   Lab Units 25  1403 25  0537 25  1627   VANCOMYCIN RM mcg/mL 8.15  --   --    CREATININE mg/dL  --  0.88 1.08*   WBC 10*3/mm3  --  7.99 11.21*     Assessment/Plan:    Vancomycin Dose:  1250 mg IV every 24 hours; which provides the following predicted parameters:  AUC24,ss: 492 mg/L.hr  Probability of AUC24 > 400: 88 %  Ctrough,ss: 14.6 mg/L  Probability of Ctrough,ss > 20: 12 %  Probability of nephrotoxicity (Lodise BULMARO ): 10 %  *adjusted start time of maintenance dose to 1700 due to level less than 20 mcg/mL.    Vanc Random ordered for 5/9 at AM labs  Patient has order for Basic Metabolic Panel    Pharmacy will follow patient's kidney function and will adjust " doses and obtain levels as necessary. Thank you for involving pharmacy in this patient's care. Please contact pharmacy with any questions or concerns.                           Kenisha Maria RPH  Clinical Pharmacist

## 2025-05-07 NOTE — ANESTHESIA POSTPROCEDURE EVALUATION
Patient: Nancie Grissom    Procedure Summary       Date: 05/07/25 Room / Location: [facility] OR 01 / [facility] MAIN OR    Anesthesia Start: 1115 Anesthesia Stop: 1213    Procedure: BRONCHOSCOPY WITH BRONCHOALVEOLAR LAVAGE, POSSIBLE BIOPSY, BRUSHING, WASHING, AIRWAY INSPECTION: insertion of lighted instrument to view inside the lung (Bronchus) Diagnosis:       Pneumonia of left upper lobe due to infectious organism      (Pneumonia of left upper lobe due to infectious organism [J18.9])    Surgeons: Raleigh Tompkins MD Provider: Antoinette Cruz MD    Anesthesia Type: general, MAC ASA Status: 3            Anesthesia Type: general, MAC    Vitals  Vitals Value Taken Time   BP 77/64 05/07/25 12:53   Temp 36.4 °C (97.5 °F) 05/07/25 12:10   Pulse 106 05/07/25 12:56   Resp 16 05/07/25 12:40   SpO2 97 % 05/07/25 12:56   Vitals shown include unfiled device data.        Post Anesthesia Care and Evaluation    Patient location during evaluation: bedside  Patient participation: complete - patient participated  Level of consciousness: awake  Pain management: adequate    Airway patency: patent  PONV Status: none  Cardiovascular status: acceptable and stable  Respiratory status: acceptable  Hydration status: acceptable

## 2025-05-07 NOTE — ANESTHESIA PREPROCEDURE EVALUATION
Anesthesia Evaluation     Patient summary reviewed and Nursing notes reviewed   no history of anesthetic complications:   NPO Solid Status: > 8 hours  NPO Liquid Status: > 2 hours           Airway   Mallampati: II  TM distance: >3 FB  Neck ROM: full  No difficulty expected  Dental - normal exam     Pulmonary - normal exam    breath sounds clear to auscultation  (+) pneumonia ,    ROS comment: To ER for evaluation of cough and shortness of breath.    Chest CT: .There is masslike consolidation in the left upper lobe and lingula with septal thickening and centrilobular nodules in the left lower lobe. There is a moderate size left pleural effusion. Findings are suspicious for pneumonia. Underlying malignancy cannot be excluded. Recommend bronchoscopy directed towards the left upper lobe or lingula for evaluation  Cardiovascular - normal exam  Exercise tolerance: good (4-7 METS)    ECG reviewed  Rhythm: regular  Rate: normal    (+) hypertension (hctz, lisinopril) 2 medications or greater, hyperlipidemia    ROS comment: EK bpm  Sinus rhythm  No previous ECG available for comparison  Electronically Signed By: Vladimir Williamson ([facility]) 2025 19:18:19  Date and Time of Study:2025 16:30:50      Neuro/Psych  (+) psychiatric history  GI/Hepatic/Renal/Endo    (+) GERD well controlled, renal disease- CRI, thyroid problem hypothyroidism    Musculoskeletal     Abdominal  - normal exam   Substance History - negative use     OB/GYN negative ob/gyn ROS         Other   arthritis,     ROS/Med Hx Other: PAT Nursing Notes unavailable.                     Anesthesia Plan    ASA 3     general and MAC     (Patient understands anesthesia not responsible for dental damage.)  intravenous induction     Anesthetic plan, risks, benefits, and alternatives have been provided, discussed and informed consent has been obtained with: patient and child.    Use of blood products discussed with patient .    Plan discussed with CRNA.        CODE  STATUS:    Code Status (Patient has no pulse and is not breathing): CPR (Attempt to Resuscitate)  Medical Interventions (Patient has pulse or is breathing): Full Support

## 2025-05-08 LAB
ANION GAP SERPL CALCULATED.3IONS-SCNC: 9.5 MMOL/L (ref 5–15)
BASOPHILS # BLD AUTO: 0.06 10*3/MM3 (ref 0–0.2)
BASOPHILS NFR BLD AUTO: 0.8 % (ref 0–1.5)
BUN SERPL-MCNC: 13 MG/DL (ref 8–23)
BUN/CREAT SERPL: 16.9 (ref 7–25)
CALCIUM SPEC-SCNC: 8.3 MG/DL (ref 8.6–10.5)
CHLORIDE SERPL-SCNC: 102 MMOL/L (ref 98–107)
CO2 SERPL-SCNC: 24.5 MMOL/L (ref 22–29)
CREAT SERPL-MCNC: 0.77 MG/DL (ref 0.57–1)
DEPRECATED RDW RBC AUTO: 47.4 FL (ref 37–54)
EGFRCR SERPLBLD CKD-EPI 2021: 77.6 ML/MIN/1.73
EOSINOPHIL # BLD AUTO: 0.2 10*3/MM3 (ref 0–0.4)
EOSINOPHIL NFR BLD AUTO: 2.5 % (ref 0.3–6.2)
ERYTHROCYTE [DISTWIDTH] IN BLOOD BY AUTOMATED COUNT: 14.1 % (ref 12.3–15.4)
GLUCOSE SERPL-MCNC: 87 MG/DL (ref 65–99)
HCT VFR BLD AUTO: 35.2 % (ref 34–46.6)
HGB BLD-MCNC: 11.7 G/DL (ref 12–15.9)
IMM GRANULOCYTES # BLD AUTO: 0.03 10*3/MM3 (ref 0–0.05)
IMM GRANULOCYTES NFR BLD AUTO: 0.4 % (ref 0–0.5)
LYMPHOCYTES # BLD AUTO: 0.9 10*3/MM3 (ref 0.7–3.1)
LYMPHOCYTES NFR BLD AUTO: 11.3 % (ref 19.6–45.3)
M PNEUMO IGG SER IA-ACNC: 259 U/ML (ref 0–99)
M PNEUMO IGM SER IA-ACNC: <770 U/ML (ref 0–769)
MCH RBC QN AUTO: 30.6 PG (ref 26.6–33)
MCHC RBC AUTO-ENTMCNC: 33.2 G/DL (ref 31.5–35.7)
MCV RBC AUTO: 92.1 FL (ref 79–97)
MONOCYTES # BLD AUTO: 0.6 10*3/MM3 (ref 0.1–0.9)
MONOCYTES NFR BLD AUTO: 7.6 % (ref 5–12)
NEUTROPHILS NFR BLD AUTO: 6.15 10*3/MM3 (ref 1.7–7)
NEUTROPHILS NFR BLD AUTO: 77.4 % (ref 42.7–76)
NRBC BLD AUTO-RTO: 0 /100 WBC (ref 0–0.2)
PLATELET # BLD AUTO: 217 10*3/MM3 (ref 140–450)
PMV BLD AUTO: 10.5 FL (ref 6–12)
POTASSIUM SERPL-SCNC: 3.8 MMOL/L (ref 3.5–5.2)
RBC # BLD AUTO: 3.82 10*6/MM3 (ref 3.77–5.28)
SODIUM SERPL-SCNC: 136 MMOL/L (ref 136–145)
WBC NRBC COR # BLD AUTO: 7.94 10*3/MM3 (ref 3.4–10.8)

## 2025-05-08 PROCEDURE — 85025 COMPLETE CBC W/AUTO DIFF WBC: CPT | Performed by: STUDENT IN AN ORGANIZED HEALTH CARE EDUCATION/TRAINING PROGRAM

## 2025-05-08 PROCEDURE — 80048 BASIC METABOLIC PNL TOTAL CA: CPT | Performed by: INTERNAL MEDICINE

## 2025-05-08 PROCEDURE — 25010000002 CEFEPIME PER 500 MG: Performed by: INTERNAL MEDICINE

## 2025-05-08 PROCEDURE — 99232 SBSQ HOSP IP/OBS MODERATE 35: CPT | Performed by: STUDENT IN AN ORGANIZED HEALTH CARE EDUCATION/TRAINING PROGRAM

## 2025-05-08 PROCEDURE — 63710000001 PREDNISONE PER 1 MG: Performed by: STUDENT IN AN ORGANIZED HEALTH CARE EDUCATION/TRAINING PROGRAM

## 2025-05-08 PROCEDURE — 99233 SBSQ HOSP IP/OBS HIGH 50: CPT | Performed by: INTERNAL MEDICINE

## 2025-05-08 PROCEDURE — 94761 N-INVAS EAR/PLS OXIMETRY MLT: CPT

## 2025-05-08 PROCEDURE — 94799 UNLISTED PULMONARY SVC/PX: CPT

## 2025-05-08 RX ORDER — PREDNISONE 20 MG/1
40 TABLET ORAL
Status: DISCONTINUED | OUTPATIENT
Start: 2025-05-08 | End: 2025-05-09 | Stop reason: HOSPADM

## 2025-05-08 RX ADMIN — LEVOTHYROXINE SODIUM 50 MCG: 0.05 TABLET ORAL at 06:21

## 2025-05-08 RX ADMIN — ATORVASTATIN CALCIUM 20 MG: 20 TABLET, FILM COATED ORAL at 21:19

## 2025-05-08 RX ADMIN — PREDNISONE 40 MG: 20 TABLET ORAL at 15:23

## 2025-05-08 RX ADMIN — AMITRIPTYLINE HYDROCHLORIDE 10 MG: 10 TABLET, FILM COATED ORAL at 21:19

## 2025-05-08 RX ADMIN — CEFEPIME 2000 MG: 2 INJECTION, POWDER, FOR SOLUTION INTRAVENOUS at 10:28

## 2025-05-08 RX ADMIN — TRAZODONE HYDROCHLORIDE 50 MG: 50 TABLET ORAL at 21:19

## 2025-05-08 RX ADMIN — Medication 250 MG: at 21:19

## 2025-05-08 RX ADMIN — HYDROCODONE POLISTIREX AND CHLORPHENIRAMINE POLISTIREX 2.5 ML: 10; 8 SUSPENSION, EXTENDED RELEASE ORAL at 23:57

## 2025-05-08 RX ADMIN — HYDROCODONE POLISTIREX AND CHLORPHENIRAMINE POLISTIREX 2.5 ML: 10; 8 SUSPENSION, EXTENDED RELEASE ORAL at 08:38

## 2025-05-08 RX ADMIN — Medication 10 ML: at 10:28

## 2025-05-08 RX ADMIN — Medication 250 MG: at 08:38

## 2025-05-08 RX ADMIN — Medication 10 ML: at 21:20

## 2025-05-08 NOTE — PLAN OF CARE
Goal Outcome Evaluation:      VSS. Pt is Aox4. Pt visiting with several family members. She has no c/o pain or discomfort. Pt IV infiltrated with a 10ml Saline flush. Iv removed, elevated and warm blanket applied. New 22g IV placed in the R UA. Blood return noted.

## 2025-05-08 NOTE — PROGRESS NOTES
[facility]   Hospitalist Progress Note  Date: 2025  Patient Name: Nancie Grissom  : 1945  MRN: [id number]  Date of admission: 2025  Room/Bed: [facility]      Subjective   Subjective     Chief Complaint: Shortness of breath and cough    Summary:Nancie Grissom is a 79 y.o. female with hypertension, hypothyroidism, anxiety who presents to the emergency room with shortness of breath and cough.  Patient first had symptoms 5 weeks ago.  A course of Augmentin.  Developed C. difficile diarrhea.  Cough and shortness of breath have continued.  Symptoms did not improve with azithromycin.  She was given 5 days of Rocephin.  Symptoms still do not improve.  CT chest with contrast showed masslike consolidation left upper lobe and left pleural effusion.  Started on vancomycin and cefepime.  CT also showed mass in the upper outer left breast and hepatic lobe mass.  There were also small sclerotic foci in the T10 and T11 vertebral bodies.  Large hernia with stomach and both diaphragm also seen.  Mildly enlarged mediastinal lymph nodes also appreciated. Pulmonology consulted.  Thoracentesis done on 2025.  Infectious work up of pleural fluid negative. Concern for malignancy. DC abx on . Started prednisone.     Interval Followup:   Updated patient and family at length about CT findings, concern for malignancy. Patient will go home tomorrow morning.     All systems reviewed and negative except for what is outlined above.      Objective   Objective     Vitals:   Temp:  [97.9 °F (36.6 °C)-98.8 °F (37.1 °C)] 98.2 °F (36.8 °C)  Heart Rate:  [85-94] 94  Resp:  [16-18] 18  BP: (107-136)/(65-90) 136/90  Flow (L/min) (Oxygen Therapy):  [0] 0    Physical Exam   General: NAD  Cardiovascular: normal S1, S2  Pulmonary: normal WOB  Psych: Mood and affect appropriate    Result Review    Result Review:  I have personally reviewed these results:  [x]  Laboratory      Lab 25  0534 25  0537 25  1925 25  1627   WBC  7.94 7.99  --  11.21*   HEMOGLOBIN 11.7* 12.5  --  14.0   HEMATOCRIT 35.2 37.5  --  41.6   PLATELETS 217 225  --  309   NEUTROS ABS 6.15 6.07  --  9.38*   IMMATURE GRANS (ABS) 0.03 0.02  --  0.04   LYMPHS ABS 0.90 0.93  --  0.95   MONOS ABS 0.60 0.70  --  0.59   EOS ABS 0.20 0.21  --  0.19   MCV 92.1 91.0  --  91.6   PROCALCITONIN  --   --  0.06  --          Lab 05/08/25  0534 05/07/25  0537 05/06/25  1627   SODIUM 136 138 137   POTASSIUM 3.8 3.9 4.2   CHLORIDE 102 103 101   CO2 24.5 23.9 24.4   ANION GAP 9.5 11.1 11.6   BUN 13 16 17   CREATININE 0.77 0.88 1.08*   EGFR 77.6 66.1 51.7*   GLUCOSE 87 91 106*   CALCIUM 8.3* 8.4* 8.9   MAGNESIUM  --   --  2.2         Lab 05/06/25  1627   TOTAL PROTEIN 7.0   ALBUMIN 3.8   GLOBULIN 3.2   ALT (SGPT) 11   AST (SGOT) 21   BILIRUBIN 0.4   ALK PHOS 72         Lab 05/06/25  1925 05/06/25  1627   HSTROP T 17* 19*                 Brief Urine Lab Results  (Last result in the past 365 days)        Color   Clarity   Blood   Leuk Est   Nitrite   Protein   CREAT   Urine HCG        05/06/25 1740 Dark Yellow   Clear   Negative   Moderate (2+)   Negative   30 mg/dL (1+)                 [x]  Microbiology   Microbiology Results (last 10 days)       Procedure Component Value - Date/Time    Respiratory Culture - Wash, Lung, Left Upper Lobe [[id number]] Collected: 05/07/25 1155    Lab Status: Preliminary result Specimen: Wash from Lung, Left Upper Lobe Updated: 05/08/25 1258     Respiratory Culture Scant growth (1+) The culture consists of normal respiratory lizbeth. This is a preliminary report; final report to follow.     Gram Stain Few (2+) Gram negative bacilli    AFB Culture - Body Fluid, Pleural Cavity [[id number]] Collected: 05/07/25 1152    Lab Status: Preliminary result Specimen: Body Fluid from Pleural Cavity Updated: 05/07/25 1324     AFB Stain No acid fast bacilli seen    Body Fluid Culture - Body Fluid, Pleural Cavity [[id number]] Collected: 05/07/25 1152    Lab Status: Preliminary  result Specimen: Body Fluid from Pleural Cavity Updated: 05/08/25 0901     Body Fluid Culture No growth     Gram Stain No organisms seen    Gram Stain - No Culture [[id number]] Collected: 05/07/25 1147    Lab Status: Final result Specimen: Brushing from Lung, Left Upper Lobe Updated: 05/07/25 1330     Gram Stain No organisms seen    AFB Stain - No Culture - Brushing, Lung, Left Upper Lobe [[id number]] Collected: 05/07/25 1147    Lab Status: Final result Specimen: Brushing from Lung, Left Upper Lobe Updated: 05/07/25 1318     AFB Stain No acid fast bacilli seen    AFB Culture - Lavage, Lung, Left Upper Lobe [[id number]] Collected: 05/07/25 1143    Lab Status: Preliminary result Specimen: Lavage from Lung, Left Upper Lobe Updated: 05/08/25 1522     AFB Stain No acid fast bacilli seen on direct smear      No acid fast bacilli seen on concentrated smear    BAL Culture, Quantitative - Lavage, Lung, Left Upper Lobe [[id number]] Collected: 05/07/25 1143    Lab Status: Preliminary result Specimen: Lavage from Lung, Left Upper Lobe Updated: 05/08/25 1259     BAL Culture No growth     Gram Stain No organisms seen    Pneumonia Panel - Lavage, Lung, Left Upper Lobe [[id number]]  (Normal) Collected: 05/07/25 1143    Lab Status: Final result Specimen: Lavage from Lung, Left Upper Lobe Updated: 05/07/25 1357     Escherichia coli PCR Not Detected     Acinetobacter calcoaceticus-baumannii complex PCR Not Detected     Enterobacter cloacae PCR Not Detected     Klebsiella oxytoca PCR Not Detected     Klebsiella pneumoniae group PCR Not Detected     Klebsiella aerogenes PCR Not Detected     Moraxella catarrhalis PCR Not Detected     Proteus species PCR Not Detected     Pseudomonas aeroginosa PCR Not Detected     Serratia marcescens PCR Not Detected     Staphylococcus aureus PCR Not Detected     Streptococcus pyogenes PCR Not Detected     Haemophilus influenzae PCR Not Detected     Streptococcus agalactiae PCR Not Detected     Streptococcus  pneumoniae PCR Not Detected     Chlamydophila pneumoniae PCR Not Detected     Legionella pneumophilia PCR Not Detected     Mycoplasma pneumo by PCR Not Detected     ADENOVIRUS, PCR Not Detected     CTX-M Gene N/A     IMP Gene N/A     KPC Gene N/A     mecA/C and MREJ Gene N/A     NDM Gene N/A     OXA-48-like Gene N/A     VIM Gene N/A     Coronavirus Not Detected     Human Metapneumovirus Not Detected     Human Rhinovirus/Enterovirus Not Detected     Influenza A PCR Not Detected     Influenza B PCR Not Detected     RSV, PCR Not Detected     Parainfluenza virus PCR Not Detected    MRSA Screen, PCR (Inpatient) - Swab, Nares [[id number]]  (Normal) Collected: 05/06/25 2333    Lab Status: Final result Specimen: Swab from Nares Updated: 05/07/25 0059     MRSA PCR No MRSA Detected    Narrative:      The negative predictive value of this diagnostic test is high and should only be used to consider de-escalating anti-MRSA therapy. A positive result may indicate colonization with MRSA and must be correlated clinically.    Blood Culture - Blood, Arm, Left [[id number]]  (Normal) Collected: 05/06/25 2148    Lab Status: Preliminary result Specimen: Blood from Arm, Left Updated: 05/07/25 2200     Blood Culture No growth at 24 hours    Narrative:      Less than seven (7) mL's of blood was collected.  Insufficient quantity may yield false negative results.    Blood Culture - Blood, Arm, Left [[id number]]  (Normal) Collected: 05/06/25 2123    Lab Status: Preliminary result Specimen: Blood from Arm, Left Updated: 05/07/25 2146     Blood Culture No growth at 24 hours    Narrative:      Less than seven (7) mL's of blood was collected.  Insufficient quantity may yield false negative results.    COVID-19, FLU A/B, RSV PCR 1 HR TAT - Swab, Nasopharynx [[id number]]  (Normal) Collected: 05/06/25 1917    Lab Status: Final result Specimen: Swab from Nasopharynx Updated: 05/06/25 2013     COVID19 Not Detected     Influenza A PCR Not Detected      Influenza B PCR Not Detected     RSV, PCR Not Detected    Narrative:      Fact sheet for providers: https://www.fda.gov/media/115046/download    Fact sheet for patients: https://www.fda.gov/media/733431/download    Test performed by PCR.    Respiratory Panel PCR w/COVID-19(SARS-CoV-2) [facility] In-House, NP Swab in UTM/VTM, 2 HR TAT - Swab, Nasopharynx [[id number]]  (Normal) Collected: 05/06/25 1917    Lab Status: Final result Specimen: Swab from Nasopharynx Updated: 05/06/25 2022     ADENOVIRUS, PCR Not Detected     Coronavirus 229E Not Detected     Coronavirus HKU1 Not Detected     Coronavirus NL63 Not Detected     Coronavirus OC43 Not Detected     COVID19 Not Detected     Human Metapneumovirus Not Detected     Human Rhinovirus/Enterovirus Not Detected     Influenza A PCR Not Detected     Influenza B PCR Not Detected     Parainfluenza Virus 1 Not Detected     Parainfluenza Virus 2 Not Detected     Parainfluenza Virus 3 Not Detected     Parainfluenza Virus 4 Not Detected     RSV, PCR Not Detected     Bordetella pertussis pcr Not Detected     Bordetella parapertussis PCR Not Detected     Chlamydophila pneumoniae PCR Not Detected     Mycoplasma pneumo by PCR Not Detected    Narrative:      In the setting of a positive respiratory panel with a viral infection PLUS a negative procalcitonin without other underlying concern for bacterial infection, consider observing off antibiotics or discontinuation of antibiotics and continue supportive care. If the respiratory panel is positive for atypical bacterial infection (Bordetella pertussis, Chlamydophila pneumoniae, or Mycoplasma pneumoniae), consider antibiotic de-escalation to target atypical bacterial infection.          [x]  Radiology  XR Chest 1 View  Result Date: 5/7/2025  Impression: 1.No pneumothorax following left thoracentesis. 2.Increase in left lung infiltrate. 3.Large hiatal hernia. Electronically Signed: Hamlet Barker MD  5/7/2025 12:58 PM EDT   Workstation ID: [id number]    CT Chest With Contrast Diagnostic  Result Date: 5/6/2025  1.There is masslike consolidation in the left upper lobe and lingula with septal thickening and centrilobular nodules in the left lower lobe. There is a moderate size left pleural effusion. Findings are suspicious for pneumonia. Underlying malignancy cannot be excluded. Recommend bronchoscopy directed towards the left upper lobe or lingula for evaluation. 2.There is a mass in the upper outer left breast measuring up to 1.5 cm. Diagnostic mammogram and ultrasound is recommended. 3.There is an enhancing mass in the right posterior hepatic lobe measuring 11 mm. This could be seen with a flash filling hemangioma. Metastatic disease cannot be excluded. 4.There are small sclerotic foci in the T10 and T11 vertebral bodies. These could be seen with bone islands. Metastatic disease cannot be excluded. 5.Large hernia with stomach above the diaphragm. 6.Mildly enlarged mediastinal lymph nodes could be reactive or metastatic. 7.PET/CT may be helpful for better evaluation. Electronically Signed: Alexus Dillard MD  5/6/2025 8:46 PM EDT  Workstation ID: [id number]    XR Chest 1 View  Result Date: 5/6/2025  Impression: Diffuse airspace opacity in the left lung. This is slightly progressed from previous exam. CT chest is recommended. Persistent pneumonia versus neoplastic process. Electronically Signed: Alexus Dillard MD  5/6/2025 4:54 PM EDT  Workstation ID: [id number]    []  EKG/Telemetry   []  Cardiology/Vascular   []  Pathology  []  Old records  []  Other:    Assessment & Plan        Assessment and Plan:    #Breast mass  #Pleural effusion status post thoracentesis  #Sclerotic foci T10 and T11  #Enlargement of mediastinal lymph node  #Hepatic lobe mass  Follow-up pleural fluid studies, cytology, tissue pathology   Pulmonology consulted, appreciate recommendations  Start prednisone   Will need 4 week taper at  DC    #Anxiety  Trazodone  Amitriptyline    #Hypothyroidism  Levothyroxine    #Hyperlipidemia  Atorvastatin       Discussed with RN.    VTE Prophylaxis:  Mechanical VTE prophylaxis orders are present.        CODE STATUS:   Code Status (Patient has no pulse and is not breathing): CPR (Attempt to Resuscitate)  Medical Interventions (Patient has pulse or is breathing): Full Support      Electronically signed by Michael Dahl MD, 5/8/2025, 16:16 EDT.

## 2025-05-09 ENCOUNTER — READMISSION MANAGEMENT (OUTPATIENT)
Dept: CALL CENTER | Facility: HOSPITAL | Age: 80
End: 2025-05-09
Payer: MEDICARE

## 2025-05-09 ENCOUNTER — TELEPHONE (OUTPATIENT)
Dept: PULMONOLOGY | Facility: CLINIC | Age: 80
End: 2025-05-09
Payer: MEDICARE

## 2025-05-09 VITALS
HEIGHT: 61 IN | WEIGHT: 150.35 LBS | OXYGEN SATURATION: 93 % | RESPIRATION RATE: 18 BRPM | HEART RATE: 78 BPM | BODY MASS INDEX: 28.39 KG/M2 | TEMPERATURE: 98.2 F | SYSTOLIC BLOOD PRESSURE: 98 MMHG | DIASTOLIC BLOOD PRESSURE: 68 MMHG

## 2025-05-09 PROBLEM — J18.9 PNEUMONIA: Status: RESOLVED | Noted: 2025-05-06 | Resolved: 2025-05-09

## 2025-05-09 LAB
BACTERIA SPEC AEROBE CULT: NO GROWTH
BACTERIA SPEC RESP CULT: NORMAL
CYTO UR: NORMAL
GRAM STN SPEC: NORMAL
GRAM STN SPEC: NORMAL
LAB AP CASE REPORT: NORMAL
LAB AP CLINICAL INFORMATION: NORMAL
LAB AP DIAGNOSIS COMMENT: NORMAL
LAB AP SPECIAL STAINS: NORMAL
LAB AP SPECIAL STAINS: NORMAL
PATH REPORT.FINAL DX SPEC: NORMAL
PATH REPORT.GROSS SPEC: NORMAL

## 2025-05-09 PROCEDURE — 99239 HOSP IP/OBS DSCHRG MGMT >30: CPT | Performed by: STUDENT IN AN ORGANIZED HEALTH CARE EDUCATION/TRAINING PROGRAM

## 2025-05-09 PROCEDURE — 63710000001 PREDNISONE PER 1 MG: Performed by: STUDENT IN AN ORGANIZED HEALTH CARE EDUCATION/TRAINING PROGRAM

## 2025-05-09 PROCEDURE — 99232 SBSQ HOSP IP/OBS MODERATE 35: CPT | Performed by: INTERNAL MEDICINE

## 2025-05-09 RX ORDER — HYDROCODONE POLISTIREX AND CHLORPHENIRAMINE POLISTIREX 10; 8 MG/5ML; MG/5ML
2.5 SUSPENSION, EXTENDED RELEASE ORAL EVERY 12 HOURS PRN
Qty: 30 ML | Refills: 0 | Status: SHIPPED | OUTPATIENT
Start: 2025-05-09 | End: 2025-05-13 | Stop reason: SDUPTHER

## 2025-05-09 RX ORDER — PREDNISONE 20 MG/1
TABLET ORAL
Qty: 31 TABLET | Refills: 0 | Status: SHIPPED | OUTPATIENT
Start: 2025-05-09 | End: 2025-06-04

## 2025-05-09 RX ADMIN — PREDNISONE 40 MG: 20 TABLET ORAL at 08:59

## 2025-05-09 RX ADMIN — LEVOTHYROXINE SODIUM 50 MCG: 0.05 TABLET ORAL at 06:19

## 2025-05-09 RX ADMIN — Medication 250 MG: at 09:00

## 2025-05-09 NOTE — TELEPHONE ENCOUNTER
I called pt left a message to call office back.    Per Dr. Tompkins schedule pt to see [facility] next week for hospital follow but also keep pt appt she already has scheduled to see him on the 05/27/2025.

## 2025-05-09 NOTE — PROGRESS NOTES
Enter Query Response Below      Query Response: Pneumonia ruled out              If applicable, please update the problem list.

## 2025-05-09 NOTE — PLAN OF CARE
Goal Outcome Evaluation:           Progress: improving  Outcome Evaluation: Pt rested well throughout shift. Expresses excitement for discharge tomorrow. Vital signs stable, continue plan of care.

## 2025-05-09 NOTE — DISCHARGE SUMMARY
[facility]         HOSPITALIST  DISCHARGE SUMMARY    Patient Name: Nancie Grissom  : 1945  MRN: [id number]    Date of Admission: 2025  Date of Discharge:  25  Primary Care Physician: Wen Leblanc MD    Consults       Date and Time Order Name Status Description    2025  9:06 PM Inpatient Hospitalist Consult      2025  8:50 PM General MD Inpatient Consult              Active and Resolved Hospital Problems:  Active Hospital Problems   No active problems to display.      Resolved Hospital Problems    Diagnosis POA    **Pneumonia [J18.9] Yes       Hospital Course     Hospital Course:  Nancie Grissom is a 79 y.o. female with hypertension, hypothyroidism, anxiety who presents to the emergency room with shortness of breath and cough.  Patient first had symptoms 5 weeks ago.  Finished a course of Augmentin.  Developed C. difficile diarrhea.  Cough and shortness of breath have continued.  Symptoms did not improve with azithromycin.  She was given 5 days of Rocephin.  Symptoms still did not improve.  CT chest with contrast showed masslike consolidation left upper lobe and left pleural effusion.  Started on vancomycin and cefepime.  CT also showed mass in the upper outer left breast and hepatic lobe mass.  There were also small sclerotic foci in the T10 and T11 vertebral bodies.  Large hernia and mildly enlarged mediastinal lymph nodes also appreciated. Pulmonology consulted.  Thoracentesis done on 2025.  Infectious work up of pleural fluid negative.  There is concern for malignancy.  Discontinued abx on . Started prednisone.  Patient will go home on a prolonged prednisone taper.  Patient's breathing is much improved and back to her baseline.  We spoke extensively about the concern for malignancy.  Patient needs to follow-up with pulmonology and her PCP.  Patient is medically stable for discharge home.        DISCHARGE Follow Up Recommendations for labs and diagnostics:   -  Follow-up with pulmonology, follow-up pleural fluid cytology  - Follow-up with PCP, needs outpatient PET scan      Day of Discharge     Vital Signs:  Temp:  [97.5 °F (36.4 °C)-98.8 °F (37.1 °C)] 98.2 °F (36.8 °C)  Heart Rate:  [] 78  Resp:  [16-20] 18  BP: ()/() 98/68  Flow (L/min) (Oxygen Therapy):  [0] 0  Physical Exam:   General: No acute distress  Cardiovascular: Regular rate and rhythm  Pulmonary: CTAB  Neuro: Alert and oriented    Discharge Details        Discharge Medications        New Medications        Instructions Start Date   Hydrocod Lloyd-Chlorphe Lloyd ER 10-8 MG/5ML ER suspension  Commonly known as: TUSSIONEX PENNKINETIC   2.5 mL, Oral, Every 12 Hours PRN      predniSONE 20 MG tablet  Commonly known as: DELTASONE   Take 2 tablets by mouth Daily With Breakfast for 5 days, THEN 1.5 tablets Daily With Breakfast for 7 days, THEN 1 tablet Daily With Breakfast for 7 days, THEN 0.5 tablets Daily With Breakfast for 7 days.   Start Date: May 9, 2025            Continue These Medications        Instructions Start Date   albuterol sulfate  (90 Base) MCG/ACT inhaler  Commonly known as: PROVENTIL HFA;VENTOLIN HFA;PROAIR HFA   2 puffs, Inhalation, Every 4 Hours PRN      amitriptyline 10 MG tablet  Commonly known as: ELAVIL   10 mg, Oral, Nightly      atorvastatin 20 MG tablet  Commonly known as: LIPITOR   20 mg, Oral, Nightly      Diclofenac Sodium 1 % gel gel  Commonly known as: VOLTAREN   Topical, 4 Times Daily      hydroCHLOROthiazide 25 MG tablet   25 mg, Oral, Daily      levothyroxine 50 MCG tablet  Commonly known as: Synthroid   50 mcg, Oral, Daily      lisinopril 10 MG tablet  Commonly known as: PRINIVIL,ZESTRIL   10 mg, Oral, Daily      pantoprazole 40 MG EC tablet  Commonly known as: Protonix   40 mg, Oral, Daily      traZODone 50 MG tablet  Commonly known as: DESYREL   50 mg, Oral, Nightly               Allergies   Allergen Reactions    Methotrexate Other (See Comments)     Pt  caused hair loss        Discharge Disposition:  Home or Self Care    Diet:  Hospital:  Diet Order   Procedures    Diet: Cardiac; Healthy Heart (2-3 Na+); Fluid Consistency: Thin (IDDSI 0)       Discharge Activity:   Activity Instructions       Activity as Tolerated              CODE STATUS:  Code Status and Medical Interventions: CPR (Attempt to Resuscitate); Full Support   Ordered at: 05/06/25 2117     Code Status (Patient has no pulse and is not breathing):    CPR (Attempt to Resuscitate)     Medical Interventions (Patient has pulse or is breathing):    Full Support         Future Appointments   Date Time Provider Department Center   5/21/2025  3:30 PM Wen Leblanc MD [facility]       Additional Instructions for the Follow-ups that You Need to Schedule       Discharge Follow-up with PCP   As directed       Currently Documented PCP:    Wen Leblanc MD    PCP Phone Number:    [phone]     Follow Up Details: 1 week                Pertinent  and/or Most Recent Results     PROCEDURES:   Thoracentesis on 5/7/2025    LAB RESULTS:      Lab 05/08/25  0534 05/07/25 0537 05/06/25 1925 05/06/25  1627   WBC 7.94 7.99  --  11.21*   HEMOGLOBIN 11.7* 12.5  --  14.0   HEMATOCRIT 35.2 37.5  --  41.6   PLATELETS 217 225  --  309   NEUTROS ABS 6.15 6.07  --  9.38*   IMMATURE GRANS (ABS) 0.03 0.02  --  0.04   LYMPHS ABS 0.90 0.93  --  0.95   MONOS ABS 0.60 0.70  --  0.59   EOS ABS 0.20 0.21  --  0.19   MCV 92.1 91.0  --  91.6   PROCALCITONIN  --   --  0.06  --          Lab 05/08/25  0534 05/07/25  0537 05/06/25  1627   SODIUM 136 138 137   POTASSIUM 3.8 3.9 4.2   CHLORIDE 102 103 101   CO2 24.5 23.9 24.4   ANION GAP 9.5 11.1 11.6   BUN 13 16 17   CREATININE 0.77 0.88 1.08*   EGFR 77.6 66.1 51.7*   GLUCOSE 87 91 106*   CALCIUM 8.3* 8.4* 8.9   MAGNESIUM  --   --  2.2         Lab 05/06/25  1627   TOTAL PROTEIN 7.0   ALBUMIN 3.8   GLOBULIN 3.2   ALT (SGPT) 11   AST (SGOT) 21   BILIRUBIN 0.4   ALK PHOS 72         Lab  05/06/25  1925 05/06/25  1627   HSTROP T 17* 19*                 Brief Urine Lab Results  (Last result in the past 365 days)        Color   Clarity   Blood   Leuk Est   Nitrite   Protein   CREAT   Urine HCG        05/06/25 1740 Dark Yellow   Clear   Negative   Moderate (2+)   Negative   30 mg/dL (1+)                 Microbiology Results (last 10 days)       Procedure Component Value - Date/Time    Respiratory Culture - Wash, Lung, Left Upper Lobe [[id number]] Collected: 05/07/25 1155    Lab Status: Preliminary result Specimen: Wash from Lung, Left Upper Lobe Updated: 05/08/25 1258     Respiratory Culture Scant growth (1+) The culture consists of normal respiratory lizbeth. This is a preliminary report; final report to follow.     Gram Stain Few (2+) Gram negative bacilli    AFB Culture - Body Fluid, Pleural Cavity [[id number]] Collected: 05/07/25 1152    Lab Status: Preliminary result Specimen: Body Fluid from Pleural Cavity Updated: 05/07/25 1324     AFB Stain No acid fast bacilli seen    Body Fluid Culture - Body Fluid, Pleural Cavity [[id number]] Collected: 05/07/25 1152    Lab Status: Preliminary result Specimen: Body Fluid from Pleural Cavity Updated: 05/09/25 0721     Body Fluid Culture No growth at 2 days     Gram Stain No organisms seen    Gram Stain - No Culture [[id number]] Collected: 05/07/25 1147    Lab Status: Final result Specimen: Brushing from Lung, Left Upper Lobe Updated: 05/07/25 1330     Gram Stain No organisms seen    AFB Stain - No Culture - Brushing, Lung, Left Upper Lobe [[id number]] Collected: 05/07/25 1147    Lab Status: Final result Specimen: Brushing from Lung, Left Upper Lobe Updated: 05/07/25 1318     AFB Stain No acid fast bacilli seen    AFB Culture - Lavage, Lung, Left Upper Lobe [[id number]] Collected: 05/07/25 1143    Lab Status: Preliminary result Specimen: Lavage from Lung, Left Upper Lobe Updated: 05/08/25 1522     AFB Stain No acid fast bacilli seen on direct smear      No acid fast  bacilli seen on concentrated smear    BAL Culture, Quantitative - Lavage, Lung, Left Upper Lobe [[id number]] Collected: 05/07/25 1143    Lab Status: Preliminary result Specimen: Lavage from Lung, Left Upper Lobe Updated: 05/08/25 1259     BAL Culture No growth     Gram Stain No organisms seen    Pneumonia Panel - Lavage, Lung, Left Upper Lobe [[id number]]  (Normal) Collected: 05/07/25 1143    Lab Status: Final result Specimen: Lavage from Lung, Left Upper Lobe Updated: 05/07/25 1357     Escherichia coli PCR Not Detected     Acinetobacter calcoaceticus-baumannii complex PCR Not Detected     Enterobacter cloacae PCR Not Detected     Klebsiella oxytoca PCR Not Detected     Klebsiella pneumoniae group PCR Not Detected     Klebsiella aerogenes PCR Not Detected     Moraxella catarrhalis PCR Not Detected     Proteus species PCR Not Detected     Pseudomonas aeroginosa PCR Not Detected     Serratia marcescens PCR Not Detected     Staphylococcus aureus PCR Not Detected     Streptococcus pyogenes PCR Not Detected     Haemophilus influenzae PCR Not Detected     Streptococcus agalactiae PCR Not Detected     Streptococcus pneumoniae PCR Not Detected     Chlamydophila pneumoniae PCR Not Detected     Legionella pneumophilia PCR Not Detected     Mycoplasma pneumo by PCR Not Detected     ADENOVIRUS, PCR Not Detected     CTX-M Gene N/A     IMP Gene N/A     KPC Gene N/A     mecA/C and MREJ Gene N/A     NDM Gene N/A     OXA-48-like Gene N/A     VIM Gene N/A     Coronavirus Not Detected     Human Metapneumovirus Not Detected     Human Rhinovirus/Enterovirus Not Detected     Influenza A PCR Not Detected     Influenza B PCR Not Detected     RSV, PCR Not Detected     Parainfluenza virus PCR Not Detected    MRSA Screen, PCR (Inpatient) - Swab, Nares [[id number]]  (Normal) Collected: 05/06/25 2333    Lab Status: Final result Specimen: Swab from Nares Updated: 05/07/25 0059     MRSA PCR No MRSA Detected    Narrative:      The negative predictive  value of this diagnostic test is high and should only be used to consider de-escalating anti-MRSA therapy. A positive result may indicate colonization with MRSA and must be correlated clinically.    Blood Culture - Blood, Arm, Left [[id number]]  (Normal) Collected: 05/06/25 2148    Lab Status: Preliminary result Specimen: Blood from Arm, Left Updated: 05/08/25 2200     Blood Culture No growth at 2 days    Narrative:      Less than seven (7) mL's of blood was collected.  Insufficient quantity may yield false negative results.    Blood Culture - Blood, Arm, Left [[id number]]  (Normal) Collected: 05/06/25 2123    Lab Status: Preliminary result Specimen: Blood from Arm, Left Updated: 05/08/25 2146     Blood Culture No growth at 2 days    Narrative:      Less than seven (7) mL's of blood was collected.  Insufficient quantity may yield false negative results.    COVID-19, FLU A/B, RSV PCR 1 HR TAT - Swab, Nasopharynx [[id number]]  (Normal) Collected: 05/06/25 1917    Lab Status: Final result Specimen: Swab from Nasopharynx Updated: 05/06/25 2013     COVID19 Not Detected     Influenza A PCR Not Detected     Influenza B PCR Not Detected     RSV, PCR Not Detected    Narrative:      Fact sheet for providers: https://www.fda.gov/media/942109/download    Fact sheet for patients: https://www.fda.gov/media/215523/download    Test performed by PCR.    Respiratory Panel PCR w/COVID-19(SARS-CoV-2) [facility] In-House, NP Swab in UTM/VTM, 2 HR TAT - Swab, Nasopharynx [[id number]]  (Normal) Collected: 05/06/25 1917    Lab Status: Final result Specimen: Swab from Nasopharynx Updated: 05/06/25 2022     ADENOVIRUS, PCR Not Detected     Coronavirus 229E Not Detected     Coronavirus HKU1 Not Detected     Coronavirus NL63 Not Detected     Coronavirus OC43 Not Detected     COVID19 Not Detected     Human Metapneumovirus Not Detected     Human Rhinovirus/Enterovirus Not Detected     Influenza A PCR Not Detected     Influenza B PCR Not  Detected     Parainfluenza Virus 1 Not Detected     Parainfluenza Virus 2 Not Detected     Parainfluenza Virus 3 Not Detected     Parainfluenza Virus 4 Not Detected     RSV, PCR Not Detected     Bordetella pertussis pcr Not Detected     Bordetella parapertussis PCR Not Detected     Chlamydophila pneumoniae PCR Not Detected     Mycoplasma pneumo by PCR Not Detected    Narrative:      In the setting of a positive respiratory panel with a viral infection PLUS a negative procalcitonin without other underlying concern for bacterial infection, consider observing off antibiotics or discontinuation of antibiotics and continue supportive care. If the respiratory panel is positive for atypical bacterial infection (Bordetella pertussis, Chlamydophila pneumoniae, or Mycoplasma pneumoniae), consider antibiotic de-escalation to target atypical bacterial infection.            XR Chest 1 View  Result Date: 5/7/2025  Impression: 1.No pneumothorax following left thoracentesis. 2.Increase in left lung infiltrate. 3.Large hiatal hernia. Electronically Signed: Hamlet Barker MD  5/7/2025 12:58 PM EDT  Workstation ID: [id number]    CT Chest With Contrast Diagnostic  Result Date: 5/6/2025  1.There is masslike consolidation in the left upper lobe and lingula with septal thickening and centrilobular nodules in the left lower lobe. There is a moderate size left pleural effusion. Findings are suspicious for pneumonia. Underlying malignancy cannot be excluded. Recommend bronchoscopy directed towards the left upper lobe or lingula for evaluation. 2.There is a mass in the upper outer left breast measuring up to 1.5 cm. Diagnostic mammogram and ultrasound is recommended. 3.There is an enhancing mass in the right posterior hepatic lobe measuring 11 mm. This could be seen with a flash filling hemangioma. Metastatic disease cannot be excluded. 4.There are small sclerotic foci in the T10 and T11 vertebral bodies. These could be seen with bone islands.  Metastatic disease cannot be excluded. 5.Large hernia with stomach above the diaphragm. 6.Mildly enlarged mediastinal lymph nodes could be reactive or metastatic. 7.PET/CT may be helpful for better evaluation. Electronically Signed: Alexus Dillard MD  5/6/2025 8:46 PM EDT  Workstation ID: [id number]    XR Chest 1 View  Result Date: 5/6/2025  Impression: Diffuse airspace opacity in the left lung. This is slightly progressed from previous exam. CT chest is recommended. Persistent pneumonia versus neoplastic process. Electronically Signed: Alexus Dillard MD  5/6/2025 4:54 PM EDT  Workstation ID: [id number]                   Labs Pending at Discharge:  Pending Labs       Order Current Status    Anaerobic Culture - Pleural Fluid, Pleural Cavity In process    Fungus Culture - Body Fluid, Pleural Cavity In process    Fungus Culture - Lavage, Lung, Left Upper Lobe In process    Non-gynecologic Cytology In process    Non-gynecologic Cytology In process    Tissue Pathology Exam In process    AFB Culture - Body Fluid, Pleural Cavity Preliminary result    AFB Culture - Lavage, Lung, Left Upper Lobe Preliminary result    BAL Culture, Quantitative - Lavage, Lung, Left Upper Lobe Preliminary result    Blood Culture - Blood, Arm, Left Preliminary result    Blood Culture - Blood, Arm, Left Preliminary result    Body Fluid Culture - Body Fluid, Pleural Cavity Preliminary result    Respiratory Culture - Wash, Lung, Left Upper Lobe Preliminary result              Time spent on Discharge including face to face service:  35 minutes    Electronically signed by Michael Dahl MD, 05/09/25, 8:59 AM EDT.

## 2025-05-09 NOTE — PLAN OF CARE
Goal Outcome Evaluation:                         Patient with strong family support is discharging today. She is able to make needs known. She is fully oriented.

## 2025-05-09 NOTE — OUTREACH NOTE
Prep Survey      Flowsheet Row Responses   [facility] [place] patient discharged from? [facility]   Is LACE score < 7 ? No   Eligibility [facility]   Date of Admission 05/06/25   Date of Discharge 05/09/25   Discharge diagnosis Pneumonia   Does the patient have one of the following disease processes/diagnoses(primary or secondary)? Pneumonia   Prep survey completed? Yes            Gillian DOYLE - Registered Nurse

## 2025-05-09 NOTE — PROGRESS NOTES
Pulmonary / Critical Care Progress Note      Patient Name: Nancie Grissom  : 1945  MRN: [id number]  Primary Care Physician:  Wen Leblanc MD  Date of admission: 2025    Subjective   Subjective   Follow-up for left pleural effusion, left-sided infiltrate and enlarged mediastinal lymph node.    Over past 24 hours: Remained on antibiotics.  Continued to have cough.  Continued on Tussionex.    No acute events overnight.     This morning,   Feels slightly better.  Cough improving.  Tussionex has helped.  Has no nausea or vomiting  No fever or chills.  No significant chest pain or chest tightness.      Review of Systems  General:  Fatigue, No Fever  HEENT: No dysphagia, No Visual Changes, no rhinorrhea  Respiratory:  + Dry cough,+Dyspnea, No Pleuritic Pain, + wheezing, no hemoptysis  Cardiovascular: Denies chest pain, denies palpitations,+CASTRO, No Chest Pressure  Gastrointestinal:  No Abdominal Pain, No Nausea, No Vomiting, No Diarrhea  Genitourinary:  No Dysuria, No Frequency, No Hesitancy  Musculoskeletal: No muscle pain or swelling  Neurologic:  No Confusion, no Dysarthria, No Headaches  Skin:  No Rash, No Open Wounds          Objective   Objective     Vitals:   Temp:  [97.5 °F (36.4 °C)-98.8 °F (37.1 °C)] 98.2 °F (36.8 °C)  Heart Rate:  [] 78  Resp:  [16-20] 18  BP: ()/() 98/68  Flow (L/min) (Oxygen Therapy):  [0] 0  Physical Exam   Vital Signs Reviewed   General:  WDWN, Alert, in no acute distress, has conversational dyspnea  HEENT:  PERRL, EOMI.  OP, nares clear, no sinus tenderness  Neck:  Supple, no JVD, no thyromegaly  Chest:  good aeration, clear to auscultation bilaterally, tympanic to percussion bilaterally, no work of breathing noted  CV: RRR, no MGR, pulses 2+, equal.  Abd:  Soft, NT, ND, + BS, no HSM  EXT:  no clubbing, no cyanosis, no edema  Neuro:  A&Ox3, CN grossly intact, no focal deficits.  Skin: No rashes or lesions noted      Result Review    Result Review:  I have  personally reviewed the results from the time of this admission to 5/9/2025 07:59 EDT and agree with these findings:  [x]  Laboratory  [x]  Microbiology  [x]  Radiology  [x]  EKG/Telemetry   [x]  Cardiology/Vascular   []  Pathology  []  Old records  []  Other:  Most notable findings include:         Lab 05/08/25  0534 05/07/25  0537 05/06/25  1627   WBC 7.94 7.99 11.21*   HEMOGLOBIN 11.7* 12.5 14.0   HEMATOCRIT 35.2 37.5 41.6   PLATELETS 217 225 309   SODIUM 136 138 137   POTASSIUM 3.8 3.9 4.2   CHLORIDE 102 103 101   CO2 24.5 23.9 24.4   BUN 13 16 17   CREATININE 0.77 0.88 1.08*   GLUCOSE 87 91 106*   CALCIUM 8.3* 8.4* 8.9   TOTAL PROTEIN  --   --  7.0   ALBUMIN  --   --  3.8   GLOBULIN  --   --  3.2       XR Chest 1 View  Result Date: 5/7/2025  XR CHEST 1 VW Date of Exam: 5/7/2025 12:22 PM EDT Indication: Thoracentesis Comparison: 5/6/2025 Findings: Cardiac size is stable. The right lung is clear. There is a large hiatal hernia. There is an increase in infiltrate in the left lung. There is no pneumothorax following left thoracentesis. There is no definite residual left pleural fluid.     Impression: Impression: 1.No pneumothorax following left thoracentesis. 2.Increase in left lung infiltrate. 3.Large hiatal hernia. Electronically Signed: Hamlet Barker MD  5/7/2025 12:58 PM EDT  Workstation ID: [id number]    XR Chest 1 View  Result Date: 5/6/2025  XR CHEST 1 VW Date of Exam: 5/6/2025 4:43 PM EDT Indication: Weak/Dizzy/AMS triage protocol Comparison: Chest x-ray 4/23/2025 Findings: There is persistent airspace opacity in the left lung. There is volume loss. Given the long-term persistence CT chest is recommended to evaluate for obstructing lesion or neoplasm. Right lung is clear.     Impression: Impression: Diffuse airspace opacity in the left lung. This is slightly progressed from previous exam. CT chest is recommended. Persistent pneumonia versus neoplastic process. Electronically Signed: Alexus Dillard MD  5/6/2025  4:54 PM EDT  Workstation ID: [id number]      CT Chest With Contrast Diagnostic  Result Date: 5/6/2025  CT CHEST W CONTRAST DIAGNOSTIC Date of Exam: 5/6/2025 8:32 PM EDT Indication: Persistent left lower lobe infiltrate for 5 weeks shortness of breath. Comparison: Chest x-ray 5/6/2025, 4/23/2025 Technique: Axial CT images were obtained of the chest after the uneventful intravenous administration of iodinated contrast.  Reconstructed coronal and sagittal images were also obtained. Automated exposure control and iterative construction methods were  used. Findings: Hilum and Mediastinum: There is a 11 mm short axis prevascular lymph node on image #90. Subcarinal lymph node measures 10 mm in short axis. Mild coronary artery atherosclerotic calcification. Heart size normal. No pericardial effusion. 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 No pulmonary artery emboli on this exam. Lung Parenchyma and Pleura: There is consolidation in the left upper lobe with masslike appearance and septal thickening. There is complete opacification of the lingula. There is narrowing and nonopacification of the lingula airways. There is diffuse bronchial wall thickening. There is septal thickening and small centrilobular nodules in the left lower lobe. There is a moderate size left pleural effusion. The right lung is clear. Upper Abdomen: Enhancing mass at the right posterior hepatic lobe on image 3 and 81 measures 11 mm. Soft tissues: There is a round mass in the lateral left breast. It measures 1.5 x 1.5 cm it is in the upper outer aspect, reference image 76. Osseous structures: Osteopenia. Small sclerotic foci at the superior T10 and superior T11 vertebral body. The T10 sclerotic focus measures about 5 mm. The T11 measures about 4 mm.     Impression: 1.There is masslike consolidation in the left upper lobe and lingula with septal thickening and centrilobular nodules in the left lower lobe. There is a moderate size left pleural  effusion. Findings are suspicious for pneumonia. Underlying malignancy cannot be excluded. Recommend bronchoscopy directed towards the left upper lobe or lingula for evaluation. 2.There is a mass in the upper outer left breast measuring up to 1.5 cm. Diagnostic mammogram and ultrasound is recommended. 3.There is an enhancing mass in the right posterior hepatic lobe measuring 11 mm. This could be seen with a flash filling hemangioma. Metastatic disease cannot be excluded. 4.There are small sclerotic foci in the T10 and T11 vertebral bodies. These could be seen with bone islands. Metastatic disease cannot be excluded. 5.Large hernia with stomach above the diaphragm. 6.Mildly enlarged mediastinal lymph nodes could be reactive or metastatic. 7.PET/CT may be helpful for better evaluation. 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  -Cultures and pathology pending.  - Pneumonia panel negative.  Discontinue antibiotics  -Taper prednisone slowly over a month.  Consider PET scan as an outpatient given enlarged mediastinal lymph nodes and hepatic lobe mass as well as T10/11 abnormality.  - Encourage activity as tolerated and incentive spirometer use  -Needs to follow-up with pulmonary in 1 week.  - Will need PET scan as an outpatient.    VTE Prophylaxis:  Mechanical VTE prophylaxis orders are  present.    CODE STATUS:   Code Status (Patient has no pulse and is not breathing): CPR (Attempt to Resuscitate)  Medical Interventions (Patient has pulse or is breathing): Full Support    I personally reviewed pertinent labs, imaging and provider notes. Discussed with bedside nurse and will discuss with primary service.     Electronically signed by Raleigh Tompkins MD, 5/9/2025, 07:59 EDT.      Adult

## 2025-05-10 LAB
BACTERIA FLD CULT: NORMAL
GRAM STN SPEC: NORMAL

## 2025-05-11 LAB
BACTERIA SPEC AEROBE CULT: NORMAL
BACTERIA SPEC AEROBE CULT: NORMAL

## 2025-05-11 NOTE — PROGRESS NOTES
Primary Care Provider  Wen Leblanc MD   Referring Provider  Michael Dahl MD    Patient Complaint  Hospital Follow Up Visit, fatigued, Shortness of Breath (On exertion), and Cough (Productive clear )    Patient or patient representative verbalized consent for the use of Ambient Listening during the visit with  LARRY Lakhani for chart documentation. 5/12/2025  10:25 EDT      Subjective       History of Presenting Illness  Nancie Grissom is a pleasant 79 y.o. female  of  Dr. Tompkins who presents to [facility]ULMONARY [facility]ICINE with here for hospital followup and results. Patient was at [facility] 5/6-5/9/2025. Hospital course includes the following: Nancie Grissom is a 79 y.o. female with hypertension, hypothyroidism, anxiety who presents to the emergency room with shortness of breath and cough.  Patient first had symptoms 5 weeks ago.  Finished a course of Augmentin.  Developed C. difficile diarrhea.  Cough and shortness of breath have continued.  Symptoms did not improve with azithromycin.  She was given 5 days of Rocephin.  Symptoms still did not improve.  CT chest with contrast showed masslike consolidation left upper lobe and left pleural effusion.  Started on vancomycin and cefepime.  CT also showed mass in the upper outer left breast and hepatic lobe mass.  There were also small sclerotic foci in the T10 and T11 vertebral bodies.  Large hernia and mildly enlarged mediastinal lymph nodes also appreciated. Pulmonology consulted.  Thoracentesis done on 5/7/2025.  Infectious work up of pleural fluid negative.  There is concern for malignancy.  Discontinued abx on 5/8. Started prednisone.  Patient will go home on a prolonged prednisone taper.  Patient's breathing is much improved and back to her baseline.  We spoke extensively about the concern for malignancy.  Patient needs to follow-up with pulmonology and her PCP.  Patient is medically stable for discharge home.     Patient went left  sided thoracentesis and bronchoscopy with BAL 5/7/2025 due to moderate left-sided pleural effusion and pneumonia of the left upper lobe.  600 mL was removed with pleural fluid.  Bronchoscopy findings revealed friable mucosa, easy bleeding with suction and air with induration.      Cytology from left upper lobe lung BAL positive for malignant cells, adenocarcinoma.  Left upper lobe lung with bronchial brushings positive for malignant cells adenocarcinoma.  Pathology from the left upper lobe lung biopsy adenocarcinoma lung primary.  Pneumonia panel negative, BAL negative, bronchial wash differential negative, BAL culture no growth, AFB culture no growth to date.    Cytology from the left pleural fluid revealed metastatic adenocarcinoma lung primary.    Metal:  Oxygen:  Meds:  History of Present Illness  The patient is an 79-year-old female who presents for a hospital follow-up and to discuss pathology results. She is accompanied by her sons and daughter-in-law.    During her recent hospitalization, she underwent fluid drainage and a biopsy, which yielded positive results for cancer. She was informed that the cancer had metastasized to her spine. She reports no presence of any metallic implants or stents in her body. She has never engaged in smoking or alcohol consumption. However, she has been exposed to secondhand smoke from her , father, and brothers. She has a scheduled appointment with her primary care physician tomorrow, which she plans to attend.    She experienced ankle swelling yesterday, a symptom she has not previously encountered. She is uncertain if this could be a side effect of the steroids she is currently taking.    She has a known heart murmur and stenosis but has not consulted a cardiologist. She recalls a previous consultation with a cardiologist who informed her of blood on the outside of her heart valves, but she did not pursue further follow-up.    She sought medical attention at an urgent  [facility] facility on 05/13/2025 due to persistent coughing. The attending physician diagnosed her with pneumonia based on auscultation and prescribed medication. She subsequently visited her primary care physician, Dr. Carver, who ordered an x-ray. Despite the medication, her condition did not improve, prompting her to seek emergency care. She received five injections of Rocephin. A follow-up chest x-ray revealed diffuse, slightly progressive changes, leading to a CT scan that confirmed the presence of cancer.     SOCIAL HISTORY  She does not smoke or drink alcohol.     At present time patient denies wheezing, headaches, chest pain, weight loss or hemoptysis. Patient denies fevers, chills and night sweats. Nancie Grissom is able to perform ADLs.      I have personally reviewed the review of systems, past family, social, medical and surgical histories; and agree with their findings.      Review of Systems   Constitutional: Negative.    HENT: Negative.     Respiratory:  Positive for cough and shortness of breath.    Cardiovascular: Negative.    Musculoskeletal: Negative.    Neurological: Negative.    Psychiatric/Behavioral: Negative.           Family History   Problem Relation Age of Onset    Cancer Father     Asthma Father         Social History     Socioeconomic History    Marital status:    Tobacco Use    Smoking status: Never    Smokeless tobacco: Never   Vaping Use    Vaping status: Never Used   Substance and Sexual Activity    Alcohol use: Yes     Comment: rarely    Drug use: Never    Sexual activity: Defer        Past Medical History:   Diagnosis Date    Arthritis     Depression     GERD (gastroesophageal reflux disease) 02/12/2015    Hyperlipidemia     Hypertension     Hypothyroidism     Osteoporosis     Vitamin D deficiency         Immunization History   Administered Date(s) Administered    COVID-19 (CINTHIA) 04/05/2021    Flu Vaccine Quad PF >36MO 02/09/2018    Fluzone High-Dose 65+YRS 01/02/2025    Fluzone  "High-Dose 65+yrs 10/26/2022, 10/17/2023    Influenza, Unspecified 10/21/2019    Pneumococcal Conjugate 20-Valent (PCV20) 10/17/2023    Pneumococcal Polysaccharide (PPSV23) 08/12/2014       Allergies   Allergen Reactions    Methotrexate Other (See Comments)     Pt caused hair loss           Current Outpatient Medications:     albuterol sulfate  (90 Base) MCG/ACT inhaler, Inhale 2 puffs Every 4 (Four) Hours As Needed for Wheezing., Disp: 18 g, Rfl: 0    amitriptyline (ELAVIL) 10 MG tablet, Take 1 tablet by mouth Every Night., Disp: 90 tablet, Rfl: 1    atorvastatin (LIPITOR) 20 MG tablet, Take 1 tablet by mouth Every Night., Disp: 90 tablet, Rfl: 1    Diclofenac Sodium (VOLTAREN) 1 % gel gel, Apply  topically to the appropriate area as directed 4 (Four) Times a Day., Disp: 150 g, Rfl: 3    hydroCHLOROthiazide 25 MG tablet, Take 1 tablet by mouth Daily., Disp: 90 tablet, Rfl: 1    Hydrocod Lloyd-Chlorphe Lloyd ER (TUSSIONEX PENNKINETIC) 10-8 MG/5ML ER suspension, Take 2.5 mL by mouth Every 12 (Twelve) Hours As Needed for Cough for up to 6 days., Disp: 30 mL, Rfl: 0    levothyroxine (Synthroid) 50 MCG tablet, Take 1 tablet by mouth Daily., Disp: 90 tablet, Rfl: 1    lisinopril (PRINIVIL,ZESTRIL) 10 MG tablet, Take 1 tablet by mouth Daily., Disp: 90 tablet, Rfl: 1    pantoprazole (Protonix) 40 MG EC tablet, Take 1 tablet by mouth Daily., Disp: 90 tablet, Rfl: 1    predniSONE (DELTASONE) 20 MG tablet, Take 2 tablets by mouth Daily With Breakfast for 5 days, THEN 1.5 tablets Daily With Breakfast for 7 days, THEN 1 tablet Daily With Breakfast for 7 days, THEN 0.5 tablets Daily With Breakfast for 7 days., Disp: 31 tablet, Rfl: 0    traZODone (DESYREL) 50 MG tablet, Take 1 tablet by mouth Every Night., Disp: 90 tablet, Rfl: 1         Vital Signs   /97 (BP Location: Left arm, Patient Position: Sitting, Cuff Size: Adult)   Pulse 100   Temp 97.8 °F (36.6 °C)   Resp 16   Ht 154.9 cm (60.98\")   Wt 68 kg (150 lb)   " SpO2 96%   BMI 28.36 kg/m²       Objective     Physical Exam  Vitals reviewed.   Constitutional:       General: She is not in acute distress.     Appearance: Normal appearance. She is not ill-appearing.   HENT:      Head: Normocephalic and atraumatic.      Nose: Nose normal.      Mouth/Throat:      Mouth: Mucous membranes are moist.      Pharynx: Oropharynx is clear.   Eyes:      Extraocular Movements: Extraocular movements intact.      Conjunctiva/sclera: Conjunctivae normal.      Pupils: Pupils are equal, round, and reactive to light.   Cardiovascular:      Rate and Rhythm: Normal rate and regular rhythm.      Pulses: Normal pulses.      Heart sounds: Murmur heard.   Pulmonary:      Effort: Pulmonary effort is normal. No respiratory distress.      Breath sounds: Normal breath sounds. No stridor. No wheezing, rhonchi or rales.   Abdominal:      General: Bowel sounds are normal.   Musculoskeletal:         General: Normal range of motion.      Cervical back: Normal range of motion and neck supple.      Right lower leg: Edema present.      Left lower leg: Edema present.   Skin:     General: Skin is warm and dry.   Neurological:      Mental Status: She is alert and oriented to person, place, and time.   Psychiatric:         Behavior: Behavior normal.         Physical Exam  Lungs were auscultated.  Heart was examined.         Results Review  I have personally reviewed the prior office notes, hospital records, labs, and diagnostics.  Non-gynecologic Cytology: [id number]  Order: [id number]   Status: Final result       Next appt: 05/12/2025 at 10:15 AM in Pulmonology (LARRY Lakhani)    Test Result Released: No (scheduled for 5/12/2025  3:46 PM)    Specimen Information: Pleural Cavity; Pleural Fluid   0 Result Notes      Component  Ref Range & Units (hover)    Case Report   Medical Cytology Report                           Case: [id number]                                   Authorizing Provider:  Raleigh Tompkins MD          Collected:           05/07/2025 12:13 PM           Ordering Location:     [facility] MAIN Received:            05/08/2025 10:39 AM                                  OR                                                                           Pathologist:           Jonathan Mosquera MD                                                             Specimen:    Pleural Cavity                                                                            Final Diagnosis   Pleural fluid, left, thoracentesis:                - Metastatic adenocarcinoma, lung primary     The above positive (malignant) diagnosis was called to Gi Trivedi RN in Dr. UZMA Tompkins's office at 15:22 EDT on 5/9/2025 by Farhad TREVINO       Electronically signed by Jonathan Mosquera MD on 5/9/2025 at 1546 EDT   Clinical Information    Moderate left sided pleural effusion. Hepatic lobe mass. Mass of left breast. Enlarged mediastinal lymph nodes, reactive versus metastatic.   Comment    Per policy, reflex testing for PD-L1, EGFR, ALK and ROS1 has been ordered, and the results will be separately reported.      Gross Description    1. Pleural Cavity.  Pleural Fluid, Left  600 cc of angel, slightly bloody, fibrinous fluid received (1 cytospin prep and a cell block prepared).     Cell block placed in formalin: 13:26 EDT 5/8/2025, Aaliyah PHILLIPS   Microscopic Description    Microscopic examination performed.   Special Stains    Immunohistochemical stains are performed to further characterize tumor cells.  Tumor cells are positive for CK7, Napsin A, TTF-1, Roosevelt-EP4, and MOC 31.  Tumor cells are negative for p40, calretinin, WT1, arginase, CDX2, GATA3, HepPar1, and PAX8.  These immunohistochemical features are consistent with the above diagnosis.     All immunohistochemical/cytochemical stains (IHC) are performed on separate slides per different antibody unless otherwise specified in the documentation that a cocktail (multiple stain) was performed.  Controls are  appropriate.   Resulting Agency [facility]             Specimen Collected: 05/07/25 12:13 EDT Last Resulted: 05/09/25 15:46 EDT     Non-gynecologic Cytology: [id number]  Order: [id number]   Status: Final result       Next appt: 05/12/2025 at 10:15 AM in Pulmonology (Aurora Mukherjee, LARRY)       Dx: Pneumonia of left upper lobe due to i...    Test Result Released: No (scheduled for 5/12/2025  3:47 PM)    Specimen Information: A: Lung, Left Upper Lobe; Lavage    B: Lung, Left Upper Lobe; Brushing   0 Result Notes      Component  Ref Range & Units (hover)    Case Report   Medical Cytology Report                           Case: [id number]                                   Authorizing Provider:  Raleigh Tompkins MD         Collected:           05/07/2025 11:43 AM           Ordering Location:     [facility] MAIN Received:            05/08/2025 10:11 AM                                  OR                                                                           Pathologist:           Jonathan Mosquera MD                                                             Specimens:   1) - Lung, Left Upper Lobe, ALEJANDRINA BAL                                                                  2) - Lung, Left Upper Lobe, BRUSHINGS                                                      Final Diagnosis   1. Lung, left upper lobe, BAL:               - Positive for malignant cells               - Adenocarcinoma        2. Lung, left upper lobe, bronchial brushing:               - Positive for malignant cells               - Adenocarcinoma     The above positive (malignant) diagnosis was called to Eleni Mata RN in Dr. UZMA Tompkins's office at 15:00 on 5/9/2025 by Farhad TREVINO    Electronically signed by Jonathan Mosquera MD on 5/9/2025 at 1547 EDT   Clinical Information    Pre-Operative Diagnosis:  Left upper lobe infiltrate, left-sided pleural effusion.  Post-Operative Diagnosis:  Left upper lobe indurated airway.   Comment    Please also see correlating  biopsy case [id number] and correlating cytology case [id number].   Gross Description    1. Lung, Left Upper Lobe.  BAL, Left Upper Lobe   21 cc of thin, slightly bloody fluid received (1 cytospin prep prepared).     2. Lung, Left Upper Lobe.   Bronchial Brushings, Left Upper Lobe   2 prepared smears received in ETOH.         Microscopic Description    Microscopic examination performed.   Resulting Agency [facility]             Specimen Collected: 05/07/25 11:43 EDT Last Resulted: 05/09/25 15:47 EDT   Tissue Pathology Exam: [id number]  Order: [id number]   Status: Final result       Next appt: 05/12/2025 at 10:15 AM in Pulmonology (LARRY Lakhani)       Dx: Pneumonia of left upper lobe due to i...    Test Result Released: No (scheduled for 5/12/2025  3:46 PM)    Specimen Information: Lung, Left Upper Lobe; Tissue   0 Result Notes      Component  Ref Range & Units (hover)    Case Report   Surgical Pathology Report                         Case: [id number]                                   Authorizing Provider:  Raleigh Tompkins MD         Collected:           05/07/2025 11:49 AM           Ordering Location:     [facility] MAIN Received:            05/08/2025 09:59 AM                                  OR                                                                           Pathologist:           Jonathan Mosquera MD                                                             Specimen:    Lung, Left Upper Lobe, ALEJANDRINA BIOPSY                                                          Clinical Information    Pneumonia of left upper lobe due to infectious organism   Final Diagnosis   Lung, left upper lobe, biopsy:               - Adenocarcinoma, lung primary     The above positive (malignant) diagnosis was called to Eleni Mata RN in Dr. UZMA Tompkins's office at 15:00 on 5/9/2025 by Farhad TREVINO    Electronically signed by Jonathan Mosquera MD on 5/9/2025 at 1545 EDT   Comment    Please also see correlating cytology cases  "[id number] and [id number].   Gross Description    1. Lung, Left Upper Lobe.  Received in formalin and labeled \" left upper lobe\" is a 0.3 cm aggregate of tan soft tissue fragments. The specimen is entirely submitted in one cassette.   CARLY   Special Stains    Immunohistochemical stains are performed to further characterize tumor cells.  Tumor cells are positive for CK7 and Napsin A with partial positivity for TTF-1.  Tumor cells are negative for p40 and CK5/6.  These immunohistochemical features are consistent with the above diagnosis.     All immunohistochemical/cytochemical stains (IHC) are performed on separate slides per different antibody unless otherwise specified in the documentation that a cocktail (multiple stain) was performed.  Controls are appropriate.   Microscopic Description    Microscopic examination performed.   [facility]             Specimen Collected: 05/07/25 11:49 EDT Last Resulted: 05/09/25 15:46 EDT     CT Chest With Contrast Diagnostic [ONH911] (Order [id number])  Order  Status: Final result     Study Notes     Cleveland Ott on 5/6/2025  8:35 PM EDT   SOB, no prior chest surgery.  /wbb  Isovue 370 70 ml  CTDI  2.60  DLP  95     Appointment Information    PACS Images     Radiology Images  Study Result    Narrative & Impression   CT CHEST W CONTRAST DIAGNOSTIC     Date of Exam: 5/6/2025 8:32 PM EDT     Indication: Persistent left lower lobe infiltrate for 5 weeks  shortness of breath.     Comparison: Chest x-ray 5/6/2025, 4/23/2025     Technique: Axial CT images were obtained of the chest after the uneventful intravenous administration of iodinated contrast.  Reconstructed coronal and sagittal images were also obtained. Automated exposure control and iterative construction methods were   used.        Findings:  Hilum and Mediastinum: There is a 11 mm short axis prevascular lymph node on image #90. Subcarinal lymph node measures 10 mm in short axis. Mild coronary artery " atherosclerotic calcification. Heart size normal. No pericardial effusion. Large hernia with   stomach above the diaphragm. No pulmonary artery emboli on this exam.     Lung Parenchyma and Pleura: There is consolidation in the left upper lobe with masslike appearance and septal thickening. There is complete opacification of the lingula. There is narrowing and nonopacification of the lingula airways. There is diffuse   bronchial wall thickening. There is septal thickening and small centrilobular nodules in the left lower lobe. There is a moderate size left pleural effusion. The right lung is clear.     Upper Abdomen: Enhancing mass at the right posterior hepatic lobe on image 3 and 81 measures 11 mm.     Soft tissues: There is a round mass in the lateral left breast. It measures 1.5 x 1.5 cm it is in the upper outer aspect, reference image 76.     Osseous structures: Osteopenia. Small sclerotic foci at the superior T10 and superior T11 vertebral body. The T10 sclerotic focus measures about 5 mm. The T11 measures about 4 mm.     IMPRESSION:  1.There is masslike consolidation in the left upper lobe and lingula with septal thickening and centrilobular nodules in the left lower lobe. There is a moderate size left pleural effusion. Findings are suspicious for pneumonia. Underlying malignancy   cannot be excluded. Recommend bronchoscopy directed towards the left upper lobe or lingula for evaluation.  2.There is a mass in the upper outer left breast measuring up to 1.5 cm. Diagnostic mammogram and ultrasound is recommended.  3.There is an enhancing mass in the right posterior hepatic lobe measuring 11 mm. This could be seen with a flash filling hemangioma. Metastatic disease cannot be excluded.  4.There are small sclerotic foci in the T10 and T11 vertebral bodies. These could be seen with bone islands. Metastatic disease cannot be excluded.  5.Large hernia with stomach above the diaphragm.  6.Mildly enlarged mediastinal  lymph nodes could be reactive or metastatic.  7.PET/CT may be helpful for better evaluation.              Electronically Signed: Alexus Dillard MD    5/6/2025 8:46 PM EDT    Workstation ID: [id number]       Results  Laboratory Studies  Biopsy results came back positive for cancer.    Imaging  CT scan of the lungs shows mild plaque build up in the coronary artery vessels. X-ray on 04/23/2025 showed left-sided pneumonia with a small left pleural effusion.          Assessment         Patient Active Problem List   Diagnosis    Depression    Esophageal reflux    Hyperlipidemia    Hypertension    Hypothyroidism    Osteoporosis    Vitamin D deficiency    Annual physical exam    Rheumatoid arthritis involving both hands with positive rheumatoid factor    Insomnia    Stage 3a chronic kidney disease        Plan     Diagnoses and all orders for this visit:    1. Primary malignant neoplasm of left lung metastatic to other site (Primary)  -     MRI Brain With & Without Contrast; Future  -     NM PET/CT Skull Base to Mid Thigh; Future  -     Ambulatory Referral to Radiation Oncology  -     Ambulatory Referral to Oncology    2. Pleural effusion  -     MRI Brain With & Without Contrast; Future  -     NM PET/CT Skull Base to Mid Thigh; Future  -     Ambulatory Referral to Radiation Oncology  -     Ambulatory Referral to Oncology    3. Mediastinal lymphadenopathy  -     MRI Brain With & Without Contrast; Future  -     NM PET/CT Skull Base to Mid Thigh; Future  -     Ambulatory Referral to Radiation Oncology  -     Ambulatory Referral to Oncology    4. Adenocarcinoma, lung, left  -     MRI Brain With & Without Contrast; Future  -     NM PET/CT Skull Base to Mid Thigh; Future  -     Ambulatory Referral to Radiation Oncology  -     Ambulatory Referral to Oncology    5. Other nonspecific abnormal finding of lung field  -     NM PET/CT Skull Base to Mid Thigh; Future         Assessment & Plan  1. Adenocarcinoma.  The patient has been diagnosed  with adenocarcinoma, a type of non-small cell lung cancer, located in the left upper lobe. Orders have been placed for an MRI of the brain with and without contrast to assess for potential metastasis to the brain. Additionally, a PET scan from the skull to mid-thigh has been ordered to evaluate for any cancerous cells in other organs. Referrals to oncology and radiation oncology have been made for further evaluation and treatment planning. She will be contacted with the results of the MRI and PET scan once they are available.    2. Ankle swelling.  The swelling could be attributed to steroid use, recent hospitalization, fluid accumulation, or congestive heart failure. She is advised to maintain her follow-up appointments with her primary care provider to monitor her fluid status.    3. Heart murmur and stenosis.  A CT scan of the lungs revealed mild plaque buildup in the coronary artery vessels, which is age-appropriate. She has a known heart murmur and potential stenosis. It is recommended that she undergo a cardiac workup, including an echocardiogram, to assess the severity of the murmur and stenosis. Her family provider should facilitate these diagnostics or refer her to a cardiologist.    4. Pneumonia.  She was previously treated for pneumonia with levofloxacin and Rocephin injections. A follow-up chest x-ray showed persistent pneumonia versus a neoplastic process, leading to a CT scan that confirmed the presence of cancer. She should continue to monitor her respiratory symptoms and follow up with her healthcare provider as needed.    Follow-up  The patient will follow up in 2 to 3 months.    PROCEDURE  The patient underwent fluid drainage and a biopsy during her recent hospitalization, which yielded positive results for cancer.      Smoking status:  reports that she has never smoked. She has never used smokeless tobacco.    Vaccination status: Reviewed  Immunization History   Administered Date(s) Administered     COVID-19 (CINTHIA) 04/05/2021    Flu Vaccine Quad PF >36MO 02/09/2018    Fluzone High-Dose 65+YRS 01/02/2025    Fluzone High-Dose 65+yrs 10/26/2022, 10/17/2023    Influenza, Unspecified 10/21/2019    Pneumococcal Conjugate 20-Valent (PCV20) 10/17/2023    Pneumococcal Polysaccharide (PPSV23) 08/12/2014        Medications personally reviewed    Follow Up  Return in about 2 months (around 7/12/2025).    Patient was given instructions and counseling regarding her condition or for health maintenance advice. Please see specific information pulled into the AVS if appropriate.     I spent 18 minutes caring for Nancie Grissom on this date of service. This time includes time spent by me in the following activities:preparing for the visit, reviewing tests, obtaining and/or reviewing a separately obtained history, performing a medically appropriate examination and/or evaluation, counseling and educating the patient/family/caregiver, ordering medications, tests, or procedures, documenting information in the medical record, independently interpreting results and communicating that information with the patient/family/caregiver and answered questions family members, discuss medications.

## 2025-05-12 ENCOUNTER — TRANSITIONAL CARE MANAGEMENT TELEPHONE ENCOUNTER (OUTPATIENT)
Dept: CALL CENTER | Facility: HOSPITAL | Age: 80
End: 2025-05-12
Payer: MEDICARE

## 2025-05-12 ENCOUNTER — OFFICE VISIT (OUTPATIENT)
Dept: PULMONOLOGY | Facility: CLINIC | Age: 80
End: 2025-05-12
Payer: MEDICARE

## 2025-05-12 VITALS
DIASTOLIC BLOOD PRESSURE: 97 MMHG | RESPIRATION RATE: 16 BRPM | OXYGEN SATURATION: 96 % | SYSTOLIC BLOOD PRESSURE: 136 MMHG | HEART RATE: 100 BPM | BODY MASS INDEX: 28.32 KG/M2 | HEIGHT: 61 IN | TEMPERATURE: 97.8 F | WEIGHT: 150 LBS

## 2025-05-12 DIAGNOSIS — R91.8 OTHER NONSPECIFIC ABNORMAL FINDING OF LUNG FIELD: ICD-10-CM

## 2025-05-12 DIAGNOSIS — J90 PLEURAL EFFUSION: ICD-10-CM

## 2025-05-12 DIAGNOSIS — C34.92 ADENOCARCINOMA, LUNG, LEFT: ICD-10-CM

## 2025-05-12 DIAGNOSIS — C34.92 PRIMARY MALIGNANT NEOPLASM OF LEFT LUNG METASTATIC TO OTHER SITE: Primary | ICD-10-CM

## 2025-05-12 DIAGNOSIS — R59.0 MEDIASTINAL LYMPHADENOPATHY: ICD-10-CM

## 2025-05-12 LAB — BACTERIA SPEC ANAEROBE CULT: NORMAL

## 2025-05-12 NOTE — OUTREACH NOTE
Call Center TCM Note      Flowsheet Row Responses   [facility] patient discharged from? [facility]   Does the patient have one of the following disease processes/diagnoses(primary or secondary)? Pneumonia   TCM attempt successful? Yes   Call start time 0825   Call end time 0827   Discharge diagnosis Pneumonia   Meds reviewed with patient/caregiver? Yes   Is the patient having any side effects they believe may be caused by any medication additions or changes? No   Does the patient have all medications ordered at discharge? Yes   Is the patient taking all medications as directed (includes completed medication regime)? Yes   Comments HOSP DC FU appt 5/12/25 1015 am   Does the patient have an appointment with their PCP within 7-14 days of discharge? Yes   Has home health visited the patient within 72 hours of discharge? N/A   Psychosocial issues? No   Did the patient receive a copy of their discharge instructions? Yes   Nursing interventions Reviewed instructions with patient   What is the patient's perception of their health status since discharge? Improving   Nursing Interventions Nurse provided patient education   If the patient is a current smoker, are they able to teach back resources for cessation? Not a smoker   Is the patient/caregiver able to teach back the hierarchy of who to call/visit for symptoms/problems? PCP, Specialist, Home health nurse, Urgent Care, ED, 911 Yes   Is the patient/caregiver able to teach back signs and symptoms of worsening condition: Fever/chills, Shortness of breath, Chest pain   TCM call completed? Yes   Wrap up additional comments Son reports that pt breathing is her normal. Pt doing ok. Son worries about th epossible caner and will talk with  this morning about additional testing needs.   Call end time 0827            EVAN JEAN - Registered Nurse    5/12/2025, 08:28 EDT

## 2025-05-13 ENCOUNTER — HOSPITAL ENCOUNTER (OUTPATIENT)
Dept: MRI IMAGING | Facility: HOSPITAL | Age: 80
Discharge: HOME | End: 2025-05-13
Admitting: NURSE PRACTITIONER
Payer: MEDICARE

## 2025-05-13 ENCOUNTER — OFFICE VISIT (OUTPATIENT)
Dept: INTERNAL MEDICINE | Facility: CLINIC | Age: 80
End: 2025-05-13
Payer: MEDICARE

## 2025-05-13 ENCOUNTER — TELEPHONE (OUTPATIENT)
Dept: CASE MANAGEMENT | Facility: OTHER | Age: 80
End: 2025-05-13
Payer: MEDICARE

## 2025-05-13 VITALS
WEIGHT: 140.9 LBS | TEMPERATURE: 96.6 F | HEART RATE: 83 BPM | OXYGEN SATURATION: 96 % | BODY MASS INDEX: 26.64 KG/M2 | SYSTOLIC BLOOD PRESSURE: 128 MMHG | RESPIRATION RATE: 18 BRPM | DIASTOLIC BLOOD PRESSURE: 80 MMHG

## 2025-05-13 DIAGNOSIS — R59.0 MEDIASTINAL LYMPHADENOPATHY: ICD-10-CM

## 2025-05-13 DIAGNOSIS — Z12.31 ENCOUNTER FOR SCREENING MAMMOGRAM FOR MALIGNANT NEOPLASM OF BREAST: ICD-10-CM

## 2025-05-13 DIAGNOSIS — Z98.890 HISTORY OF THORACENTESIS: ICD-10-CM

## 2025-05-13 DIAGNOSIS — D49.3 NEOPLASM OF UNSPECIFIED BEHAVIOR OF BREAST: ICD-10-CM

## 2025-05-13 DIAGNOSIS — C34.92 ADENOCARCINOMA, LUNG, LEFT: ICD-10-CM

## 2025-05-13 DIAGNOSIS — C34.92 PRIMARY MALIGNANT NEOPLASM OF LEFT LUNG METASTATIC TO OTHER SITE: ICD-10-CM

## 2025-05-13 DIAGNOSIS — Z09 HOSPITAL DISCHARGE FOLLOW-UP: Primary | ICD-10-CM

## 2025-05-13 DIAGNOSIS — J90 PLEURAL EFFUSION: ICD-10-CM

## 2025-05-13 DIAGNOSIS — N63.20 MASS OF LEFT BREAST, UNSPECIFIED QUADRANT: ICD-10-CM

## 2025-05-13 DIAGNOSIS — R16.0 LIVER MASS: ICD-10-CM

## 2025-05-13 PROCEDURE — 25510000002 GADOBENATE DIMEGLUMINE 529 MG/ML SOLUTION: Performed by: NURSE PRACTITIONER

## 2025-05-13 PROCEDURE — 70553 MRI BRAIN STEM W/O & W/DYE: CPT

## 2025-05-13 PROCEDURE — A9577 INJ MULTIHANCE: HCPCS | Performed by: NURSE PRACTITIONER

## 2025-05-13 RX ORDER — HYDROCODONE POLISTIREX AND CHLORPHENIRAMINE POLISTIREX 10; 8 MG/5ML; MG/5ML
2.5 SUSPENSION, EXTENDED RELEASE ORAL EVERY 12 HOURS PRN
Qty: 30 ML | Refills: 0 | Status: SHIPPED | OUTPATIENT
Start: 2025-05-13 | End: 2025-05-20

## 2025-05-13 RX ADMIN — GADOBENATE DIMEGLUMINE 13 ML: 529 INJECTION, SOLUTION INTRAVENOUS at 15:37

## 2025-05-13 NOTE — PROGRESS NOTES
"Transitional Care Follow Up Visit  Subjective     Nancie Grissom is a 79 y.o. female who presents for a transitional care management visit.    Within 48 business hours after discharge our office contacted her via telephone to coordinate her care and needs.      I reviewed and discussed the details of that call along with the discharge summary, hospital problems, inpatient lab results, inpatient diagnostic studies, and consultation reports with Nancie.     Current outpatient and discharge medications have been reconciled for the patient.  Reviewed by: Wen Leblanc MD          5/9/2025     1:51 PM   Date of TCM Phone Call   [facility]   Date of Admission 5/6/2025   Date of Discharge 5/9/2025     Risk for Readmission (LACE) No data recorded      History of Present Illness   Course During Hospital Stay:  5/6-5/9    Pt admitted for dyspnea and cough. This was in the setting of pt getting treated for CAP x2  (Augmentin, then rocephin) and c-diff  as well in the weeks preceding her admission. CT chest w/ contrast on presentation showed:  \"IMPRESSION:  1.There is masslike consolidation in the left upper lobe and lingula with septal thickening and centrilobular nodules in the left lower lobe. There is a moderate size left pleural effusion. Findings are suspicious for pneumonia. Underlying malignancy   cannot be excluded. Recommend bronchoscopy directed towards the left upper lobe or lingula for evaluation.  2.There is a mass in the upper outer left breast measuring up to 1.5 cm. Diagnostic mammogram and ultrasound is recommended.  3.There is an enhancing mass in the right posterior hepatic lobe measuring 11 mm. This could be seen with a flash filling hemangioma. Metastatic disease cannot be excluded.  4.There are small sclerotic foci in the T10 and T11 vertebral bodies. These could be seen with bone islands. Metastatic disease cannot be excluded.  5.Large hernia with stomach above the " "diaphragm.  6.Mildly enlarged mediastinal lymph nodes could be reactive or metastatic.  7.PET/CT may be helpful for better evaluation.\"    Patient was treated with vacomycin and cefepime. Pulmonology was consulted and BAL  was done on 7/7. Infectious workup was negative and antibiotics were discontinued on 5/8 and pt started on prednisone. Fluid cytology studies pending at time of discharge. Pt was dc home with plan for prolonged steroid taper managed by pulmonology. Deferred work up for multiple massess concerning for malignancy to be done outpatient.     Today patient reports that her cough persists. She is requesting refill for her cough medication.   She is accompanied by her two sons.     Pt last seen by pulmonology on 5/12. Results of fluid studies from BAL of left upper lobe shows + malignant cells, adenocarcinoma.      The following portions of the patient's history were reviewed and updated as appropriate: allergies, current medications, past family history, past medical history, past social history, past surgical history, and problem list.    Review of Systems    Objective   /80 (BP Location: Left arm, Patient Position: Sitting, Cuff Size: Adult)   Pulse 83   Temp 96.6 °F (35.9 °C) (Temporal)   Resp 18   Wt 63.9 kg (140 lb 14.4 oz)   SpO2 96%   BMI 26.64 kg/m²   Physical Exam  Vitals reviewed.   Constitutional:       Appearance: Normal appearance.   HENT:      Head: Normocephalic and atraumatic.      Nose: Nose normal.   Eyes:      Extraocular Movements: Extraocular movements intact.      Conjunctiva/sclera: Conjunctivae normal.   Cardiovascular:      Rate and Rhythm: Normal rate and regular rhythm.      Pulses: Normal pulses.      Heart sounds: Normal heart sounds.   Pulmonary:      Effort: Pulmonary effort is normal. No respiratory distress.      Breath sounds: Normal breath sounds.   Musculoskeletal:         General: Normal range of motion.   Skin:     General: Skin is warm and dry. "   Neurological:      General: No focal deficit present.      Mental Status: She is alert and oriented to person, place, and time.      Cranial Nerves: No cranial nerve deficit.   Psychiatric:         Mood and Affect: Mood normal.         Behavior: Behavior normal.         Thought Content: Thought content normal.         Assessment & Plan     Diagnoses and all orders for this visit:    1. Hospital discharge follow-up (Primary)    2. Mass of left breast, unspecified quadrant  -     Hydrocod Lloyd-Chlorphe Lloyd ER (TUSSIONEX PENNKINETIC) 10-8 MG/5ML ER suspension; Take 2.5 mL by mouth Every 12 (Twelve) Hours As Needed for Cough for up to 6 days.  Dispense: 30 mL; Refill: 0  -     Mammo Screening Modified Left With CAD  -     US Breast Left Limited    3. Liver mass  -     Hydrocod Lloyd-Chlorphe Lloyd ER (TUSSIONEX PENNKINETIC) 10-8 MG/5ML ER suspension; Take 2.5 mL by mouth Every 12 (Twelve) Hours As Needed for Cough for up to 6 days.  Dispense: 30 mL; Refill: 0    4. History of thoracentesis  -     Hydrocod Lloyd-Chlorphe Lloyd ER (TUSSIONEX PENNKINETIC) 10-8 MG/5ML ER suspension; Take 2.5 mL by mouth Every 12 (Twelve) Hours As Needed for Cough for up to 6 days.  Dispense: 30 mL; Refill: 0    5. Encounter for screening mammogram for malignant neoplasm of breast  -     Mammo Screening Modified Left With CAD  -     US Breast Left Limited    6. Neoplasm of unspecified behavior of breast  -     US Breast Left Limited        Patient presenting for hospital discharge f/u. Pt admitted for worsening cough with imaging findings concerning for malignancy which was confirmed on BAL studies. Imaging studies also showed mass of left breast and liver. Patient is following with pulmonology. Her first appointment with oncology is scheduled for next week, 5/20/25. Mammogram ordered for further eval. Refilled cough medication as requested.

## 2025-05-14 ENCOUNTER — PATIENT OUTREACH (OUTPATIENT)
Dept: CASE MANAGEMENT | Facility: OTHER | Age: 80
End: 2025-05-14
Payer: MEDICARE

## 2025-05-14 DIAGNOSIS — I10 PRIMARY HYPERTENSION: ICD-10-CM

## 2025-05-14 DIAGNOSIS — N18.31 STAGE 3A CHRONIC KIDNEY DISEASE: ICD-10-CM

## 2025-05-14 DIAGNOSIS — E78.5 HYPERLIPIDEMIA, UNSPECIFIED HYPERLIPIDEMIA TYPE: ICD-10-CM

## 2025-05-14 DIAGNOSIS — C80.1 CANCER: Primary | ICD-10-CM

## 2025-05-14 LAB
FUNGUS WND CULT: NORMAL
FUNGUS WND CULT: NORMAL
MYCOBACTERIUM SPEC CULT: NORMAL
MYCOBACTERIUM SPEC CULT: NORMAL
NIGHT BLUE STAIN TISS: NORMAL

## 2025-05-14 NOTE — OUTREACH NOTE
AMBULATORY CASE MANAGEMENT NOTE    Names and Relationships of Patient/Support Persons: Contact: Rayne GrissomMlrm-DVL-Bgijo's wife; Relationship: Other -     CCM Interim Update    Called and spoke with patient's daughter in law, Rayne, with patient in the room to discuss CCM program. She gives verbal informed consent for CCM program. She is aware that with the program we will set goals and care plans to target new cancer diagnosis, coordination of care. She is aware that there may be a copay with the program and that she can withdrawal from the program at any time.    Patient lives alone but family lives near. She has 2 sons and a daughter in law who are very active in her care and will continue to be.    See Full Assessment Below:  Adult Patient Profile  Questions/Answers      Flowsheet Row Most Recent Value   Symptoms/Conditions Managed at Home cancer, cardiovascular, gastrointestinal, respiratory   Barriers to Managing Health stress of chronic illness, understanding health advice   Additional Documentation Gastrointestinal Symptoms/Conditions Management (Group), Respiratory Symptoms/Conditions Management (Group)   Cancer Symptoms/Conditions lung   Cancer Self-Management Outcome not well   Cancer Comment Patient was recently diagnosed with lung cancer. She has a PET scan scheduled for 5/15 then a follow up appointment with oncology next week to develop POC with patient.   Cardiovascular Symptoms/Conditions high blood cholesterol, hypertension   Cardiovascular Management Strategies routine screening, medication therapy   Cardiovascular Self-Management Outcome well   Cardiovascular Comment BP appears to be well controlled mostly. Most recent BPs charted are: 128/80, 136/97, 98/68, 108/73, 117/72   Gastrointestinal Symptoms/Conditions reflux/heartburn   Gastrointestinal Management Strategies medication therapy   Gastrointestinal Self-Management Outcome unsure   Gastrointestinal Comment did not discuss this visit    Respiratory Symptoms/Conditions cough   Respiratory Management Strategies medication therapy, routine screening   Respiratory Self-Management Outcome not well   Respiratory Comment cough present. taking medication to decrease   Taking Medications Not Prescribed no   Missed Doses of Prescribed Medications During Past Week no   Taken Prescribed Medications at Different Time or Schedule During Past Week no   Taken More or Less Medication Than Prescribed no   Barriers to Taking Medication as Prescribed none   Current Living Arrangements home        SDOH updated and reviewed with the patient during this program:  Financial Resource Strain: Low Risk  (5/14/2025)    Overall Financial Resource Strain (CARDIA)     Difficulty of Paying Living Expenses: Not hard at all      --     Food Insecurity: No Food Insecurity (5/14/2025)    Hunger Vital Sign     Worried About Running Out of Food in the Last Year: Never true     Ran Out of Food in the Last Year: Never true      --     Housing Stability: Unknown (5/14/2025)    Housing Stability Vital Sign     Unable to Pay for Housing in the Last Year: No     Homeless in the Last Year: No      --     Stress: Stress Concern Present (5/14/2025)    [employer] - Occupational Stress Questionnaire     Feeling of Stress : Very much      --     Transportation Needs: No Transportation Needs (5/14/2025)    PRAPARE - Transportation     Lack of Transportation (Medical): No     Lack of Transportation (Non-Medical): No      --     Utilities: Not At Risk (5/14/2025)    [facility] Utilities     Threatened with loss of utilities: No     Arianne established with patient/daughter in law. They were given my direct number to call with further questions or concerns    Care Coordination    I communicated electronically with PCP regarding patient enrollment in CCM.    I scheduled a PCP follow up appointment for patient.    Emily CASTANON  Ambulatory Case Management    5/14/2025, 13:45 EDT

## 2025-05-15 ENCOUNTER — HOSPITAL ENCOUNTER (OUTPATIENT)
Dept: PET IMAGING | Facility: HOSPITAL | Age: 80
Discharge: HOME | End: 2025-05-15
Payer: MEDICARE

## 2025-05-15 DIAGNOSIS — C34.92 PRIMARY MALIGNANT NEOPLASM OF LEFT LUNG METASTATIC TO OTHER SITE: ICD-10-CM

## 2025-05-15 DIAGNOSIS — R59.0 MEDIASTINAL LYMPHADENOPATHY: ICD-10-CM

## 2025-05-15 DIAGNOSIS — C34.92 ADENOCARCINOMA, LUNG, LEFT: ICD-10-CM

## 2025-05-15 DIAGNOSIS — R91.8 OTHER NONSPECIFIC ABNORMAL FINDING OF LUNG FIELD: ICD-10-CM

## 2025-05-15 DIAGNOSIS — J90 PLEURAL EFFUSION: ICD-10-CM

## 2025-05-15 PROCEDURE — 78815 PET IMAGE W/CT SKULL-THIGH: CPT

## 2025-05-15 PROCEDURE — A9552 F18 FDG: HCPCS | Performed by: NURSE PRACTITIONER

## 2025-05-15 PROCEDURE — 34310000005 FLUDEOXYGLUCOSE F18 SOLUTION: Performed by: NURSE PRACTITIONER

## 2025-05-15 RX ADMIN — FLUDEOXYGLUCOSE F 18 1 DOSE: 200 INJECTION, SOLUTION INTRAVENOUS at 13:10

## 2025-05-19 ENCOUNTER — READMISSION MANAGEMENT (OUTPATIENT)
Dept: CALL CENTER | Facility: HOSPITAL | Age: 80
End: 2025-05-19
Payer: MEDICARE

## 2025-05-19 LAB
CYTO UR: NORMAL
LAB AP CASE REPORT: NORMAL
LAB AP CLINICAL INFORMATION: NORMAL
LAB AP DIAGNOSIS COMMENT: NORMAL
LAB AP SPECIAL STAINS: NORMAL
Lab: NORMAL
PATH REPORT.ADDENDUM SPEC: NORMAL
PATH REPORT.FINAL DX SPEC: NORMAL
PATH REPORT.GROSS SPEC: NORMAL

## 2025-05-19 NOTE — OUTREACH NOTE
COPD/PN Week 2 Survey      Flowsheet Row Responses   [facility] patient discharged from? [facility]   Does the patient have one of the following disease processes/diagnoses(primary or secondary)? Pneumonia   Week 2 attempt successful? Yes   Call start time 1326   Call end time 1332   Is patient permission given to speak with other caregiver? Yes   Person spoke with today (if not patient) and relationship Ronen-son.   Meds reviewed with patient/caregiver? Yes   Is the patient having any side effects they believe may be caused by any medication additions or changes? No   Does the patient have all medications ordered at discharge? Yes   Is the patient taking all medications as directed (includes completed medication regime)? Yes   Comments regarding appointments Oncologist appt tomorrow.   Does the patient have a primary care provider?  Yes   Does the patient have an appointment with their PCP or specialist within 7 days of discharge? Yes   Has the patient kept scheduled appointments due by today? Yes   Has home health visited the patient within 72 hours of discharge? N/A   Pulse Ox monitoring None   Psychosocial issues? No   Did the patient receive a copy of their discharge instructions? Yes   Nursing interventions Reviewed instructions with patient   What is the patient's perception of their health status since discharge? Same   Nursing Interventions Nurse provided patient education   If the patient is a current smoker, are they able to teach back resources for cessation? Not a smoker   Is the patient/caregiver able to teach back the hierarchy of who to call/visit for symptoms/problems? PCP, Specialist, Home health nurse, Urgent Care, ED, 911 Yes   Is the patient/caregiver able to teach back signs and symptoms of worsening condition: Fever/chills, Shortness of breath, Chest pain   Is the patient/caregiver able to teach back importance of completing antibiotic course of treatment? --  [Was not discharged with any  antibiotics.]   Week 2 call completed? Yes   Is the patient interested in additional calls from an ambulatory ? No   Would this patient benefit from a Referral to Amb Social Work? No   Wrap up additional comments Patient states is about the same. Denies any worsening SOA, chest pain or fever/chills. States cough continues. States anxious to learn plan of care at oncology appt tomorrow. Denies any needs today.   Call end time 1332            Raegan DOYLE - Registered Nurse

## 2025-05-20 ENCOUNTER — HOSPITAL ENCOUNTER (OUTPATIENT)
Dept: RADIATION ONCOLOGY | Facility: HOSPITAL | Age: 80
Setting detail: RADIATION/ONCOLOGY SERIES
End: 2025-05-20
Payer: MEDICARE

## 2025-05-20 ENCOUNTER — CONSULT (OUTPATIENT)
Dept: RADIATION ONCOLOGY | Facility: HOSPITAL | Age: 80
End: 2025-05-20
Payer: MEDICARE

## 2025-05-20 VITALS
WEIGHT: 136.02 LBS | BODY MASS INDEX: 25.72 KG/M2 | TEMPERATURE: 97.7 F | OXYGEN SATURATION: 92 % | HEART RATE: 115 BPM | DIASTOLIC BLOOD PRESSURE: 76 MMHG | SYSTOLIC BLOOD PRESSURE: 88 MMHG | RESPIRATION RATE: 28 BRPM

## 2025-05-20 DIAGNOSIS — C34.92 PRIMARY MALIGNANT NEOPLASM OF LEFT LUNG METASTATIC TO OTHER SITE: ICD-10-CM

## 2025-05-20 DIAGNOSIS — J90 PLEURAL EFFUSION: ICD-10-CM

## 2025-05-20 DIAGNOSIS — R59.0 MEDIASTINAL LYMPHADENOPATHY: ICD-10-CM

## 2025-05-20 DIAGNOSIS — C34.12 PRIMARY ADENOCARCINOMA OF UPPER LOBE OF LEFT LUNG: Primary | ICD-10-CM

## 2025-05-20 DIAGNOSIS — C34.92 ADENOCARCINOMA, LUNG, LEFT: ICD-10-CM

## 2025-05-20 PROCEDURE — G0463 HOSPITAL OUTPT CLINIC VISIT: HCPCS | Performed by: RADIOLOGY

## 2025-05-20 RX ORDER — BENZONATATE 100 MG/1
100 CAPSULE ORAL 3 TIMES DAILY PRN
Qty: 50 CAPSULE | Refills: 1 | Status: SHIPPED | OUTPATIENT
Start: 2025-05-20

## 2025-05-20 RX ORDER — MIRTAZAPINE 7.5 MG/1
7.5 TABLET, FILM COATED ORAL NIGHTLY
Qty: 30 TABLET | Refills: 0 | Status: SHIPPED | OUTPATIENT
Start: 2025-05-20

## 2025-05-20 NOTE — PROGRESS NOTES
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  Ms. Grissom has felt progressively ill over the past couple of months.  She presented to [facility] on 5/6/2025 with complaints of cough and shortness of breath.  CT scan of the chest on 5/6/2025 revealed a masslike consolidation in the left upper lobe and lingula with septal thickening and nodules in the left lower lobe.  There was a moderate size left pleural effusion.  Additionally, there is enhancing mass in the right posterior hepatic lobe measuring 11 mm and small sclerotic foci in the T10 and T11 vertebral bodies.  MRI of the brain with and without contrast on 5/13/2025 revealed numerous supratentorial and infratentorial enhancing lesions including a lesion within the left centrum semiovale measuring 13 x 12 mm with surrounding vasogenic edema.  PET/CT on 5/15/2025 revealed a large area of hypermetabolic consolidation in the left upper lobe with hypermetabolic mediastinal and left hilar adenopathy.  There was a 7 mm hypermetabolic left adrenal nodule as well as an 8 mm hypermetabolic nodule at the left bulmaro of the diaphragm and hypermetabolic mesenteric adenopathy suspicious for metastatic disease.  A lesion at the posterior aspect of the right fifth rib without a discrete CT correlate was visualized.  Ms. Grissom reports some imbalance and right lower extremity weakness.  She denies pain of any kind.  She denies headaches, vision changes, word finding difficulty, confusion or seizures.  She is currently taking prednisone on a taper.   She has markedly poor appetite and decreased nutritional intake.  She does report some difficulty with sleeping due to a persistent cough; she denies hemoptysis    Subjective        Review of Systems: Review of Systems   Constitutional:  Positive for appetite change (Decreased), diaphoresis (Noted recently), fatigue (5-6/10) and unexpected weight change (Down 14lbs this month.). Negative for chills and fever.   HENT:  Positive for trouble swallowing (States that she puts food in her mouth and is not able to swallow it, noted since the beginning of May.). Negative for hearing loss and sore throat.    Eyes:  Negative for photophobia and visual disturbance.   Respiratory:  Positive for cough (Frequent, productive with clear secretions, noted x2 months.), shortness of breath (Noted with coughing spells, x2 months.) and wheezing (Occasional). Negative for chest tightness.    Cardiovascular:  Negative for chest pain and palpitations.   Gastrointestinal:  Positive for constipation (Ongoing, resolves on own, last bm a couple of days ago.). Negative for abdominal distention, abdominal pain, anal bleeding, blood in stool, diarrhea, nausea and vomiting.   Genitourinary:  Negative for difficulty urinating, dysuria, frequency, hematuria and urgency.        Leakage noted at night and some throughout the day.     Musculoskeletal:  Positive for arthralgias (Right hand and right knee, ongoing) and gait problem (Unsteady gait and shuffling feet x2 weeks.). Negative for back pain.   Skin:  Negative for color change and rash.   Neurological:  Positive for weakness (Noted on right side, at least x2 weeks.). Negative for dizziness, tremors, seizures, syncope, speech difficulty, light-headedness, numbness and headaches.        Forgetfulness- worsened recently.   Psychiatric/Behavioral:  Positive for confusion (Sometimes will forget the date/day.), decreased concentration (Trouble focusing, x2 weeks.) and sleep disturbance (Ongoing but  worsened.). Negative for self-injury and suicidal ideas.        Past Medical History:   Past Medical History:   Diagnosis Date    Arthritis     Depression     Endometriosis     GERD (gastroesophageal reflux disease) 02/12/2015    Hyperlipidemia     Hypertension     Hypothyroidism     Lung cancer     Osteoporosis     Vitamin D deficiency        Past Surgical History:   Past Surgical History:   Procedure Laterality Date    BRONCHOSCOPY N/A 05/07/2025    Procedure: BRONCHOSCOPY WITH BRONCHOALVEOLAR LAVAGE, POSSIBLE BIOPSY, BRUSHING, WASHING, AIRWAY INSPECTION: insertion of lighted instrument to view inside the lung;  Surgeon: Raleigh Tompkins MD;  Location: [facility] MAIN OR;  Service: Pulmonary;  Laterality: N/A;    ROTATOR CUFF REPAIR Right        Family History:   Family History   Problem Relation Age of Onset    Cancer Father     Asthma Father        Social History:   Social History     Socioeconomic History    Marital status:    Tobacco Use    Smoking status: Never    Smokeless tobacco: Never   Vaping Use    Vaping status: Never Used   Substance and Sexual Activity    Alcohol use: Yes     Comment: rarely    Drug use: Never    Sexual activity: Defer       Medications:     Current Outpatient Medications:     albuterol sulfate  (90 Base) MCG/ACT inhaler, Inhale 2 puffs Every 4 (Four) Hours As Needed for Wheezing. (Patient taking differently: Inhale 2 puffs As Needed for Wheezing.), Disp: 18 g, Rfl: 0    amitriptyline (ELAVIL) 10 MG tablet, Take 1 tablet by mouth Every Night., Disp: 90 tablet, Rfl: 1    atorvastatin (LIPITOR) 20 MG tablet, Take 1 tablet by mouth Every Night., Disp: 90 tablet, Rfl: 1    Diclofenac Sodium (VOLTAREN) 1 % gel gel, Apply  topically to the appropriate area as directed 4 (Four) Times a Day., Disp: 150 g, Rfl: 3    hydroCHLOROthiazide 25 MG tablet, Take 1 tablet by mouth Daily., Disp: 90 tablet, Rfl: 1    Hydrocod Lloyd-Chlorphe Lloyd ER (TUSSIONEX PENNKINETIC) 10-8 MG/5ML ER  suspension, Take 2.5 mL by mouth Every 12 (Twelve) Hours As Needed for Cough for up to 6 days., Disp: 30 mL, Rfl: 0    levothyroxine (Synthroid) 50 MCG tablet, Take 1 tablet by mouth Daily., Disp: 90 tablet, Rfl: 1    lisinopril (PRINIVIL,ZESTRIL) 10 MG tablet, Take 1 tablet by mouth Daily., Disp: 90 tablet, Rfl: 1    pantoprazole (Protonix) 40 MG EC tablet, Take 1 tablet by mouth Daily., Disp: 90 tablet, Rfl: 1    predniSONE (DELTASONE) 20 MG tablet, Take 2 tablets by mouth Daily With Breakfast for 5 days, THEN 1.5 tablets Daily With Breakfast for 7 days, THEN 1 tablet Daily With Breakfast for 7 days, THEN 0.5 tablets Daily With Breakfast for 7 days., Disp: 31 tablet, Rfl: 0    traZODone (DESYREL) 50 MG tablet, Take 1 tablet by mouth Every Night., Disp: 90 tablet, Rfl: 1    benzonatate (Tessalon Perles) 100 MG capsule, Take 1 capsule by mouth 3 (Three) Times a Day As Needed for Cough., Disp: 50 capsule, Rfl: 1    mirtazapine (REMERON) 7.5 MG tablet, Take 1 tablet by mouth Every Night., Disp: 30 tablet, Rfl: 0    Allergies:   Allergies   Allergen Reactions    Methotrexate Other (See Comments)     Pt caused hair loss        Pain: (on a scale of 0-10)   Pain Score    05/20/25 0929   PainSc: 0-No pain       Nancie Grissom reports a pain score of 0.  Given her pain assessment as noted, treatment options were discussed and the following options were decided upon as a follow-up plan to address the patient's pain: continuation of current treatment plan for pain.     Advanced Care Plan: N   Quality of Life: 70 - Difficulty Walking, Needs Assistance     Objective     Physical Exam:   Vital Signs:   Vitals:    05/20/25 0929 05/20/25 0945   BP: 95/73 (!) 88/76   Pulse: 102 115   Resp: 28    Temp: 97.7 °F (36.5 °C)    TempSrc: Oral    SpO2: 92% 92%   Weight: 61.7 kg (136 lb 0.4 oz)    PainSc: 0-No pain      Body mass index is 25.72 kg/m².     Physical Exam  Constitutional:       General: She is not in acute distress.      Appearance: She is not ill-appearing, toxic-appearing or diaphoretic.   Pulmonary:      Effort: Pulmonary effort is normal. No respiratory distress.   Skin:     General: Skin is warm and dry.      Coloration: Skin is not jaundiced.      Findings: No lesion.   Neurological:      General: No focal deficit present.      Mental Status: She is alert and oriented to person, place, and time.      Cranial Nerves: No cranial nerve deficit.      Gait: Gait abnormal.   Psychiatric:         Mood and Affect: Mood normal.         Judgment: Judgment normal.         Results:   Radiographs: NM PET/CT Skull Base to Mid Thigh  Result Date: 5/15/2025  Impression: 1. Large area of hypermetabolic consolidation in the left upper lobe and lingula consistent with the patient's history of adenocarcinoma. Hypermetabolic mediastinal and left hilar adenopathy. 2. Small left pleural effusion. 3. 7 mm hypermetabolic left adrenal nodule consistent with a metastatic lesion. 8 mm hypermetabolic lymph node adjacent to the left bulmaro of the diaphragm just below the level of the left renal vein consistent with a metastatic lesion. Hypermetabolic mesenteric adenopathy suspicious for metastatic disease. 4. Hypermetabolic activity along the posterior aspect of the right fifth rib and along the inferior endplate of L4 without discrete lesions on the CT images. Muscular/degenerative activity is favored over metastatic disease. Electronically Signed: Marcel Desai MD  5/15/2025 2:50 PM EDT  Workstation ID: [id number]    MRI Brain With & Without Contrast  Result Date: 5/13/2025  Impression: 1.Numerous supratentorial and infratentorial enhancing lesions, compatible with metastasis. Some have surrounding vasogenic edema, but with no acute herniation. 2.No acute intracranial process identified. 3.Findings suggestive of mild chronic small vessel ischemic disease. Electronically Signed: Jorge Carver MD  5/13/2025 3:57 PM EDT  Workstation ID: [id number]    XR Chest  1 View  Result Date: 5/7/2025  Impression: 1.No pneumothorax following left thoracentesis. 2.Increase in left lung infiltrate. 3.Large hiatal hernia. Electronically Signed: Hamlet Barker MD  5/7/2025 12:58 PM EDT  Workstation ID: [id number]    CT Chest With Contrast Diagnostic  Result Date: 5/6/2025  1.There is masslike consolidation in the left upper lobe and lingula with septal thickening and centrilobular nodules in the left lower lobe. There is a moderate size left pleural effusion. Findings are suspicious for pneumonia. Underlying malignancy cannot be excluded. Recommend bronchoscopy directed towards the left upper lobe or lingula for evaluation. 2.There is a mass in the upper outer left breast measuring up to 1.5 cm. Diagnostic mammogram and ultrasound is recommended. 3.There is an enhancing mass in the right posterior hepatic lobe measuring 11 mm. This could be seen with a flash filling hemangioma. Metastatic disease cannot be excluded. 4.There are small sclerotic foci in the T10 and T11 vertebral bodies. These could be seen with bone islands. Metastatic disease cannot be excluded. 5.Large hernia with stomach above the diaphragm. 6.Mildly enlarged mediastinal lymph nodes could be reactive or metastatic. 7.PET/CT may be helpful for better evaluation. Electronically Signed: Alexus Dillard MD  5/6/2025 8:46 PM EDT  Workstation ID: [id number]    XR Chest 1 View  Result Date: 5/6/2025  Impression: Diffuse airspace opacity in the left lung. This is slightly progressed from previous exam. CT chest is recommended. Persistent pneumonia versus neoplastic process. Electronically Signed: Alexus Dillard MD  5/6/2025 4:54 PM EDT  Workstation ID: [id number]    XR Chest PA & Lateral  Result Date: 4/25/2025  Impression: Left-sided pneumonia with small left pleural effusion. Electronically Signed: Diana Morfin MD  4/25/2025 4:02 PM EDT  Workstation ID: [id number]  I personally reviewed the PET/CT performed on 5/15/2025 as well as the MRI  brain with and without contrast performed on 5/13/2025.  The pertinent findings are as above in HPI.      Pathology: I personally reviewed the pathology report from the procedure performed on 5/7/2025.  The pertinent findings are as above in HPI.      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  She has multiple intracranial contrast-enhancing lesions consistent with supratentorial and infratentorial brain metastases.  ECOG 2    I discussed the clinical, radiographic and pathologic findings to date with Ms. Grissom.  I explained the role of radiotherapy in the management of brain metastases, outlining the rationale for both whole brain radiotherapy and stereotactic radiosurgery.  I recommended fractionated stereotactic radiosurgery to the radiographically apparent intracranial contrast-enhancing lesions, outlining the risks, potential benefits and alternatives of this approach.  Ms. Grissom is agreeable to my recommendation and will undergo CT simulation with MRI fusion for treatment planning purposes.  She will undergo evaluation by Dr. Rios next week regarding systemic therapy.            Dl Segundo MD  Radiation Oncology  [facility]    This document has been signed by Dl Segundo MD on May 20, 2025 11:46 EDT

## 2025-05-20 NOTE — PROGRESS NOTES
Enter Query Response Below      Query Response: yes             If applicable, please update the problem list.

## 2025-05-22 ENCOUNTER — HOSPITAL ENCOUNTER (OUTPATIENT)
Dept: MRI IMAGING | Facility: HOSPITAL | Age: 80
Discharge: HOME | End: 2025-05-22
Payer: MEDICARE

## 2025-05-22 ENCOUNTER — HOSPITAL ENCOUNTER (OUTPATIENT)
Dept: RADIATION ONCOLOGY | Facility: HOSPITAL | Age: 80
Discharge: HOME | End: 2025-05-22

## 2025-05-22 DIAGNOSIS — C34.92 PRIMARY MALIGNANT NEOPLASM OF LEFT LUNG METASTATIC TO OTHER SITE: ICD-10-CM

## 2025-05-22 DIAGNOSIS — C79.31 SECONDARY MALIGNANT NEOPLASM OF BRAIN: ICD-10-CM

## 2025-05-22 PROCEDURE — 77263 THER RADIOLOGY TX PLNG CPLX: CPT | Performed by: RADIOLOGY

## 2025-05-22 PROCEDURE — 77334 RADIATION TREATMENT AID(S): CPT | Performed by: RADIOLOGY

## 2025-05-27 ENCOUNTER — HOSPITAL ENCOUNTER (OUTPATIENT)
Dept: RADIATION ONCOLOGY | Facility: HOSPITAL | Age: 80
Discharge: HOME | End: 2025-05-27

## 2025-05-27 ENCOUNTER — OFFICE VISIT (OUTPATIENT)
Dept: PULMONOLOGY | Facility: CLINIC | Age: 80
End: 2025-05-27
Payer: MEDICARE

## 2025-05-27 VITALS
WEIGHT: 130.6 LBS | DIASTOLIC BLOOD PRESSURE: 64 MMHG | TEMPERATURE: 98.3 F | RESPIRATION RATE: 16 BRPM | SYSTOLIC BLOOD PRESSURE: 88 MMHG | HEIGHT: 61 IN | HEART RATE: 105 BPM | OXYGEN SATURATION: 94 % | BODY MASS INDEX: 24.66 KG/M2

## 2025-05-27 DIAGNOSIS — J90 RECURRENT PLEURAL EFFUSION ON LEFT: Primary | ICD-10-CM

## 2025-05-27 PROCEDURE — 77300 RADIATION THERAPY DOSE PLAN: CPT | Performed by: RADIOLOGY

## 2025-05-27 PROCEDURE — 77338 DESIGN MLC DEVICE FOR IMRT: CPT | Performed by: RADIOLOGY

## 2025-05-27 PROCEDURE — 77301 RADIOTHERAPY DOSE PLAN IMRT: CPT | Performed by: RADIOLOGY

## 2025-05-27 PROCEDURE — 3074F SYST BP LT 130 MM HG: CPT | Performed by: INTERNAL MEDICINE

## 2025-05-27 PROCEDURE — 99214 OFFICE O/P EST MOD 30 MIN: CPT | Performed by: INTERNAL MEDICINE

## 2025-05-27 PROCEDURE — 3078F DIAST BP <80 MM HG: CPT | Performed by: INTERNAL MEDICINE

## 2025-05-27 RX ORDER — IPRATROPIUM BROMIDE AND ALBUTEROL SULFATE 2.5; .5 MG/3ML; MG/3ML
3 SOLUTION RESPIRATORY (INHALATION) 4 TIMES DAILY PRN
Qty: 360 ML | Refills: 1 | Status: SHIPPED | OUTPATIENT
Start: 2025-05-27

## 2025-05-27 RX ORDER — MIRTAZAPINE 15 MG/1
15 TABLET, FILM COATED ORAL NIGHTLY
COMMUNITY
Start: 2025-05-20

## 2025-05-27 NOTE — PROGRESS NOTES
Primary Care Provider  Wen Leblanc MD     Referring Provider  No ref. provider found     Chief Complaint  Follow-up (Hospital follow up/), Results, and Cough    Subjective          Nancie Grissom presents to [facility]  History of Present Illness  Nancie Grissom is a 79 y.o. female patient   History of Present Illness  The patient is an 79-year-old female with a history of left-sided pleural effusion and consolidative opacity in the left upper lobe, found to have adenocarcinoma of the lung with metastatic disease. She is following with oncology and radiation oncology as well.    She reports a severe cough and occasional shortness of breath, particularly during physical exertion such as walking. She has not been using the nebulizer machine provided for her treatment. She has been using an incentive spirometer at home. She has never smoked, but she was exposed to secondhand smoke from her father, brothers, and .    SOCIAL HISTORY  She has never smoked.    Review of Systems     General:  No Fatigue, No Fever No weight loss or loss of appetite  HEENT: No dysphagia, No Visual Changes, no rhinorrhea  Respiratory:  + cough,+Dyspnea, intermittent phlegm, No Pleuritic Pain, no wheezing, no hemoptysis.  Cardiovascular: Denies chest pain, denies palpitations,+CASTRO, No Chest Pressure  Gastrointestinal:  No Abdominal Pain, No Nausea, No Vomiting, No Diarrhea  Genitourinary:  No Dysuria, No Frequency, No Hesitancy  Musculoskeletal: No muscle pain or swelling  Endocrine:  No Heat Intolerance, No Cold Intolerance  Hematologic:  No Bleeding, No Bruising  Psychiatric:  No Anxiety, No Depression  Neurologic:  No Confusion, no Dysarthria, No Headaches  Skin:  No Rash, No Open Wounds    Family History   Problem Relation Age of Onset    Cancer Father     Asthma Father         Social History     Socioeconomic History    Marital status:    Tobacco Use    Smoking status:  Never    Smokeless tobacco: Never   Vaping Use    Vaping status: Never Used   Substance and Sexual Activity    Alcohol use: Yes     Comment: rarely    Drug use: Never    Sexual activity: Defer        Past Medical History:   Diagnosis Date    Arthritis     Depression     Endometriosis     GERD (gastroesophageal reflux disease) 02/12/2015    Hyperlipidemia     Hypertension     Hypothyroidism     Lung cancer     Osteoporosis     Vitamin D deficiency         Immunization History   Administered Date(s) Administered    COVID-19 ([employer]) 04/05/2021    Flu Vaccine Quad PF >36MO 02/09/2018    Fluzone High-Dose 65+YRS 01/02/2025    Fluzone High-Dose 65+yrs 10/26/2022, 10/17/2023    Influenza, Unspecified 10/21/2019    Pneumococcal Conjugate 20-Valent (PCV20) 10/17/2023    Pneumococcal Polysaccharide (PPSV23) 08/12/2014         Allergies   Allergen Reactions    Methotrexate Other (See Comments)     Pt caused hair loss           Current Outpatient Medications:     amitriptyline (ELAVIL) 10 MG tablet, Take 1 tablet by mouth Every Night., Disp: 90 tablet, Rfl: 1    atorvastatin (LIPITOR) 20 MG tablet, Take 1 tablet by mouth Every Night., Disp: 90 tablet, Rfl: 1    benzonatate (Tessalon Perles) 100 MG capsule, Take 1 capsule by mouth 3 (Three) Times a Day As Needed for Cough., Disp: 50 capsule, Rfl: 1    Diclofenac Sodium (VOLTAREN) 1 % gel gel, Apply  topically to the appropriate area as directed 4 (Four) Times a Day., Disp: 150 g, Rfl: 3    hydroCHLOROthiazide 25 MG tablet, Take 1 tablet by mouth Daily., Disp: 90 tablet, Rfl: 1    levothyroxine (Synthroid) 50 MCG tablet, Take 1 tablet by mouth Daily., Disp: 90 tablet, Rfl: 1    lisinopril (PRINIVIL,ZESTRIL) 10 MG tablet, Take 1 tablet by mouth Daily., Disp: 90 tablet, Rfl: 1    mirtazapine (REMERON) 7.5 MG tablet, Take 1 tablet by mouth Every Night., Disp: 30 tablet, Rfl: 0    pantoprazole (Protonix) 40 MG EC tablet, Take 1 tablet by mouth Daily., Disp: 90 tablet, Rfl: 1     "predniSONE (DELTASONE) 20 MG tablet, Take 2 tablets by mouth Daily With Breakfast for 5 days, THEN 1.5 tablets Daily With Breakfast for 7 days, THEN 1 tablet Daily With Breakfast for 7 days, THEN 0.5 tablets Daily With Breakfast for 7 days., Disp: 31 tablet, Rfl: 0    traZODone (DESYREL) 50 MG tablet, Take 1 tablet by mouth Every Night., Disp: 90 tablet, Rfl: 1    albuterol sulfate  (90 Base) MCG/ACT inhaler, Inhale 2 puffs Every 4 (Four) Hours As Needed for Wheezing. (Patient not taking: Reported on 5/27/2025), Disp: 18 g, Rfl: 0    ipratropium-albuterol (DUO-NEB) 0.5-2.5 mg/3 ml nebulizer, Take 3 mL by nebulization 4 (Four) Times a Day As Needed for Wheezing or Shortness of Air., Disp: 360 mL, Rfl: 1    mirtazapine (REMERON) 15 MG tablet, Take 1 tablet by mouth Every Night. (Patient not taking: Reported on 5/27/2025), Disp: , Rfl:      Objective   Physical Exam  Physical Exam      Vital Signs:   BP (!) 88/64 (BP Location: Right arm, Patient Position: Sitting, Cuff Size: Adult)   Pulse 105   Temp 98.3 °F (36.8 °C) (Tympanic)   Resp 16   Ht 154.9 cm (60.98\")   Wt 59.2 kg (130 lb 9.6 oz)   SpO2 94% Comment: room air  BMI 24.69 kg/m²       Vital Signs Reviewed  WDWN, Alert, NAD.   HEENT:  PERRL, EOMI.  OP, nares clear, no sinus tenderness  Mallampatti classification : 1  Neck:  Supple, no JVD, no thyromegaly  Lymph: no axillary, cervical, supraclavicular lymphadenopathy noted bilaterally  Chest:  good aeration, bilateral diminished breath sounds, no wheezing, crackles or rhonchi, resonant to percussion b/l  CV: RRR, no MGR, pulses 2+, equal.  Abd:  Soft, NT, ND, + BS, no HSM  EXT:  no clubbing, no cyanosis, No BLE edema  Neuro:  A&Ox3, CN grossly intact, no focal deficits.  Skin: No rashes or lesions noted     Result Review :   The following data was reviewed by: Raleigh Tompkins MD on 05/27/2025:  Common labs          5/6/2025    16:27 5/7/2025    05:37 5/8/2025    05:34   Common Labs   Glucose 106  91  " 87    BUN 17  16  13    Creatinine 1.08  0.88  0.77    Sodium 137  138  136    Potassium 4.2  3.9  3.8    Chloride 101  103  102    Calcium 8.9  8.4  8.3    Albumin 3.8      Total Bilirubin 0.4      Alkaline Phosphatase 72      AST (SGOT) 21      ALT (SGPT) 11      WBC 11.21  7.99  7.94    Hemoglobin 14.0  12.5  11.7    Hematocrit 41.6  37.5  35.2    Platelets 309  225  217      CMP          5/6/2025    16:27 5/7/2025    05:37 5/8/2025    05:34   CMP   Glucose 106  91  87    BUN 17  16  13    Creatinine 1.08  0.88  0.77    EGFR 51.7  66.1  77.6    Sodium 137  138  136    Potassium 4.2  3.9  3.8    Chloride 101  103  102    Calcium 8.9  8.4  8.3    Total Protein 7.0      Albumin 3.8      Globulin 3.2      Total Bilirubin 0.4      Alkaline Phosphatase 72      AST (SGOT) 21      ALT (SGPT) 11      Albumin/Globulin Ratio 1.2      BUN/Creatinine Ratio 15.7  18.2  16.9    Anion Gap 11.6  11.1  9.5      CBC          5/6/2025    16:27 5/7/2025    05:37 5/8/2025    05:34   CBC   WBC 11.21  7.99  7.94    RBC 4.54  4.12  3.82    Hemoglobin 14.0  12.5  11.7    Hematocrit 41.6  37.5  35.2    MCV 91.6  91.0  92.1    MCH 30.8  30.3  30.6    MCHC 33.7  33.3  33.2    RDW 14.0  13.9  14.1    Platelets 309  225  217      CBC w/diff          5/6/2025    16:27 5/7/2025    05:37 5/8/2025    05:34   CBC w/Diff   WBC 11.21  7.99  7.94    RBC 4.54  4.12  3.82    Hemoglobin 14.0  12.5  11.7    Hematocrit 41.6  37.5  35.2    MCV 91.6  91.0  92.1    MCH 30.8  30.3  30.6    MCHC 33.7  33.3  33.2    RDW 14.0  13.9  14.1    Platelets 309  225  217    Neutrophil Rel % 83.6  75.9  77.4    Immature Granulocyte Rel % 0.4  0.3  0.4    Lymphocyte Rel % 8.5  11.6  11.3    Monocyte Rel % 5.3  8.8  7.6    Eosinophil Rel % 1.7  2.6  2.5    Basophil Rel % 0.5  0.8  0.8      Data reviewed : Radiologic studies Recent imaging reviewed.     Results  Imaging  PET scan shows some fluid around the left lung and active cancer in the lung.             Assessment and  Plan    Diagnoses and all orders for this visit:    1. Recurrent pleural effusion on left (Primary)  -     No Thoracentesis Labs Needed; Standing  -     US Thoracentesis Left; Future  -     Home Nebulizer  -     ipratropium-albuterol (DUO-NEB) 0.5-2.5 mg/3 ml nebulizer; Take 3 mL by nebulization 4 (Four) Times a Day As Needed for Wheezing or Shortness of Air.  Dispense: 360 mL; Refill: 1      Assessment & Plan  1. Adenocarcinoma of the lung with metastatic disease.  She is following with oncology and radiation oncology. The cough and shortness of breath are likely related to her cancer. Recent PET scan from 05/15/2025 shows some fluid accumulation around the left lung and active cancer in the lung. Thoracentesis will be scheduled for Friday after her radiation therapy to help alleviate symptoms. A nebulizer machine will be provided today, and she is advised to perform breathing treatments twice daily, increasing to 4-6 times daily if beneficial. The medication for the nebulizer will be sent to [place] [facility]. She is also advised to continue using the incentive spirometer.    Discussed thoracentesis, risks and benefits of the procedure was discussed in detail. She is agreeable, will plan for procedure later this week.     Check PFT.     Follow Up   Return in about 8 weeks (around 7/22/2025).  Patient was given instructions and counseling regarding her condition or for health maintenance advice. Please see specific information pulled into the AVS if appropriate.     Patient or patient representative verbalized consent for the use of Ambient Listening during the visit with  Raleigh Tompkins MD for chart documentation. 5/27/2025  12:25 EDT    Electronically signed by Raleigh Tompkins MD, 5/27/2025, 12:31 EDT.

## 2025-05-27 NOTE — PROGRESS NOTES
Chief Complaint/Care Team   Metastatic adenocarcinoma, lung primary    Aurora Mukherjee APRN Archemetre, Rose, MD    History of Present Illness     Diagnosis: De Robbie Metastatic Adenocarcinoma, lung primary from Left lung pleural fluid on 5/7/2025  PDL % of 90%, negative for ALK, ROS1, EGFR per integrated oncology results, cT4,cN2,M1c    Current Treatment: Plan for Carboplatin, Pemetrexed, pembrolizumab IV Q3 weeks followed by pembrolizumab monotherapy    Previous Treatment: None    Nancie Grissom is a 79 y.o. female who presents to [facility] HEMATOLOGY [facility] ONCOLOGY for De Robbie Metastatic Adenocarcinoma, lung primary from Left lung pleural fluid on 5/7/2025    History of Present Illness  The patient is here to discuss treatment for lung cancer.    Her symptoms began with a persistent cough, initially diagnosed as pneumonia, which has been ongoing for approximately 2 months. She has never smoked in her life but has been exposed to secondhand smoke from her  and relatives. She reports a loss of appetite and has been experiencing difficulty walking due to a loss of sensation in her leg and right hand, which she describes as feeling foreign. She is able to feed herself but struggles with tasks such as holding a fork properly. She is able to use the restroom independently but requires assistance with diaper changes. She reports no numbness or tingling in her hands or feet.    She has no history of lupus but has a family history of rheumatoid arthritis. She is currently on medication for thyroid disease. She has a history of shoulder issues, which were managed with a cast at the age of 10.    SOCIAL HISTORY  She has never smoked in her life.    FAMILY HISTORY  Her mother, father, and sister have a history of arthritis.       Review of Systems   Constitutional:  Positive for appetite change, fatigue and unexpected weight loss.   Respiratory:  Positive for cough.         Oncology/Hematology History  "  Adenocarcinoma of lung   5/28/2025 Initial Diagnosis    Adenocarcinoma of lung     5/28/2025 -  Chemotherapy    OP LUNG Pembrolizumab 200 mg / Pemetrexed / Carboplatin AUC=5         Objective     Vitals:    05/28/25 1408   BP: (!) 82/67   Pulse: 92   Resp: 16   Temp: 97.7 °F (36.5 °C)   TempSrc: Temporal   SpO2: 92%   Weight: 60.1 kg (132 lb 6.4 oz)   Height: 154.9 cm (60.98\")   PainSc: 0-No pain             PHQ-9 Total Score:         Physical Exam  Vitals reviewed. Exam conducted with a chaperone present.   Constitutional:       General: She is not in acute distress.     Appearance: Normal appearance.   HENT:      Head: Normocephalic and atraumatic.   Eyes:      Extraocular Movements: Extraocular movements intact.      Conjunctiva/sclera: Conjunctivae normal.   Cardiovascular:      Pulses: Normal pulses.      Heart sounds: Normal heart sounds.   Pulmonary:      Effort: Pulmonary effort is normal. No respiratory distress.   Abdominal:      General: Bowel sounds are normal. There is no distension.      Palpations: Abdomen is soft. There is no mass.      Tenderness: There is no abdominal tenderness.   Musculoskeletal:      Cervical back: Normal range of motion and neck supple.   Skin:     General: Skin is warm and dry.      Findings: No bruising.   Neurological:      Mental Status: She is oriented to person, place, and time.         Physical Exam  Neurological: Decreased sensation and strength in the right hand and leg, likely due to brain metastases. Difficulty with fine motor skills in the right hand.      Past Medical History     Past Medical History:   Diagnosis Date    Arthritis     Depression     Endometriosis     GERD (gastroesophageal reflux disease) 02/12/2015    Hyperlipidemia     Hypertension     Hypothyroidism     Lung cancer     Osteoporosis     Vitamin D deficiency      Current Outpatient Medications on File Prior to Visit   Medication Sig Dispense Refill    amitriptyline (ELAVIL) 10 MG tablet Take 1 " tablet by mouth Every Night. 90 tablet 1    atorvastatin (LIPITOR) 20 MG tablet Take 1 tablet by mouth Every Night. 90 tablet 1    benzonatate (Tessalon Perles) 100 MG capsule Take 1 capsule by mouth 3 (Three) Times a Day As Needed for Cough. 50 capsule 1    Diclofenac Sodium (VOLTAREN) 1 % gel gel Apply  topically to the appropriate area as directed 4 (Four) Times a Day. 150 g 3    hydroCHLOROthiazide 25 MG tablet Take 1 tablet by mouth Daily. 90 tablet 1    ipratropium-albuterol (DUO-NEB) 0.5-2.5 mg/3 ml nebulizer Take 3 mL by nebulization 4 (Four) Times a Day As Needed for Wheezing or Shortness of Air. 360 mL 1    levothyroxine (Synthroid) 50 MCG tablet Take 1 tablet by mouth Daily. 90 tablet 1    lisinopril (PRINIVIL,ZESTRIL) 10 MG tablet Take 1 tablet by mouth Daily. 90 tablet 1    mirtazapine (REMERON) 7.5 MG tablet Take 1 tablet by mouth Every Night. 30 tablet 0    pantoprazole (Protonix) 40 MG EC tablet Take 1 tablet by mouth Daily. 90 tablet 1    predniSONE (DELTASONE) 20 MG tablet Take 2 tablets by mouth Daily With Breakfast for 5 days, THEN 1.5 tablets Daily With Breakfast for 7 days, THEN 1 tablet Daily With Breakfast for 7 days, THEN 0.5 tablets Daily With Breakfast for 7 days. 31 tablet 0    traZODone (DESYREL) 50 MG tablet Take 1 tablet by mouth Every Night. 90 tablet 1    albuterol sulfate  (90 Base) MCG/ACT inhaler Inhale 2 puffs Every 4 (Four) Hours As Needed for Wheezing. (Patient not taking: Reported on 5/28/2025) 18 g 0    mirtazapine (REMERON) 15 MG tablet Take 1 tablet by mouth Every Night. (Patient not taking: Reported on 5/28/2025)       No current facility-administered medications on file prior to visit.      Allergies   Allergen Reactions    Methotrexate Other (See Comments)     Pt caused hair loss      Past Surgical History:   Procedure Laterality Date    BRONCHOSCOPY N/A 05/07/2025    Procedure: BRONCHOSCOPY WITH BRONCHOALVEOLAR LAVAGE, POSSIBLE BIOPSY, BRUSHING, WASHING, AIRWAY  INSPECTION: insertion of lighted instrument to view inside the lung;  Surgeon: Raleigh Tompkins MD;  Location: [facility] MAIN OR;  Service: Pulmonary;  Laterality: N/A;    ROTATOR CUFF REPAIR Right      Social History     Socioeconomic History    Marital status:    Tobacco Use    Smoking status: Never    Smokeless tobacco: Never   Vaping Use    Vaping status: Never Used   Substance and Sexual Activity    Alcohol use: Yes     Comment: rarely    Drug use: Never    Sexual activity: Defer     Family History   Problem Relation Age of Onset    Cancer Father     Asthma Father        Results     Result Review   The following data was reviewed by: Cristi Rios MD on 05/28/2025:  Lab Results   Component Value Date    HGB 14.7 05/28/2025    HCT 43.4 05/28/2025    MCV 88.4 05/28/2025     05/28/2025    WBC 17.15 (H) 05/28/2025    NEUTROABS 16.41 (H) 05/28/2025    LYMPHSABS 0.48 (L) 05/28/2025    MONOSABS 0.15 05/28/2025    EOSABS 0.00 05/28/2025    BASOSABS 0.01 05/28/2025     Lab Results   Component Value Date    GLUCOSE 152 (H) 05/28/2025    BUN 48.3 (H) 05/28/2025    CREATININE 1.12 (H) 05/28/2025     (L) 05/28/2025    K 3.9 05/28/2025    CL 98 05/28/2025    CO2 19.6 (L) 05/28/2025    CALCIUM 9.3 05/28/2025    PROTEINTOT 6.5 05/28/2025    ALBUMIN 3.5 05/28/2025    BILITOT 0.5 05/28/2025    ALKPHOS 68 05/28/2025    AST 20 05/28/2025    ALT 12 05/28/2025     Lab Results   Component Value Date    MG 2.2 05/06/2025    FREET4 1.40 05/28/2025    TSH 0.819 05/28/2025       No radiology results for the last day  NM PET/CT Skull Base to Mid Thigh  Result Date: 5/15/2025  Impression: Impression: 1. Large area of hypermetabolic consolidation in the left upper lobe and lingula consistent with the patient's history of adenocarcinoma. Hypermetabolic mediastinal and left hilar adenopathy. 2. Small left pleural effusion. 3. 7 mm hypermetabolic left adrenal nodule consistent with a metastatic lesion. 8 mm hypermetabolic lymph  node adjacent to the left bulmaro of the diaphragm just below the level of the left renal vein consistent with a metastatic lesion. Hypermetabolic mesenteric adenopathy suspicious for metastatic disease. 4. Hypermetabolic activity along the posterior aspect of the right fifth rib and along the inferior endplate of L4 without discrete lesions on the CT images. Muscular/degenerative activity is favored over metastatic disease. Electronically Signed: Marcel Desai MD  5/15/2025 2:50 PM EDT  Workstation ID: [id number]    MRI Brain With & Without Contrast  Result Date: 5/13/2025  Impression: Impression: 1.Numerous supratentorial and infratentorial enhancing lesions, compatible with metastasis. Some have surrounding vasogenic edema, but with no acute herniation. 2.No acute intracranial process identified. 3.Findings suggestive of mild chronic small vessel ischemic disease. Electronically Signed: Jorge Carver MD  5/13/2025 3:57 PM EDT  Workstation ID: [id number]    XR Chest 1 View  Result Date: 5/7/2025  Impression: Impression: 1.No pneumothorax following left thoracentesis. 2.Increase in left lung infiltrate. 3.Large hiatal hernia. Electronically Signed: Hamlet Barker MD  5/7/2025 12:58 PM EDT  Workstation ID: [id number]    CT Chest With Contrast Diagnostic  Result Date: 5/6/2025  Impression: 1.There is masslike consolidation in the left upper lobe and lingula with septal thickening and centrilobular nodules in the left lower lobe. There is a moderate size left pleural effusion. Findings are suspicious for pneumonia. Underlying malignancy cannot be excluded. Recommend bronchoscopy directed towards the left upper lobe or lingula for evaluation. 2.There is a mass in the upper outer left breast measuring up to 1.5 cm. Diagnostic mammogram and ultrasound is recommended. 3.There is an enhancing mass in the right posterior hepatic lobe measuring 11 mm. This could be seen with a flash filling hemangioma. Metastatic disease cannot be  excluded. 4.There are small sclerotic foci in the T10 and T11 vertebral bodies. These could be seen with bone islands. Metastatic disease cannot be excluded. 5.Large hernia with stomach above the diaphragm. 6.Mildly enlarged mediastinal lymph nodes could be reactive or metastatic. 7.PET/CT may be helpful for better evaluation. Electronically Signed: Alexus Dillard MD  5/6/2025 8:46 PM EDT  Workstation ID: [id number]    XR Chest 1 View  Result Date: 5/6/2025  Impression: Impression: Diffuse airspace opacity in the left lung. This is slightly progressed from previous exam. CT chest is recommended. Persistent pneumonia versus neoplastic process. Electronically Signed: Alexus Dillard MD  5/6/2025 4:54 PM EDT  Workstation ID: [id number]      Assessment & Plan     Diagnoses and all orders for this visit:    1. Adenocarcinoma of lung, unspecified laterality (Primary)  -     Ambulatory Referral to General Surgery  -     CBC & Differential; Future  -     Comprehensive Metabolic Panel; Future  -     TSH+Free T4; Future  -     lidocaine-prilocaine (EMLA) 2.5-2.5 % cream; Apply 1 Application topically to the appropriate area as directed Daily. Put on port site prior to chemotherapy appointments.  Dispense: 30 g; Refill: 1    2. High risk medication use  -     TSH+Free T4; Future    3. Metastasis to brain         Nancie Grissom is a 79 y.o. female who presents to [facility] for De Robbie Metastatic Adenocarcinoma, lung primary from Left lung pleural fluid on 5/7/2025.    Assessment & Plan  1. Stage IV non-small cell lung cancer.  -The diagnosis is based on a biopsy of the left upper lobe mass showing adenocarcinoma, imaging revealing a large collection of fluid on the left side positive for adenocarcinoma, and a PET scan indicating widespread metastasis including the brain, fluid around the lung, adrenal gland, right fifth rib, and L4 vertebra. The patient has a high PD-L1 score (90%), making her a  candidate for immunotherapy.   -Discussed treatment with chemotherapy with carboplatin and pemetrexed, along with Keytruda every 3 weeks for 4 cycles. Afterward, she will continue with Keytruda alone every 3 weeks. A port will be placed for chemotherapy administration, and she will receive a prescription for a topical anesthetic cream to apply over the port site before each treatment. Labs including CBC, CMP, and thyroid function will be obtained today. An urgent referral to a surgeon for port placement has been made. She is advised to wear a mask, avoid large crowds, and follow COVID-19 precautions due to her immunocompromised status. If she develops a fever, she should seek immediate medical attention at the ER or contact us for further instructions. She will receive medications to manage potential side effects of chemotherapy, including nausea and vomiting. She will also receive an hour of chemo education.  -also ordered TEMPUS NGS testing    Follow-up  The patient will follow up on cycle 1, day 1 of chemotherapy with labs, will discuss bisphosphonate therapy at next clinic appointment    Please note that portions of this note were completed with a voice recognition program.    Electronically signed by Cristi Rios MD, 05/28/25, 5:03 PM EDT.      Follow Up     I spent 60 minutes caring for Nancie on this date of service. This time includes time spent by me in the following activities:preparing for the visit, reviewing tests, obtaining and/or reviewing a separately obtained history, performing a medically appropriate examination and/or evaluation , counseling and educating the patient/family/caregiver, ordering medications, tests, or procedures, referring and communicating with other health care professionals , documenting information in the medical record, independently interpreting results and communicating that information with the patient/family/caregiver, and care coordination    Any chemotherapy or  immunotherapy or other systemic therapy treatment plan involves a high risk of complications and/or mortality of patient management.    The patient was seen and examined. Work by the provider also included review and/or ordering of lab tests, review and/or ordering of radiology tests, review and/or ordering of medicine tests, discussion with other physicians or providers, independent review of data, obtaining old records, review/summation of old records, and/or other review.    I have reviewed the family history, social history, and past medical history for this patient. Previous information and data has been reviewed and updated as needed. I have reviewed and verified the chief complaint, history, and other documentation. The patient was interviewed and examined in the clinic and the chart reviewed. The previous observations, recommendations, and conclusions were reviewed including those of other providers.     The plan was discussed with the patient and/or family. The patient was given time to ask questions and these questions were answered. At the conclusion of their visit they had no additional questions or concerns and all questions were answered to their satisfaction.    Patient was given instructions and counseling regarding her condition or for health maintenance advice. Please see specific information pulled into the AVS if appropriate.       Patient or patient representative verbalized consent for the use of Ambient Listening during the visit with  Cristi Rios MD for chart documentation. 5/28/2025  17:03 EDT

## 2025-05-28 ENCOUNTER — HOSPITAL ENCOUNTER (OUTPATIENT)
Dept: RADIATION ONCOLOGY | Facility: HOSPITAL | Age: 80
Discharge: HOME | End: 2025-05-28

## 2025-05-28 ENCOUNTER — DOCUMENTATION (OUTPATIENT)
Dept: RADIATION ONCOLOGY | Facility: HOSPITAL | Age: 80
End: 2025-05-28
Payer: MEDICARE

## 2025-05-28 ENCOUNTER — CONSULT (OUTPATIENT)
Dept: ONCOLOGY | Facility: HOSPITAL | Age: 80
End: 2025-05-28
Payer: MEDICARE

## 2025-05-28 ENCOUNTER — EDUCATION (OUTPATIENT)
Dept: RADIATION ONCOLOGY | Facility: HOSPITAL | Age: 80
End: 2025-05-28
Payer: MEDICARE

## 2025-05-28 ENCOUNTER — LAB (OUTPATIENT)
Dept: ONCOLOGY | Facility: HOSPITAL | Age: 80
End: 2025-05-28
Payer: MEDICARE

## 2025-05-28 VITALS
HEART RATE: 88 BPM | TEMPERATURE: 98.1 F | BODY MASS INDEX: 24.97 KG/M2 | WEIGHT: 132.06 LBS | SYSTOLIC BLOOD PRESSURE: 118 MMHG | OXYGEN SATURATION: 91 % | DIASTOLIC BLOOD PRESSURE: 67 MMHG | RESPIRATION RATE: 22 BRPM

## 2025-05-28 VITALS
RESPIRATION RATE: 16 BRPM | DIASTOLIC BLOOD PRESSURE: 67 MMHG | WEIGHT: 132.4 LBS | HEIGHT: 61 IN | SYSTOLIC BLOOD PRESSURE: 82 MMHG | HEART RATE: 92 BPM | BODY MASS INDEX: 25 KG/M2 | TEMPERATURE: 97.7 F | OXYGEN SATURATION: 92 %

## 2025-05-28 DIAGNOSIS — C79.31 METASTASIS TO BRAIN: ICD-10-CM

## 2025-05-28 DIAGNOSIS — C34.90 ADENOCARCINOMA OF LUNG, UNSPECIFIED LATERALITY: ICD-10-CM

## 2025-05-28 DIAGNOSIS — Z79.899 HIGH RISK MEDICATION USE: ICD-10-CM

## 2025-05-28 DIAGNOSIS — C34.90 ADENOCARCINOMA OF LUNG, UNSPECIFIED LATERALITY: Primary | ICD-10-CM

## 2025-05-28 DIAGNOSIS — C79.31 SECONDARY MALIGNANT NEOPLASM OF BRAIN: Primary | ICD-10-CM

## 2025-05-28 LAB
ALBUMIN SERPL-MCNC: 3.5 G/DL (ref 3.5–5.2)
ALBUMIN/GLOB SERPL: 1.2 G/DL
ALP SERPL-CCNC: 68 U/L (ref 39–117)
ALT SERPL W P-5'-P-CCNC: 12 U/L (ref 1–33)
ANION GAP SERPL CALCULATED.3IONS-SCNC: 15.4 MMOL/L (ref 5–15)
AST SERPL-CCNC: 20 U/L (ref 1–32)
BASOPHILS # BLD AUTO: 0.01 10*3/MM3 (ref 0–0.2)
BASOPHILS NFR BLD AUTO: 0.1 % (ref 0–1.5)
BILIRUB SERPL-MCNC: 0.5 MG/DL (ref 0–1.2)
BUN SERPL-MCNC: 48.3 MG/DL (ref 8–23)
BUN/CREAT SERPL: 43.1 (ref 7–25)
CALCIUM SPEC-SCNC: 9.3 MG/DL (ref 8.6–10.5)
CHLORIDE SERPL-SCNC: 98 MMOL/L (ref 98–107)
CO2 SERPL-SCNC: 19.6 MMOL/L (ref 22–29)
CREAT SERPL-MCNC: 1.12 MG/DL (ref 0.57–1)
DEPRECATED RDW RBC AUTO: 45.1 FL (ref 37–54)
EGFRCR SERPLBLD CKD-EPI 2021: 49.5 ML/MIN/1.73
EOSINOPHIL # BLD AUTO: 0 10*3/MM3 (ref 0–0.4)
EOSINOPHIL NFR BLD AUTO: 0 % (ref 0.3–6.2)
ERYTHROCYTE [DISTWIDTH] IN BLOOD BY AUTOMATED COUNT: 14 % (ref 12.3–15.4)
FUNGUS WND CULT: NORMAL
FUNGUS WND CULT: NORMAL
GLOBULIN UR ELPH-MCNC: 3 GM/DL
GLUCOSE SERPL-MCNC: 152 MG/DL (ref 65–99)
HCT VFR BLD AUTO: 43.4 % (ref 34–46.6)
HGB BLD-MCNC: 14.7 G/DL (ref 12–15.9)
IMM GRANULOCYTES # BLD AUTO: 0.1 10*3/MM3 (ref 0–0.05)
IMM GRANULOCYTES NFR BLD AUTO: 0.6 % (ref 0–0.5)
LYMPHOCYTES # BLD AUTO: 0.48 10*3/MM3 (ref 0.7–3.1)
LYMPHOCYTES NFR BLD AUTO: 2.8 % (ref 19.6–45.3)
MCH RBC QN AUTO: 29.9 PG (ref 26.6–33)
MCHC RBC AUTO-ENTMCNC: 33.9 G/DL (ref 31.5–35.7)
MCV RBC AUTO: 88.4 FL (ref 79–97)
MONOCYTES # BLD AUTO: 0.15 10*3/MM3 (ref 0.1–0.9)
MONOCYTES NFR BLD AUTO: 0.9 % (ref 5–12)
MYCOBACTERIUM SPEC CULT: NORMAL
MYCOBACTERIUM SPEC CULT: NORMAL
NEUTROPHILS NFR BLD AUTO: 16.41 10*3/MM3 (ref 1.7–7)
NEUTROPHILS NFR BLD AUTO: 95.6 % (ref 42.7–76)
NIGHT BLUE STAIN TISS: NORMAL
NRBC BLD AUTO-RTO: 0 /100 WBC (ref 0–0.2)
PLATELET # BLD AUTO: 217 10*3/MM3 (ref 140–450)
PMV BLD AUTO: 10.7 FL (ref 6–12)
POTASSIUM SERPL-SCNC: 3.9 MMOL/L (ref 3.5–5.2)
PROT SERPL-MCNC: 6.5 G/DL (ref 6–8.5)
RAD ONC ARIA COURSE ID: NORMAL
RAD ONC ARIA COURSE INTENT: NORMAL
RAD ONC ARIA COURSE LAST TREATMENT DATE: NORMAL
RAD ONC ARIA COURSE START DATE: NORMAL
RAD ONC ARIA COURSE TREATMENT ELAPSED DAYS: 0
RAD ONC ARIA FIRST TREATMENT DATE: NORMAL
RAD ONC ARIA PLAN FRACTIONS TREATED TO DATE: 1
RAD ONC ARIA PLAN ID: NORMAL
RAD ONC ARIA PLAN PRESCRIBED DOSE PER FRACTION: 6 GY
RAD ONC ARIA PLAN PRIMARY REFERENCE POINT: NORMAL
RAD ONC ARIA PLAN TOTAL FRACTIONS PRESCRIBED: 5
RAD ONC ARIA PLAN TOTAL PRESCRIBED DOSE: 3000 CGY
RAD ONC ARIA REFERENCE POINT DOSAGE GIVEN TO DATE: 6 GY
RAD ONC ARIA REFERENCE POINT ID: NORMAL
RAD ONC ARIA REFERENCE POINT SESSION DOSAGE GIVEN: 6 GY
RBC # BLD AUTO: 4.91 10*6/MM3 (ref 3.77–5.28)
SODIUM SERPL-SCNC: 133 MMOL/L (ref 136–145)
T4 FREE SERPL-MCNC: 1.4 NG/DL (ref 0.92–1.68)
TSH SERPL DL<=0.05 MIU/L-ACNC: 0.82 UIU/ML (ref 0.27–4.2)
WBC NRBC COR # BLD AUTO: 17.15 10*3/MM3 (ref 3.4–10.8)

## 2025-05-28 PROCEDURE — 77373 STRTCTC BDY RAD THER TX DLVR: CPT | Performed by: RADIOLOGY

## 2025-05-28 PROCEDURE — 80053 COMPREHEN METABOLIC PANEL: CPT

## 2025-05-28 PROCEDURE — 84443 ASSAY THYROID STIM HORMONE: CPT

## 2025-05-28 PROCEDURE — 85025 COMPLETE CBC W/AUTO DIFF WBC: CPT

## 2025-05-28 PROCEDURE — 36415 COLL VENOUS BLD VENIPUNCTURE: CPT

## 2025-05-28 PROCEDURE — 84439 ASSAY OF FREE THYROXINE: CPT

## 2025-05-28 PROCEDURE — G0463 HOSPITAL OUTPT CLINIC VISIT: HCPCS | Performed by: INTERNAL MEDICINE

## 2025-05-28 RX ORDER — LIDOCAINE AND PRILOCAINE 25; 25 MG/G; MG/G
1 CREAM TOPICAL DAILY
Qty: 30 G | Refills: 1 | Status: SHIPPED | OUTPATIENT
Start: 2025-05-28

## 2025-05-28 NOTE — PROGRESS NOTES
Stereotactic Radiosurgery Note    05/28/2025      Treatment Site: Brain metastases  Energy: 6X  Dose: 600 cGy of planned 3000 cGy  #Fx:1  Start Date: 5/28/25  Elapsed Days: 0    NSCLC, Stage IV       Current Outpatient Medications on File Prior to Encounter   Medication Sig    albuterol sulfate  (90 Base) MCG/ACT inhaler Inhale 2 puffs Every 4 (Four) Hours As Needed for Wheezing. (Patient not taking: Reported on 5/27/2025)    amitriptyline (ELAVIL) 10 MG tablet Take 1 tablet by mouth Every Night.    atorvastatin (LIPITOR) 20 MG tablet Take 1 tablet by mouth Every Night.    benzonatate (Tessalon Perles) 100 MG capsule Take 1 capsule by mouth 3 (Three) Times a Day As Needed for Cough.    Diclofenac Sodium (VOLTAREN) 1 % gel gel Apply  topically to the appropriate area as directed 4 (Four) Times a Day.    hydroCHLOROthiazide 25 MG tablet Take 1 tablet by mouth Daily.    ipratropium-albuterol (DUO-NEB) 0.5-2.5 mg/3 ml nebulizer Take 3 mL by nebulization 4 (Four) Times a Day As Needed for Wheezing or Shortness of Air.    levothyroxine (Synthroid) 50 MCG tablet Take 1 tablet by mouth Daily.    lisinopril (PRINIVIL,ZESTRIL) 10 MG tablet Take 1 tablet by mouth Daily.    mirtazapine (REMERON) 15 MG tablet Take 1 tablet by mouth Every Night. (Patient not taking: Reported on 5/27/2025)    mirtazapine (REMERON) 7.5 MG tablet Take 1 tablet by mouth Every Night.    pantoprazole (Protonix) 40 MG EC tablet Take 1 tablet by mouth Daily.    predniSONE (DELTASONE) 20 MG tablet Take 2 tablets by mouth Daily With Breakfast for 5 days, THEN 1.5 tablets Daily With Breakfast for 7 days, THEN 1 tablet Daily With Breakfast for 7 days, THEN 0.5 tablets Daily With Breakfast for 7 days.    traZODone (DESYREL) 50 MG tablet Take 1 tablet by mouth Every Night.     No current facility-administered medications on file prior to encounter.     Vitals:    05/28/25 1020   BP: 118/67   Pulse: 88   Resp: 22   Temp: 98.1 °F (36.7 °C)   SpO2: 91%  "    Pain Score    05/28/25 1020   PainSc: 0-No pain       Oncology/Hematology History    No history exists.       Review of Systems   Constitutional:  Positive for activity change (decreased due less mobilty), appetite change (poor) and fatigue (7/10). Negative for chills and fever.   Respiratory:  Positive for cough (frequent, dry). Negative for chest tightness, shortness of breath and wheezing.    Musculoskeletal:  Positive for gait problem (unsteady due to RLE weakness). Negative for arthralgias and back pain.   Neurological:  Positive for weakness, numbness (RIGHT index finger), memory problem (\"maybe a little bit.\") and confusion (\"maybe a tiny bit\"). Negative for dizziness, seizures, light-headedness and headache.   Psychiatric/Behavioral:  Negative for sleep disturbance.         Physical Exam  AAO  \"Tired\"   No local skin changes. In a wheelchair.    Comments: A stereotactic ablative radiation plan was devised to deliver the dose as indicated above. The patient was positioned on the treatment couch in a CDR mask immobilization device.  We then aligned with CBCT image guidance, which I personally checked. Once alignment was verified, we delivered the prescription dose using under my guidance.    The Medical Physicist was also in attendance during patient set-up and treatment delivery.    The patient tolerated the procedure without incident.    Nichole Rendon MD  05/28/2025   "

## 2025-05-29 ENCOUNTER — TELEPHONE (OUTPATIENT)
Dept: ONCOLOGY | Facility: HOSPITAL | Age: 80
End: 2025-05-29
Payer: MEDICARE

## 2025-05-29 ENCOUNTER — HOSPITAL ENCOUNTER (OUTPATIENT)
Dept: RADIATION ONCOLOGY | Facility: HOSPITAL | Age: 80
Discharge: HOME | End: 2025-05-29

## 2025-05-29 ENCOUNTER — READMISSION MANAGEMENT (OUTPATIENT)
Dept: CALL CENTER | Facility: HOSPITAL | Age: 80
End: 2025-05-29
Payer: MEDICARE

## 2025-05-29 ENCOUNTER — DOCUMENTATION (OUTPATIENT)
Dept: PHARMACY | Facility: HOSPITAL | Age: 80
End: 2025-05-29
Payer: MEDICARE

## 2025-05-29 DIAGNOSIS — R93.89 ABNORMAL FINDING ON CT SCAN: Primary | ICD-10-CM

## 2025-05-29 DIAGNOSIS — C34.90 ADENOCARCINOMA OF LUNG, UNSPECIFIED LATERALITY: Primary | ICD-10-CM

## 2025-05-29 DIAGNOSIS — C79.31 SECONDARY MALIGNANT NEOPLASM OF BRAIN: Primary | ICD-10-CM

## 2025-05-29 DIAGNOSIS — R92.8 OTHER ABNORMAL AND INCONCLUSIVE FINDINGS ON DIAGNOSTIC IMAGING OF BREAST: ICD-10-CM

## 2025-05-29 DIAGNOSIS — N63.21 UNSPECIFIED LUMP IN THE LEFT BREAST, UPPER OUTER QUADRANT: ICD-10-CM

## 2025-05-29 LAB
RAD ONC ARIA COURSE ID: NORMAL
RAD ONC ARIA COURSE INTENT: NORMAL
RAD ONC ARIA COURSE LAST TREATMENT DATE: NORMAL
RAD ONC ARIA COURSE START DATE: NORMAL
RAD ONC ARIA COURSE TREATMENT ELAPSED DAYS: 1
RAD ONC ARIA FIRST TREATMENT DATE: NORMAL
RAD ONC ARIA PLAN FRACTIONS TREATED TO DATE: 2
RAD ONC ARIA PLAN ID: NORMAL
RAD ONC ARIA PLAN PRESCRIBED DOSE PER FRACTION: 6 GY
RAD ONC ARIA PLAN PRIMARY REFERENCE POINT: NORMAL
RAD ONC ARIA PLAN TOTAL FRACTIONS PRESCRIBED: 5
RAD ONC ARIA PLAN TOTAL PRESCRIBED DOSE: 3000 CGY
RAD ONC ARIA REFERENCE POINT DOSAGE GIVEN TO DATE: 12 GY
RAD ONC ARIA REFERENCE POINT ID: NORMAL
RAD ONC ARIA REFERENCE POINT SESSION DOSAGE GIVEN: 6 GY

## 2025-05-29 PROCEDURE — 77373 STRTCTC BDY RAD THER TX DLVR: CPT | Performed by: RADIOLOGY

## 2025-05-29 NOTE — PROGRESS NOTES
Stereotactic Radiosurgery Note    05/29/2025        Treatment Site: Posterior horn of left ventricle; left thalamus  Energy: 6X  Dose: 1200 cGy  #Fx: 2  Start Date: 5/28/2025  Elapsed Days: 1          Current Outpatient Medications on File Prior to Encounter   Medication Sig    albuterol sulfate  (90 Base) MCG/ACT inhaler Inhale 2 puffs Every 4 (Four) Hours As Needed for Wheezing. (Patient not taking: Reported on 5/28/2025)    amitriptyline (ELAVIL) 10 MG tablet Take 1 tablet by mouth Every Night.    atorvastatin (LIPITOR) 20 MG tablet Take 1 tablet by mouth Every Night.    benzonatate (Tessalon Perles) 100 MG capsule Take 1 capsule by mouth 3 (Three) Times a Day As Needed for Cough.    Diclofenac Sodium (VOLTAREN) 1 % gel gel Apply  topically to the appropriate area as directed 4 (Four) Times a Day.    hydroCHLOROthiazide 25 MG tablet Take 1 tablet by mouth Daily.    ipratropium-albuterol (DUO-NEB) 0.5-2.5 mg/3 ml nebulizer Take 3 mL by nebulization 4 (Four) Times a Day As Needed for Wheezing or Shortness of Air.    levothyroxine (Synthroid) 50 MCG tablet Take 1 tablet by mouth Daily.    lidocaine-prilocaine (EMLA) 2.5-2.5 % cream Apply 1 Application topically to the appropriate area as directed Daily. Put on port site prior to chemotherapy appointments.    lisinopril (PRINIVIL,ZESTRIL) 10 MG tablet Take 1 tablet by mouth Daily.    mirtazapine (REMERON) 15 MG tablet Take 1 tablet by mouth Every Night. (Patient not taking: Reported on 5/28/2025)    mirtazapine (REMERON) 7.5 MG tablet Take 1 tablet by mouth Every Night.    pantoprazole (Protonix) 40 MG EC tablet Take 1 tablet by mouth Daily.    predniSONE (DELTASONE) 20 MG tablet Take 2 tablets by mouth Daily With Breakfast for 5 days, THEN 1.5 tablets Daily With Breakfast for 7 days, THEN 1 tablet Daily With Breakfast for 7 days, THEN 0.5 tablets Daily With Breakfast for 7 days.    traZODone (DESYREL) 50 MG tablet Take 1 tablet by mouth Every Night.     No  current facility-administered medications on file prior to encounter.     There were no vitals filed for this visit.  There were no vitals filed for this visit.    Oncology/Hematology History   Adenocarcinoma of lung   5/28/2025 Initial Diagnosis    Adenocarcinoma of lung     5/28/2025 -  Chemotherapy    OP LUNG Pembrolizumab 200 mg / Pemetrexed / Carboplatin AUC=5         Review of Systems       Physical Exam    Comments: A stereotactic ablative radiation plan was devised to deliver the dose as indicated above. The patient was positioned on the treatment couch in a CDR mask immobilization device.  We then aligned with CBCT image guidance, which I personally checked. Once alignment was verified, we delivered the prescription dose using under my guidance.    The Medical Physicist was also in attendance during patient set-up and treatment delivery.    The patient tolerated the procedure without incident.    Dl Segundo MD  05/29/2025

## 2025-05-29 NOTE — PROGRESS NOTES
"PHARMACY C1D1 PRE VISIT ASSESSMENT    Patient will present for C1D1 of carboplatin AUC 5 + pemetrexed 500 mg/m2 + pembrolizumab 200 mg Q21D x 4 cycles followed by pembrolizumab maintenance therapy    79 y.o. female  Estimated body surface area is 1.59 meters squared as calculated from the following:    Height as of 5/28/25: 154.9 cm (60.98\").    Weight as of 5/28/25: 60.1 kg (132 lb 6.4 oz).    Past Medical History:   Diagnosis Date    Arthritis     Depression     Endometriosis     GERD (gastroesophageal reflux disease) 02/12/2015    Hyperlipidemia     Hypertension     Hypothyroidism     Lung cancer     Osteoporosis     Vitamin D deficiency        Oncology/Hematology History   Adenocarcinoma of lung   5/28/2025 Initial Diagnosis    Adenocarcinoma of lung     5/28/2025 -  Chemotherapy    OP LUNG Pembrolizumab 200 mg / Pemetrexed / Carboplatin AUC=5         ONC Staging:  cT4,cN2,M1c     Relevant Pathology:   Final Diagnosis   Pleural fluid, left, thoracentesis:                - Metastatic adenocarcinoma, lung primary     The above positive (malignant) diagnosis was called to Gi Trivedi RN in Dr. UZMA Tompkins's office at 15:22 EDT on 5/9/2025 by Farhad TREVINO       Electronically signed by Jonathan Mosquera MD on 5/9/2025 at 1546 EDT   EGFR, ALK, ROS1 mut NEGATIVE  Potentially Actionable Mutation Present: YES PDL1 TPS 90%      Relevant Imaging:    NM PET/CT Skull Base to Mid Thigh  Result Date: 5/15/2025  Impression: 1. Large area of hypermetabolic consolidation in the left upper lobe and lingula consistent with the patient's history of adenocarcinoma. Hypermetabolic mediastinal and left hilar adenopathy. 2. Small left pleural effusion. 3. 7 mm hypermetabolic left adrenal nodule consistent with a metastatic lesion. 8 mm hypermetabolic lymph node adjacent to the left bulmaro of the diaphragm just below the level of the left renal vein consistent with a metastatic lesion. Hypermetabolic mesenteric adenopathy suspicious for " metastatic disease. 4. Hypermetabolic activity along the posterior aspect of the right fifth rib and along the inferior endplate of L4 without discrete lesions on the CT images. Muscular/degenerative activity is favored over metastatic disease. Electronically Signed: Marcel Desai MD  5/15/2025 2:50 PM EDT  Workstation ID: [id number]    MRI Brain With & Without Contrast  Result Date: 5/13/2025  Impression: 1.Numerous supratentorial and infratentorial enhancing lesions, compatible with metastasis. Some have surrounding vasogenic edema, but with no acute herniation. 2.No acute intracranial process identified. 3.Findings suggestive of mild chronic small vessel ischemic disease. Electronically Signed: Jorge Carver MD  5/13/2025 3:57 PM EDT  Workstation ID: [id number]    XR Chest 1 View  Result Date: 5/7/2025  Impression: 1.No pneumothorax following left thoracentesis. 2.Increase in left lung infiltrate. 3.Large hiatal hernia. Electronically Signed: Hamlet Barker MD  5/7/2025 12:58 PM EDT  Workstation ID: [id number]    CT Chest With Contrast Diagnostic  Result Date: 5/6/2025  1.There is masslike consolidation in the left upper lobe and lingula with septal thickening and centrilobular nodules in the left lower lobe. There is a moderate size left pleural effusion. Findings are suspicious for pneumonia. Underlying malignancy cannot be excluded. Recommend bronchoscopy directed towards the left upper lobe or lingula for evaluation. 2.There is a mass in the upper outer left breast measuring up to 1.5 cm. Diagnostic mammogram and ultrasound is recommended. 3.There is an enhancing mass in the right posterior hepatic lobe measuring 11 mm. This could be seen with a flash filling hemangioma. Metastatic disease cannot be excluded. 4.There are small sclerotic foci in the T10 and T11 vertebral bodies. These could be seen with bone islands. Metastatic disease cannot be excluded. 5.Large hernia with stomach above the diaphragm. 6.Mildly  enlarged mediastinal lymph nodes could be reactive or metastatic. 7.PET/CT may be helpful for better evaluation. Electronically Signed: Alexus Dillard MD  5/6/2025 8:46 PM EDT  Workstation ID: [id number]    XR Chest 1 View  Result Date: 5/6/2025  Impression: Diffuse airspace opacity in the left lung. This is slightly progressed from previous exam. CT chest is recommended. Persistent pneumonia versus neoplastic process. Electronically Signed: Alexus Dillard MD  5/6/2025 4:54 PM EDT  Workstation ID: [id number]       Current treatment regimen has insurance authorization approval? NO AUTH REQUIRED    Medication treatment plan entered is appropriate per NCCN Guidelines    Pharmacy Follow-up Considerations:   Patient with newly diagnosed metastatic adenocarcinoma of the lung. Of note PDL1 TPS 90%. Current plan to undergo chemoimmunotherapy with carboplatin + pemetrexed + pembrolizumab. Pharmacy will continue to monitor labs while on treatment and will  patient prior to first infusion on cycle 1 day 1.     Rogelio Lopez, PharmD, BCPS  5/29/2025  10:33 EDT

## 2025-05-29 NOTE — OUTREACH NOTE
COPD/PN Week 3 Survey      Flowsheet Row Responses   [facility] patient discharged from? [facility]   Does the patient have one of the following disease processes/diagnoses(primary or secondary)? Pneumonia   Week 3 attempt successful? No   Unsuccessful attempts Attempt 1            Roberto ROLLE - Registered Nurse

## 2025-05-30 ENCOUNTER — HOSPITAL ENCOUNTER (OUTPATIENT)
Dept: INFUSION THERAPY | Facility: HOSPITAL | Age: 80
Discharge: HOME | End: 2025-05-30
Attending: INTERNAL MEDICINE | Admitting: INTERNAL MEDICINE
Payer: MEDICARE

## 2025-05-30 ENCOUNTER — PREP FOR SURGERY (OUTPATIENT)
Dept: OTHER | Facility: HOSPITAL | Age: 80
End: 2025-05-30
Payer: MEDICARE

## 2025-05-30 ENCOUNTER — TELEPHONE (OUTPATIENT)
Dept: INTERNAL MEDICINE | Facility: CLINIC | Age: 80
End: 2025-05-30
Payer: MEDICARE

## 2025-05-30 ENCOUNTER — TELEPHONE (OUTPATIENT)
Dept: SURGERY | Facility: CLINIC | Age: 80
End: 2025-05-30
Payer: MEDICARE

## 2025-05-30 ENCOUNTER — PATIENT OUTREACH (OUTPATIENT)
Dept: CASE MANAGEMENT | Facility: OTHER | Age: 80
End: 2025-05-30
Payer: MEDICARE

## 2025-05-30 ENCOUNTER — HOSPITAL ENCOUNTER (OUTPATIENT)
Dept: RADIATION ONCOLOGY | Facility: HOSPITAL | Age: 80
Discharge: HOME | End: 2025-05-30
Payer: MEDICARE

## 2025-05-30 ENCOUNTER — DOCUMENTATION (OUTPATIENT)
Dept: RADIATION ONCOLOGY | Facility: HOSPITAL | Age: 80
End: 2025-05-30
Payer: MEDICARE

## 2025-05-30 ENCOUNTER — HOSPITAL ENCOUNTER (OUTPATIENT)
Dept: RADIATION ONCOLOGY | Facility: HOSPITAL | Age: 80
Discharge: HOME | End: 2025-05-30

## 2025-05-30 VITALS
SYSTOLIC BLOOD PRESSURE: 87 MMHG | HEART RATE: 90 BPM | RESPIRATION RATE: 16 BRPM | TEMPERATURE: 97.4 F | OXYGEN SATURATION: 94 % | DIASTOLIC BLOOD PRESSURE: 60 MMHG

## 2025-05-30 VITALS
BODY MASS INDEX: 24.63 KG/M2 | SYSTOLIC BLOOD PRESSURE: 76 MMHG | HEART RATE: 96 BPM | OXYGEN SATURATION: 95 % | DIASTOLIC BLOOD PRESSURE: 48 MMHG | WEIGHT: 130.29 LBS | TEMPERATURE: 97.6 F | RESPIRATION RATE: 16 BRPM

## 2025-05-30 DIAGNOSIS — C80.1 CANCER: Primary | ICD-10-CM

## 2025-05-30 DIAGNOSIS — C34.90 ADENOCARCINOMA OF LUNG, UNSPECIFIED LATERALITY: Primary | ICD-10-CM

## 2025-05-30 DIAGNOSIS — C79.31 SECONDARY MALIGNANT NEOPLASM OF BRAIN: Primary | ICD-10-CM

## 2025-05-30 DIAGNOSIS — J90 RECURRENT PLEURAL EFFUSION ON LEFT: ICD-10-CM

## 2025-05-30 DIAGNOSIS — N18.31 STAGE 3A CHRONIC KIDNEY DISEASE: ICD-10-CM

## 2025-05-30 LAB
RAD ONC ARIA COURSE ID: NORMAL
RAD ONC ARIA COURSE INTENT: NORMAL
RAD ONC ARIA COURSE LAST TREATMENT DATE: NORMAL
RAD ONC ARIA COURSE START DATE: NORMAL
RAD ONC ARIA COURSE TREATMENT ELAPSED DAYS: 2
RAD ONC ARIA FIRST TREATMENT DATE: NORMAL
RAD ONC ARIA PLAN FRACTIONS TREATED TO DATE: 3
RAD ONC ARIA PLAN ID: NORMAL
RAD ONC ARIA PLAN PRESCRIBED DOSE PER FRACTION: 6 GY
RAD ONC ARIA PLAN PRIMARY REFERENCE POINT: NORMAL
RAD ONC ARIA PLAN TOTAL FRACTIONS PRESCRIBED: 5
RAD ONC ARIA PLAN TOTAL PRESCRIBED DOSE: 3000 CGY
RAD ONC ARIA REFERENCE POINT DOSAGE GIVEN TO DATE: 18 GY
RAD ONC ARIA REFERENCE POINT ID: NORMAL
RAD ONC ARIA REFERENCE POINT SESSION DOSAGE GIVEN: 6 GY

## 2025-05-30 PROCEDURE — 76604 US EXAM CHEST: CPT | Performed by: INTERNAL MEDICINE

## 2025-05-30 PROCEDURE — G0463 HOSPITAL OUTPT CLINIC VISIT: HCPCS

## 2025-05-30 NOTE — TELEPHONE ENCOUNTER
Lilian a nurse at the hospital in Radiation/ Oncology called in this morning to report that pt was at the hospital getting treatment and her blood pressure has been running soft Lilian reported pt's BP this morning was 76/48 and pt's pressures have been averaging low 80's/ mid 60's, Lilian reported pt is taking the hydrochlorothiazide 25 mg and the lisinopril 10 mg, Lilian reported that pt did not take the hydrochlorothiazide this morning on the lisinopril, Lilian was asking for advice from PCP on what to do about her BP, spoke with provider in office, provider in office recommended holding both medications and to continue to monitor BP, PCP did state if bp was higher than 130/80 pt is to take half of her lisinopril making it a 5 mg dose, and PCP wants to see pt in 2 weeks for follow up. I explained all this to Lilian on the phone and told her that we scheduled pt a follow up with PCP for 6/13 at 230 pm. Pt uses mychart and is active so apt reminder will go in there, Lilian verbalized understanding and had no further questions at this time.

## 2025-05-30 NOTE — PROGRESS NOTES
Stereotactic Radiosurgery Note    05/30/2025        Treatment Site: Posterior horn of left ventricle; left thalamus  Energy: 6X  Dose: 1800 cGy  #Fx: 3  Start Date: 5/28/2025  Elapsed Days: 2          Current Outpatient Medications on File Prior to Encounter   Medication Sig    albuterol sulfate  (90 Base) MCG/ACT inhaler Inhale 2 puffs Every 4 (Four) Hours As Needed for Wheezing. (Patient not taking: Reported on 5/28/2025)    amitriptyline (ELAVIL) 10 MG tablet Take 1 tablet by mouth Every Night.    atorvastatin (LIPITOR) 20 MG tablet Take 1 tablet by mouth Every Night.    benzonatate (Tessalon Perles) 100 MG capsule Take 1 capsule by mouth 3 (Three) Times a Day As Needed for Cough.    Diclofenac Sodium (VOLTAREN) 1 % gel gel Apply  topically to the appropriate area as directed 4 (Four) Times a Day.    hydroCHLOROthiazide 25 MG tablet Take 1 tablet by mouth Daily.    ipratropium-albuterol (DUO-NEB) 0.5-2.5 mg/3 ml nebulizer Take 3 mL by nebulization 4 (Four) Times a Day As Needed for Wheezing or Shortness of Air.    levothyroxine (Synthroid) 50 MCG tablet Take 1 tablet by mouth Daily.    lidocaine-prilocaine (EMLA) 2.5-2.5 % cream Apply 1 Application topically to the appropriate area as directed Daily. Put on port site prior to chemotherapy appointments.    lisinopril (PRINIVIL,ZESTRIL) 10 MG tablet Take 1 tablet by mouth Daily.    mirtazapine (REMERON) 15 MG tablet Take 1 tablet by mouth Every Night. (Patient not taking: Reported on 5/28/2025)    mirtazapine (REMERON) 7.5 MG tablet Take 1 tablet by mouth Every Night.    pantoprazole (Protonix) 40 MG EC tablet Take 1 tablet by mouth Daily.    predniSONE (DELTASONE) 20 MG tablet Take 2 tablets by mouth Daily With Breakfast for 5 days, THEN 1.5 tablets Daily With Breakfast for 7 days, THEN 1 tablet Daily With Breakfast for 7 days, THEN 0.5 tablets Daily With Breakfast for 7 days.    traZODone (DESYREL) 50 MG tablet Take 1 tablet by mouth Every Night.     No  current facility-administered medications on file prior to encounter.     Vitals:    05/30/25 1034   BP: (!) 76/48   Pulse: 96   Resp: 16   Temp: 97.6 °F (36.4 °C)   SpO2: 95%     Pain Score    05/30/25 1034   PainSc: 0-No pain       Oncology/Hematology History   Adenocarcinoma of lung   5/28/2025 Initial Diagnosis    Adenocarcinoma of lung     5/28/2025 -  Chemotherapy    OP LUNG Pembrolizumab 200 mg / Pemetrexed / Carboplatin AUC=5         Review of Systems   Constitutional:  Positive for appetite change (decreased) and fatigue (8/10).   HENT:  Negative for sore throat, tinnitus and trouble swallowing.    Eyes:  Negative for visual disturbance.   Respiratory:  Positive for cough (dry cough, occasional clear production) and shortness of breath (with exertion).    Cardiovascular:  Negative for leg swelling.   Gastrointestinal:  Positive for constipation. Negative for abdominal pain, diarrhea, nausea and vomiting.   Genitourinary:  Negative for difficulty urinating, frequency and urgency.   Musculoskeletal:  Positive for arthralgias and back pain. Negative for neck pain.   Neurological:  Positive for weakness. Negative for dizziness and headache.   Psychiatric/Behavioral:  Positive for sleep disturbance (due to cough).           Physical Exam    Comments: A stereotactic ablative radiation plan was devised to deliver the dose as indicated above. The patient was positioned on the treatment couch in a CDR mask immobilization device.  We then aligned with CBCT image guidance, which I personally checked. Once alignment was verified, we delivered the prescription dose using under my guidance.    The Medical Physicist was also in attendance during patient set-up and treatment delivery.    The patient tolerated the procedure without incident.    Dl Segundo MD  05/30/2025

## 2025-05-30 NOTE — PROGRESS NOTES
CHEMOTHERAPY PREPARATION    Nancie Grissom  [id number]  1945    Chief Complaint:   Chemotherapy Education    History of present illness:  Nancie Grissom is a 79 y.o. year old female who is here today for chemotherapy preparation and needs assessment.  The patient has been diagnosed with Metastatic Lung Cancer and is scheduled to begin treatment with keytruda, pemetrexed and carboplatin every 21 days for 4 cycles.     Oncology History:    Oncology/Hematology History   Adenocarcinoma of lung   5/28/2025 Initial Diagnosis    Adenocarcinoma of lung     6/3/2025 -  Chemotherapy    OP LUNG Pembrolizumab 200 mg / Pemetrexed / Carboplatin AUC=5         The current medication list and allergy list were reviewed and reconciled.     Past Medical History, Past Surgical History, Social History, Family History have been reviewed and are without significant changes except as mentioned.    Review of Systems:    Review of Systems    Physical Exam:    Physical Exam  Vitals and nursing note reviewed.   Constitutional:       Appearance: Normal appearance.   HENT:      Head: Normocephalic.   Cardiovascular:      Rate and Rhythm: Normal rate.   Pulmonary:      Effort: Pulmonary effort is normal.   Neurological:      Mental Status: She is alert.   Psychiatric:         Mood and Affect: Mood normal.         Behavior: Behavior normal.         Thought Content: Thought content normal.         Judgment: Judgment normal.          There were no vitals filed for this visit.  There were no vitals filed for this visit.       ECOG: {findings; ecog performance status:64179            NEEDS ASSESSMENTS    VAD Assessment  The patient and I discussed planned intravenous chemotherapy as well as other IV treatments that are often needed throughout the course of treatment. These may include, but are not limited to blood transfusions, antibiotics, and IV hydration. The vasculature does not appear to be adequate for multiple peripheral IVs throughout their  treatment course. Discussed risks and benefits of VADs. The patient would like to pursue Port-A-Cath insertion prior to initiation of treatment.       Palliative Care  The patient and I discussed palliative care services. Palliative care is not the same as Hospice care. This is specialized medical care for people living with serious illness with the goal of improving quality of life for the patient and their family. [facility] has partnered with [facility] to offer our patients outpatient palliative care early along with their treatment to assist in coordination of care, symptom management, pain management, and medical decision making.  Oncology criteria for palliative care referral is met at this time. The patient is not interested in a palliative care consultation.     Additional Referral needs  none      CHEMOTHERAPY EDUCATION    Booklets Given: Chemotherapy and You [x]  Eating Hints [x]    Sexuality/Fertility Books []      Chemotherapy/Biotherapy Education Sheets: (list all that apply)  nausea management, acid reflux management, diarrhea management, Cancer resourse contacts information, skin and mouth care, and vaccination information     Education Materials attached to AVS:  Chemotherapy; Preparing for Chemotherapy Adult; Precautions When Utilizing Cytotoxic Medicine                                                                                                                                                                Chemotherapy Regimen:   Treatment Plans       Name Type Plan Dates Plan Provider         Active    OP LUNG Pembrolizumab 200 mg / Pemetrexed / Carboplatin AUC=5 ONCOLOGY TREATMENT 5/19/2025 - Present Cristi Rios MD                      TOPICS EDUCATION PROVIDED COMMENTS   ANEMIA:  role of RBC, cause, s/s, ways to manage, role of transfusion [x]    THROMBOCYTOPENIA:  role of platelet, cause, s/s, ways to prevent bleeding, things to avoid, when to seek help [x]    NEUTROPENIA:  role of WBC, cause,  infection precautions, s/s of infection, when to call MD [x]    NUTRITION & APPETITE CHANGES:  importance of maintaining healthy diet & weight, ways to manage to improve intake, dietary consult, exercise regimen [x]    DIARRHEA:  causes, s/s of dehydration, ways to manage, dietary changes, when to call MD [x]    CONSTIPATION:  causes, ways to manage, dietary changes, when to call MD [x]    NAUSEA & VOMITING:  cause, use of antiemetics, dietary changes, when to call MD [x]    MOUTH SORES:  causes, oral care, ways to manage [x]    ALOPECIA:  cause, ways to manage, resources [x]    INFERTILITY & SEXUALITY:  causes, fertility preservation options, sexuality changes, ways to manage, importance of birth control []    NERVOUS SYSTEM CHANGES:  causes, s/s, neuropathies, cognitive changes, ways to manage [x]    PAIN:  causes, ways to manage [x]    SKIN & NAIL CHANGES:  cause, s/s, ways to manage [x]    ORGAN TOXICITIES:  cause, s/s, need for diagnostic tests, labs, when to notify MD [x]    SURVIVORSHIP:  distress, distress assessment, secondary malignancies, early/late effects, follow-up, social issues, social support []    HOME CARE:  use of spill kits, storing of PO chemo, how to manage bodily fluids [x]    MISCELLANEOUS:  drug interactions, administration, vesicant, et [x]        Assessment and Plan:    Diagnoses and all orders for this visit:    1. Adenocarcinoma of lung, unspecified laterality (Primary)  -     folic acid (FOLVITE) 1 MG tablet; Take 1 tablet by mouth Daily.  Dispense: 90 tablet; Refill: 1  -     ondansetron (ZOFRAN) 8 MG tablet; Take 1 tablet by mouth Every 8 (Eight) Hours As Needed for Nausea or Vomiting.  Dispense: 30 tablet; Refill: 2  -     OLANZapine (zyPREXA) 5 MG tablet; Take 1 tablet by mouth Every Night. Take one tablet nightly for 4 nights starting on day of chemotherapy.  Dispense: 16 tablet; Refill: 0  -     dexAMETHasone (DECADRON) 4 MG tablet; Take 1 tablet by mouth Daily With Breakfast. 1  tablet twice daily for 3 days, day prior to chemo, day of chemo, day after chemo  Dispense: 24 tablet; Refill: 0  -     loperamide (IMODIUM) 2 MG capsule; Take 1 capsule by mouth 4 (Four) Times a Day As Needed for Diarrhea. 2 tablets at onset of diarrhea, one tablet after each loose BM up to 8 in 24 hours  Dispense: 30 capsule; Refill: 0  -     Clinic Appointment Request; Future  -     Infusion Appointment Request; Future    2. Metastasis to brain  -     dexAMETHasone (DECADRON) 4 MG tablet; Take 1 tablet by mouth Daily With Breakfast. 1 tablet twice daily for 3 days, day prior to chemo, day of chemo, day after chemo  Dispense: 24 tablet; Refill: 0  -     loperamide (IMODIUM) 2 MG capsule; Take 1 capsule by mouth 4 (Four) Times a Day As Needed for Diarrhea. 2 tablets at onset of diarrhea, one tablet after each loose BM up to 8 in 24 hours  Dispense: 30 capsule; Refill: 0    3. Encounter for health education  Comments:  Chemo and Immunotherapy Education    Other orders  -     Cancel: cyanocobalamin injection 1,000 mcg      Rheum Arthritis - [place] -     The patient and I have reviewed their cancer diagnosis and scheduled treatment plan. Needs assessment was completed including genetics, psychosocial needs, barriers to care, VAD evaluation, advanced care planning, and palliative care services. Referrals have been ordered as appropriate based upon our evaluation and patient desires.     Chemotherapy teaching was also completed today as documented above. Adequate time was given to answer all questions to her satisfaction. Patient and family are aware of their care team members and contact information if they have questions or problems throughout the treatment course. The patient is adequately prepared to begin treatment as scheduled.     Reviewed with patient education regarding: zofran, zyprexa, folvite, EMLA cream, dexamethasone prescriptions sent to pharmacy.       I advised the patient that they can take Tylenol or  ibuprofen as needed for aches/pains related to cancer/treatment.  I also advised that they can use Senokot or MiraLAX as needed for constipation or Imodium as needed for diarrhea.       I reviewed with the patient the care team members. I also reviewed the option of the urgent care clinic through our oncology office for evaluation and management of symptoms related to treatment.    I spent 60 minutes caring for Nancie on this date of service. This time includes time spent by me in the following activities: preparing for the visit, reviewing tests, obtaining and/or reviewing a separately obtained history, performing a medically appropriate examination and/or evaluation, counseling and educating the patient/family/caregiver, ordering medications, tests, or procedures, documenting information in the medical record, independently interpreting results and communicating that information with the patient/family/caregiver, and care coordination.

## 2025-05-30 NOTE — PROCEDURES
Bedside thoracic ultrasound:     Right showed B lines, curtain sign seen.     Left side showed small to moderate effusion with anechoic space between lung and diaphragm.  Not very large that I feel that it would help significantly with his dyspnea or cough.      Thoracentesis not attempted.      Images stored online.

## 2025-05-30 NOTE — PROGRESS NOTES
OSW received a notification from the radiation therapist that there is a note on file stating please provide patient's son with a fuel voucher. Patient has been offered to wait in the holding area and can be provided with lunch while she waits for her 2pm appointment with the medical oncologist. OSW met with Mrs. Grissom's son in a consult room while she was back on the treatment machine. Her son respectfully declined to receive any fuel assistance, however, they are interested in getting her somewhere comfortable and quiet while she waits for her afternoon appointment upstairs. He also confirmed lunch for Mrs. Grissom and her other son, that is also present today, would be helpful. OSW consulted with the radiation oncology manager and communicated to the nurse that Mrs. Grissom and her family can rest comfortably in one of the radiation oncology pre/post-op rooms after she's completed her treatment, clinic visit and education in the clinic. The manager will then check in with the family regarding their lunch preferences. OSW support remains available.

## 2025-05-30 NOTE — TELEPHONE ENCOUNTER
Whitney with Dr Segundo's office reached out asking if we can do port placement sooner than 6/9. I have re-scheduled her surgery to Wed 6/4 after endo. It is scheduled in [facility]. Whitney has reached out to pt son, Ronen to let him know the change.

## 2025-05-30 NOTE — OUTREACH NOTE
[facility] End of Month Documentation    This Chronic Medical Management Care Plan for Nancie Grissom, 79 y.o. female, has been established; a new plan of care implemented and a new plan of care implemented for the month of May.  A cumulative time of 27  minutes was spent on this patient record this month, including phone call with relative; electronic communication with primary care provider; chart review; face to face with provider.    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  A detailed listing of time spent for chronic care management is tracked within each outreach encounter.  Current medications include:  has a current medication list which includes the following prescription(s): albuterol sulfate hfa, amitriptyline, atorvastatin, benzonatate, diclofenac sodium, hydrochlorothiazide, ipratropium-albuterol, levothyroxine, lidocaine-prilocaine, lisinopril, mirtazapine, mirtazapine, pantoprazole, prednisone, and trazodone. and the patient is reported to be patient is compliant with medication protocol,  Medications are reported to be effective.  Regarding these diagnoses, referrals were made to the following provider(s):  N/A.  All notes on chart for PCP to review.    The patient was monitored remotely for blood pressure; medications.    The patient's physical needs include:  medication education; physical healthcare.     The patient's mental support needs include:  increased support, New cancer diagnosis    The patient's cognitive support needs include:  medication;  increased support; coordination of community providers; emotional care; health care    The patient's psychosocial support needs include:  need for increased support; coordination of community providers; medication management or adherence    The patient's functional needs include: medication education; physical healthcare    The patient's environmental needs include:  not applicable    Care Plan overall comments:  Established    Refer to previous outreach notes for more information on the areas listed above.    Monthly Billing Diagnoses  (C80.1) Cancer    (N18.31) Stage 3a chronic kidney disease    Medications   Medications have been reconciled    Care Plan progress this month:      Recently Modified Care Plans Updates made since 4/29/2025 12:00 AM      No recently modified care plans.             Cancer Treatment Phase       Manage Fatigue (Tiredness)       Start:  05/14/25    Expected End:  05/14/27         My Fatigue Management To Do List       Start:  05/14/25       Why is this important?Cancer treatment and its side effects can drain your energy. It can keep you from doing things you would like to do.There are many things that you can do to manage fatigue.            Keep Nausea and Vomiting Under Control       Start:  05/14/25    Expected End:  05/14/27         My Nausea/Vomiting Control To Do List       Start:  05/14/25       Why is this important?During cancer treatment, it may be hard to keep your weight and strength up.Feeling sick a lot can make life tough.Cancer treatment can also make you feel sick and throw up.Eating a variety of healthy foods that are high in calories and protein will help.Keeping away from foods and smells that might make you feel worse can make a big difference.There are some tips to help you have fewer bad days.            Keep Pain Under Control       Start:  05/14/25    Expected End:  05/14/27         My Pain Control To Do List       Start:  05/14/25       Why is this  important?Day-to-day life can be hard when you have pain.Even a small change in emotion or a physical problem can make pain better or worse.Coping with pain depends on how the mind and body reacts to pain.Pain medicine is just one piece of the treatment puzzle.There are many tools to help manage pain. A combination of them can be used to best meet your needs.            Optimal Care Coordination of Patient With Cancer: Treatment Phase       Start:  05/14/25    Expected End:  05/14/27         Implement Strategies to Prevent or Monitor Anemia or Bleeding       Start:  05/14/25            Alleviate Barriers to Anorexia, Nausea and Vomiting Management       Start:  05/14/25            Alleviate Barriers to Constipation Management       Start:  05/14/25            Alleviate Barriers to Diarrhea Management       Start:  05/14/25            Manage Fatigue and Maintain Strength       Start:  05/14/25            Develop Infection Prevention or Management Plan       Start:  05/14/25            Alleviate Barriers to Optimal Nutrition       Start:  05/14/25            Alleviate Barriers to Oral Mucositis Management       Start:  05/14/25            Facilitate Multimodal Pain Management Plan       Start:  05/14/25            Facilitate Development and Maintenance of Palliative Care Plan       Start:  05/14/25            Support Psychosocial Response to Cancer Diagnosis       Start:  05/14/25            Support Skin Integrity       Start:  05/14/25                Current Specialty Plan of Care Status signed by both patient and provider    Instructions   Patient was provided an electronic copy of care plan  CCM services were explained and offered and patient has accepted these services.  Patient has given their written consent to receive CCM services and understands that this includes the authorization of electronic communication of medical information with the other treating providers.  Patient understands that they may stop CCM  services at any time and these changes will be effective at the end of the calendar month and will effectively revocate the agreement of CCM services.  Patient understands that only one practitioner can furnish and be paid for CCM services during one calendar month.  Patient also understands that there may be co-payment or deductible fees in association with CCM services.  Patient will continue with at least monthly follow-up calls with the Ambulatory .    Emily CASTANON  Ambulatory Case Management    5/30/2025, 09:33 EDT

## 2025-05-30 NOTE — NURSING NOTE
Pt to 3W for outpatient ultrasound guided thoracentesis. VSS, consent reviewed and signed. Pt positioned at side of bed. No need for drainage per Dr. Tompkins after exam with ultrasound. Pt home to self care with family.

## 2025-05-30 NOTE — PROGRESS NOTES
Patient was seen for OTV after treatment today.  Pt's blood pressure was low with 76/48 with machine and 88/60 manual.  Pt has had a trend of low blood pressures the past few weeks.  Pt does report that she is not eating or drinking as much as normal due to decreased appetite.  Patient does take Lisinopril 10mg once daily and HCTZ 25mg once daily that is prescribed by her PCP Dr. Rios. Pt believes that she has only taken the Lisinopril this am and believes that she takes the HCTZ in the evenings.  I reached out Dr. Leblanc's office and spoke with Catherine and advised of the low blood pressure and seeking guidance from Dr. Leblanc in regards to her bp medication.  Per Dr. Deana Alexander she advises the patient to hold her Lisinopril 10mg and HCTZ 25mg and continue to monitor her B/P.  If patient's BP is above 130/80 the patient is supposed to take Lisinopril 10mg 1/2 tablet only.  Dr. Leblanc would like to see the patient in office to follow up in 2 weeks and have scheduled this for 6/13 at 2:30.  I have advised the patient's family in regards to directions for blood pressure medication and have given the parameters of 130/80.  Patient's family v/u.  I have also given them the follow up appt information and they are aware of this as well.  I have also encouraged the patient to increase her fluid intake as well as nutritional intake as this could help bring her bp up.  Answered all questions and concerns and family and patients v/u.

## 2025-05-30 NOTE — TELEPHONE ENCOUNTER
Called pt son & vadim port placement for Monday 6/9/25 with Na in surgery scheduling. Canceled office visit for 6/3. Patient does not take any blood thinners or GLP - 1 meds.

## 2025-06-02 ENCOUNTER — HOSPITAL ENCOUNTER (OUTPATIENT)
Dept: RADIATION ONCOLOGY | Facility: HOSPITAL | Age: 80
Discharge: HOME | End: 2025-06-02

## 2025-06-02 ENCOUNTER — OFFICE VISIT (OUTPATIENT)
Dept: ONCOLOGY | Facility: HOSPITAL | Age: 80
End: 2025-06-02
Payer: MEDICARE

## 2025-06-02 ENCOUNTER — ANESTHESIA EVENT (OUTPATIENT)
Dept: PERIOP | Facility: HOSPITAL | Age: 80
End: 2025-06-02
Payer: MEDICARE

## 2025-06-02 ENCOUNTER — HOSPITAL ENCOUNTER (OUTPATIENT)
Dept: RADIATION ONCOLOGY | Facility: HOSPITAL | Age: 80
Discharge: HOME | End: 2025-06-02
Payer: MEDICARE

## 2025-06-02 ENCOUNTER — HOSPITAL ENCOUNTER (OUTPATIENT)
Dept: ONCOLOGY | Facility: HOSPITAL | Age: 80
Discharge: HOME | End: 2025-06-02
Payer: MEDICARE

## 2025-06-02 ENCOUNTER — HOSPITAL ENCOUNTER (OUTPATIENT)
Dept: RADIATION ONCOLOGY | Facility: HOSPITAL | Age: 80
Setting detail: RADIATION/ONCOLOGY SERIES
End: 2025-06-02
Payer: MEDICARE

## 2025-06-02 VITALS — BODY MASS INDEX: 24.77 KG/M2 | WEIGHT: 131 LBS

## 2025-06-02 VITALS
TEMPERATURE: 97.3 F | BODY MASS INDEX: 24.8 KG/M2 | RESPIRATION RATE: 16 BRPM | HEART RATE: 95 BPM | OXYGEN SATURATION: 94 % | SYSTOLIC BLOOD PRESSURE: 97 MMHG | WEIGHT: 131.17 LBS | DIASTOLIC BLOOD PRESSURE: 69 MMHG

## 2025-06-02 VITALS — HEART RATE: 103 BPM | SYSTOLIC BLOOD PRESSURE: 108 MMHG | DIASTOLIC BLOOD PRESSURE: 68 MMHG

## 2025-06-02 DIAGNOSIS — C34.90 ADENOCARCINOMA OF LUNG, UNSPECIFIED LATERALITY: Primary | ICD-10-CM

## 2025-06-02 DIAGNOSIS — C79.31 SECONDARY MALIGNANT NEOPLASM OF BRAIN: Primary | ICD-10-CM

## 2025-06-02 DIAGNOSIS — Z71.9 ENCOUNTER FOR HEALTH EDUCATION: ICD-10-CM

## 2025-06-02 DIAGNOSIS — C79.31 METASTASIS TO BRAIN: ICD-10-CM

## 2025-06-02 PROBLEM — Z45.2 ENCOUNTER FOR ADJUSTMENT OR MANAGEMENT OF VASCULAR ACCESS DEVICE: Status: ACTIVE | Noted: 2025-06-02

## 2025-06-02 LAB
RAD ONC ARIA COURSE ID: NORMAL
RAD ONC ARIA COURSE INTENT: NORMAL
RAD ONC ARIA COURSE LAST TREATMENT DATE: NORMAL
RAD ONC ARIA COURSE START DATE: NORMAL
RAD ONC ARIA COURSE TREATMENT ELAPSED DAYS: 5
RAD ONC ARIA FIRST TREATMENT DATE: NORMAL
RAD ONC ARIA PLAN FRACTIONS TREATED TO DATE: 4
RAD ONC ARIA PLAN ID: NORMAL
RAD ONC ARIA PLAN PRESCRIBED DOSE PER FRACTION: 6 GY
RAD ONC ARIA PLAN PRIMARY REFERENCE POINT: NORMAL
RAD ONC ARIA PLAN TOTAL FRACTIONS PRESCRIBED: 5
RAD ONC ARIA PLAN TOTAL PRESCRIBED DOSE: 3000 CGY
RAD ONC ARIA REFERENCE POINT DOSAGE GIVEN TO DATE: 24 GY
RAD ONC ARIA REFERENCE POINT ID: NORMAL
RAD ONC ARIA REFERENCE POINT SESSION DOSAGE GIVEN: 6 GY

## 2025-06-02 PROCEDURE — 1160F RVW MEDS BY RX/DR IN RCRD: CPT | Performed by: PHYSICIAN ASSISTANT

## 2025-06-02 PROCEDURE — 96372 THER/PROPH/DIAG INJ SC/IM: CPT

## 2025-06-02 PROCEDURE — 1126F AMNT PAIN NOTED NONE PRSNT: CPT | Performed by: PHYSICIAN ASSISTANT

## 2025-06-02 PROCEDURE — 25010000002 CYANOCOBALAMIN PER 1000 MCG: Performed by: PHYSICIAN ASSISTANT

## 2025-06-02 PROCEDURE — 99215 OFFICE O/P EST HI 40 MIN: CPT | Performed by: PHYSICIAN ASSISTANT

## 2025-06-02 PROCEDURE — 1159F MED LIST DOCD IN RCRD: CPT | Performed by: PHYSICIAN ASSISTANT

## 2025-06-02 PROCEDURE — 77373 STRTCTC BDY RAD THER TX DLVR: CPT | Performed by: RADIOLOGY

## 2025-06-02 RX ORDER — CYANOCOBALAMIN 1000 UG/ML
1000 INJECTION, SOLUTION INTRAMUSCULAR; SUBCUTANEOUS ONCE
Status: COMPLETED | OUTPATIENT
Start: 2025-06-02 | End: 2025-06-02

## 2025-06-02 RX ORDER — CYANOCOBALAMIN 1000 UG/ML
1000 INJECTION, SOLUTION INTRAMUSCULAR; SUBCUTANEOUS ONCE
Status: CANCELLED | OUTPATIENT
Start: 2025-06-02 | End: 2025-06-02

## 2025-06-02 RX ORDER — ONDANSETRON 8 MG/1
8 TABLET, FILM COATED ORAL EVERY 8 HOURS PRN
Qty: 30 TABLET | Refills: 2 | Status: SHIPPED | OUTPATIENT
Start: 2025-06-02

## 2025-06-02 RX ORDER — SODIUM CHLORIDE 0.9 % (FLUSH) 0.9 %
20 SYRINGE (ML) INJECTION AS NEEDED
Status: CANCELLED | OUTPATIENT
Start: 2025-06-04

## 2025-06-02 RX ORDER — HEPARIN SODIUM (PORCINE) LOCK FLUSH IV SOLN 100 UNIT/ML 100 UNIT/ML
500 SOLUTION INTRAVENOUS AS NEEDED
Status: CANCELLED | OUTPATIENT
Start: 2025-06-06

## 2025-06-02 RX ORDER — OLANZAPINE 5 MG/1
5 TABLET, FILM COATED ORAL NIGHTLY
Qty: 16 TABLET | Refills: 0 | Status: SHIPPED | OUTPATIENT
Start: 2025-06-02

## 2025-06-02 RX ORDER — LOPERAMIDE HYDROCHLORIDE 2 MG/1
2 CAPSULE ORAL 4 TIMES DAILY PRN
Qty: 30 CAPSULE | Refills: 0 | Status: SHIPPED | OUTPATIENT
Start: 2025-06-02

## 2025-06-02 RX ORDER — DEXAMETHASONE 4 MG/1
4 TABLET ORAL
Qty: 24 TABLET | Refills: 0 | Status: SHIPPED | OUTPATIENT
Start: 2025-06-02

## 2025-06-02 RX ORDER — FOLIC ACID 1 MG/1
1 TABLET ORAL DAILY
Qty: 90 TABLET | Refills: 1 | Status: SHIPPED | OUTPATIENT
Start: 2025-06-02

## 2025-06-02 RX ADMIN — CYANOCOBALAMIN 1000 MCG: 1000 INJECTION, SOLUTION INTRAMUSCULAR at 14:29

## 2025-06-02 NOTE — PROGRESS NOTES
Stereotactic Radiosurgery Note    06/02/2025        Treatment Site: Posterior horn of left ventricle; left thalamus  Energy: 6X  Dose: 2400 cGy  #Fx: 4  Start Date: 5/28/2025  Elapsed Days: 5          Current Outpatient Medications on File Prior to Encounter   Medication Sig    albuterol sulfate  (90 Base) MCG/ACT inhaler Inhale 2 puffs Every 4 (Four) Hours As Needed for Wheezing. (Patient not taking: Reported on 5/28/2025)    amitriptyline (ELAVIL) 10 MG tablet Take 1 tablet by mouth Every Night.    atorvastatin (LIPITOR) 20 MG tablet Take 1 tablet by mouth Every Night.    benzonatate (Tessalon Perles) 100 MG capsule Take 1 capsule by mouth 3 (Three) Times a Day As Needed for Cough.    Diclofenac Sodium (VOLTAREN) 1 % gel gel Apply  topically to the appropriate area as directed 4 (Four) Times a Day.    hydroCHLOROthiazide 25 MG tablet Take 1 tablet by mouth Daily.    ipratropium-albuterol (DUO-NEB) 0.5-2.5 mg/3 ml nebulizer Take 3 mL by nebulization 4 (Four) Times a Day As Needed for Wheezing or Shortness of Air.    levothyroxine (Synthroid) 50 MCG tablet Take 1 tablet by mouth Daily.    lidocaine-prilocaine (EMLA) 2.5-2.5 % cream Apply 1 Application topically to the appropriate area as directed Daily. Put on port site prior to chemotherapy appointments.    lisinopril (PRINIVIL,ZESTRIL) 10 MG tablet Take 1 tablet by mouth Daily.    mirtazapine (REMERON) 15 MG tablet Take 1 tablet by mouth Every Night. (Patient not taking: Reported on 5/28/2025)    mirtazapine (REMERON) 7.5 MG tablet Take 1 tablet by mouth Every Night.    pantoprazole (Protonix) 40 MG EC tablet Take 1 tablet by mouth Daily.    predniSONE (DELTASONE) 20 MG tablet Take 2 tablets by mouth Daily With Breakfast for 5 days, THEN 1.5 tablets Daily With Breakfast for 7 days, THEN 1 tablet Daily With Breakfast for 7 days, THEN 0.5 tablets Daily With Breakfast for 7 days.    traZODone (DESYREL) 50 MG tablet Take 1 tablet by mouth Every Night.     No  current facility-administered medications on file prior to encounter.     Vitals:    06/02/25 0941   BP: 97/69   Pulse: 95   Resp: 16   Temp: 97.3 °F (36.3 °C)   SpO2: 94%     Pain Score    06/02/25 0941   PainSc: 0-No pain       Oncology/Hematology History   Adenocarcinoma of lung   5/28/2025 Initial Diagnosis    Adenocarcinoma of lung     5/28/2025 -  Chemotherapy    OP LUNG Pembrolizumab 200 mg / Pemetrexed / Carboplatin AUC=5         Review of Systems   Constitutional:  Positive for appetite change (DECREASED) and fatigue (10/10).   HENT:  Negative for sore throat, tinnitus and trouble swallowing.    Eyes:  Negative for visual disturbance.   Respiratory:  Positive for cough (WITH BLOOD TINGED) and shortness of breath (WITH EXERTION).    Gastrointestinal:  Positive for constipation (NO BM SINCE 5/30). Negative for abdominal pain, diarrhea, nausea and vomiting.   Genitourinary:  Positive for dysuria (SLIGHT BURNING WITH URINATION). Negative for difficulty urinating and frequency.   Musculoskeletal:  Negative for back pain.   Neurological:  Negative for dizziness and headache.   Psychiatric/Behavioral:  Positive for sleep disturbance (BROKEN SLEEP DUE TO COUGHING).           Physical Exam    Comments: A stereotactic ablative radiation plan was devised to deliver the dose as indicated above. The patient was positioned on the treatment couch in a CDR mask immobilization device.  We then aligned with CBCT image guidance, which I personally checked. Once alignment was verified, we delivered the prescription dose using under my guidance.    The Medical Physicist was also in attendance during patient set-up and treatment delivery.    The patient tolerated the procedure without incident.    Dl Segundo MD  06/02/2025

## 2025-06-02 NOTE — PRE-PROCEDURE INSTRUCTIONS
IMPORTANT INSTRUCTIONS - PRE-ADMISSION TESTING  DO NOT EAT OR CHEW anything after midnight the night before your procedure.    You may have CLEAR liquids up to _2___ hours prior to ARRIVAL time.   Take the following medications the morning of your procedure with JUST A SIP OF WATER:  ___________________________________________________________________________________________________________________________________________________________________________________LEVOTHYROXINE, PREDNISONE ____    DO NOT BRING your medications to the hospital with you, UNLESS something has changed since your PRE-Admission Testing appointment.  Hold all vitamins, supplements, and NSAIDS (Non- steroidal anti-inflammatory meds) for one week prior to surgery (you MAY take Tylenol or Acetaminophen).  If you are diabetic, check your blood sugar the morning of your procedure. If it is less than 70 or if you are feeling symptomatic, call the following number for further instructions: [phone]_.  Use your inhalers/nebulizers as usual, the morning of your procedure. BRING YOUR INHALERS with you.   Bring your CPAP or BIPAP to hospital, ONLY IF YOU WILL BE SPENDING THE NIGHT.   Make sure you have a ride home and have someone who will stay with you the day of your procedure after you go home.  If you have any questions, please call your Pre-Admission Testing Nurse, JACKSON____ at [phone]______.   Per anesthesia request, do not smoke for 24 hours before your procedure or as instructed by your surgeon.    Clear Liquid Diet        Find out when you need to start a clear liquid diet.   Think of “clear liquids” as anything you could read a newspaper through. This includes things like water, broth, sports drinks, or tea WITHOUT any kind of milk or cream.           Once you are told to start a clear liquid diet, only drink these things until 2 hours before arrival to the hospital or when the hospital says to stop. Total volume limitation: 8 oz.        Clear liquids you CAN drink:   Water   Clear broth: beef, chicken, vegetable, or bone broth with nothing in it   Gatorade   Lemonade or Maurisio-aid   Soda   Tea, coffee (NO cream or honey)   Jell-O (without fruit)   Popsicles (without fruit or cream)   Italian ices   Juice without pulp: apple, white, grape   You may use salt, pepper, and sugar    Do NOT drink:   Milk or cream   Soy milk, almond milk, coconut milk, or other non-dairy drinks and   creamers   Milkshakes or smoothies   Tomato juice   Orange juice   Grapefruit juice   Cream soups or any other than broth         Clear Liquid Diet:  Do NOT eat any solid food.  Do NOT eat or suck on mints or candy.  Do NOT chew gum.  Do NOT drink thick liquids like milk or juice with pulp in it.  Do NOT add milk, cream, or anything like soy milk or almond milk to coffee or tea.       PREOPERATIVE (BEFORE SURGERY)              BATHING INSTRUCTIONS  Instructions:    You will need to shower 1  times utilizing the soap provided; at the times indicated   below:     MORNING OF SURGERY      Wash your hair and face with normal shampoo and soap, rinse it well before using the surgical soap.      In the shower, wet the skin completely with water from your neck to your feet. Apply the cleanser to your   body ONLY FROM THE NECK TO YOUR FEET.     Do NOT USE THE CLEANSER ON YOUR FACE, HEAD, OR GENITAL (PRIVATE) AREAS.   Keep it out of your eyes, ears, and mouth because of the risk of injury to those areas.      Scrub with a clean washcloth for each bath utilizing the soap provided from the top of your body to the   bottom starting at the neck area.      Pay close attention to your armpits, groin area, and the site of surgery.      Wash your body gently for 5 minutes. Stand outside the stream or turn off the water while scrubbing your   body. Do NOT wash with your regular soap after the surgical cleanser is used.      RINSE THE CLEANSER OFF COMPLETELY with plenty of water. Rinse the  area again thoroughly.      Dry off with a clean towel. The surgical soap can cause dryness; however do NOT APPLY LOTION,   CREAM, POWDER, and/or DEODORANT AFTER SHOWERING.     Be sure to where clean clothes after showering.      Ensure CLEAN BED LINENS AFTER FIRST wash with the surgical soap.      NO PETS ALLOWED IN THE BED with you after utilizing the surgical soap.

## 2025-06-03 ENCOUNTER — TELEPHONE (OUTPATIENT)
Dept: ONCOLOGY | Facility: HOSPITAL | Age: 80
End: 2025-06-03
Payer: MEDICARE

## 2025-06-03 ENCOUNTER — HOSPITAL ENCOUNTER (OUTPATIENT)
Dept: RADIATION ONCOLOGY | Facility: HOSPITAL | Age: 80
Discharge: HOME | End: 2025-06-03

## 2025-06-03 VITALS
TEMPERATURE: 98.1 F | SYSTOLIC BLOOD PRESSURE: 89 MMHG | DIASTOLIC BLOOD PRESSURE: 71 MMHG | RESPIRATION RATE: 30 BRPM | OXYGEN SATURATION: 91 % | HEART RATE: 99 BPM

## 2025-06-03 DIAGNOSIS — C79.31 SECONDARY MALIGNANT NEOPLASM OF BRAIN: Primary | ICD-10-CM

## 2025-06-03 LAB
RAD ONC ARIA COURSE ID: NORMAL
RAD ONC ARIA COURSE INTENT: NORMAL
RAD ONC ARIA COURSE LAST TREATMENT DATE: NORMAL
RAD ONC ARIA COURSE START DATE: NORMAL
RAD ONC ARIA COURSE TREATMENT ELAPSED DAYS: 6
RAD ONC ARIA FIRST TREATMENT DATE: NORMAL
RAD ONC ARIA PLAN FRACTIONS TREATED TO DATE: 5
RAD ONC ARIA PLAN ID: NORMAL
RAD ONC ARIA PLAN PRESCRIBED DOSE PER FRACTION: 6 GY
RAD ONC ARIA PLAN PRIMARY REFERENCE POINT: NORMAL
RAD ONC ARIA PLAN TOTAL FRACTIONS PRESCRIBED: 5
RAD ONC ARIA PLAN TOTAL PRESCRIBED DOSE: 3000 CGY
RAD ONC ARIA REFERENCE POINT DOSAGE GIVEN TO DATE: 30 GY
RAD ONC ARIA REFERENCE POINT ID: NORMAL
RAD ONC ARIA REFERENCE POINT SESSION DOSAGE GIVEN: 6 GY

## 2025-06-03 PROCEDURE — 77336 RADIATION PHYSICS CONSULT: CPT | Performed by: RADIOLOGY

## 2025-06-03 PROCEDURE — 77373 STRTCTC BDY RAD THER TX DLVR: CPT | Performed by: RADIOLOGY

## 2025-06-03 RX ORDER — BENZONATATE 100 MG/1
100 CAPSULE ORAL 3 TIMES DAILY PRN
Qty: 50 CAPSULE | Refills: 1 | Status: CANCELLED | OUTPATIENT
Start: 2025-06-03

## 2025-06-03 NOTE — TELEPHONE ENCOUNTER
Spoke with [facility]--clarified that CARLITOS Duarte, would like imodium prescription to read : Take 2 tablets at onset of diarrhea, then one tablet with each loose BM, up to max 8 in 24 hours; pharmacist verified instructions; can call us back with any questions/concerns

## 2025-06-03 NOTE — PROGRESS NOTES
Stereotactic Radiosurgery Note    06/03/2025        Treatment Site: Posterior horn of left ventricle; left thalamus  Energy: 6X  Dose: 3000 cGy  #Fx: 5  Start Date: 5/28/2025  Elapsed Days: 6          Current Outpatient Medications on File Prior to Encounter   Medication Sig    albuterol sulfate  (90 Base) MCG/ACT inhaler Inhale 2 puffs Every 4 (Four) Hours As Needed for Wheezing. (Patient not taking: Reported on 5/27/2025)    amitriptyline (ELAVIL) 10 MG tablet Take 1 tablet by mouth Every Night.    atorvastatin (LIPITOR) 20 MG tablet Take 1 tablet by mouth Every Night.    benzonatate (Tessalon Perles) 100 MG capsule Take 1 capsule by mouth 3 (Three) Times a Day As Needed for Cough.    dexAMETHasone (DECADRON) 4 MG tablet Take 1 tablet by mouth Daily With Breakfast. 1 tablet twice daily for 3 days, day prior to chemo, day of chemo, day after chemo    Diclofenac Sodium (VOLTAREN) 1 % gel gel Apply  topically to the appropriate area as directed 4 (Four) Times a Day.    folic acid (FOLVITE) 1 MG tablet Take 1 tablet by mouth Daily.    hydroCHLOROthiazide 25 MG tablet Take 1 tablet by mouth Daily.    ipratropium-albuterol (DUO-NEB) 0.5-2.5 mg/3 ml nebulizer Take 3 mL by nebulization 4 (Four) Times a Day As Needed for Wheezing or Shortness of Air.    levothyroxine (Synthroid) 50 MCG tablet Take 1 tablet by mouth Daily.    lidocaine-prilocaine (EMLA) 2.5-2.5 % cream Apply 1 Application topically to the appropriate area as directed Daily. Put on port site prior to chemotherapy appointments.    lisinopril (PRINIVIL,ZESTRIL) 10 MG tablet Take 1 tablet by mouth Daily.    loperamide (IMODIUM) 2 MG capsule Take 1 capsule by mouth 4 (Four) Times a Day As Needed for Diarrhea. 2 tablets at onset of diarrhea, one tablet after each loose BM up to 8 in 24 hours    mirtazapine (REMERON) 15 MG tablet Take 1 tablet by mouth Every Night. (Patient not taking: Reported on 6/2/2025)    mirtazapine (REMERON) 7.5 MG tablet Take 1  tablet by mouth Every Night.    OLANZapine (zyPREXA) 5 MG tablet Take 1 tablet by mouth Every Night. Take one tablet nightly for 4 nights starting on day of chemotherapy.    ondansetron (ZOFRAN) 8 MG tablet Take 1 tablet by mouth Every 8 (Eight) Hours As Needed for Nausea or Vomiting.    pantoprazole (Protonix) 40 MG EC tablet Take 1 tablet by mouth Daily. (Patient taking differently: Take 1 tablet by mouth Every Night.)    predniSONE (DELTASONE) 20 MG tablet Take 2 tablets by mouth Daily With Breakfast for 5 days, THEN 1.5 tablets Daily With Breakfast for 7 days, THEN 1 tablet Daily With Breakfast for 7 days, THEN 0.5 tablets Daily With Breakfast for 7 days.    traZODone (DESYREL) 50 MG tablet Take 1 tablet by mouth Every Night.     No current facility-administered medications on file prior to encounter.     Vitals:    06/03/25 1356   BP: (!) 89/71   Pulse: 99   Resp: (!) 30   Temp: 98.1 °F (36.7 °C)   SpO2: 91%       Pain Score    06/03/25 1356   PainSc: 0-No pain         Oncology/Hematology History   Adenocarcinoma of lung   5/28/2025 Initial Diagnosis    Adenocarcinoma of lung     6/3/2025 -  Chemotherapy    OP LUNG Pembrolizumab 200 mg / Pemetrexed / Carboplatin AUC=5         Review of Systems   Constitutional:  Positive for appetite change (Decreased) and fatigue (7/10).   HENT:  Negative for ear pain, hearing loss, sore throat and trouble swallowing.    Eyes:  Positive for visual disturbance (Blurred vision, noted for 2-3 weeks.). Negative for double vision, photophobia, pain, discharge and redness.   Respiratory:  Positive for cough (Productive with blood tinge secretions in the morning, noted for x1-2 weeks.), shortness of breath (with exertion, ongoing) and wheezing (Noted when she sleeps, unsure of how long.). Negative for chest tightness.    Cardiovascular:  Negative for chest pain.   Gastrointestinal:  Positive for constipation (Started dulcolax, no bowel movement since 5/30.). Negative for abdominal  distention, abdominal pain, diarrhea, nausea and vomiting.   Genitourinary:  Positive for decreased urine volume (Darker colored.). Negative for difficulty urinating, dysuria, frequency, hematuria and urgency.        MARY incontinence.   Musculoskeletal:  Positive for back pain (Started the last couple of days.) and gait problem (Shuffled gait, worsening.). Negative for arthralgias.   Skin:  Negative for color change, rash and wound.   Neurological:  Positive for weakness (Noted in right leg- ongoing for month or so.  Weakness in right hand ongoing, r/t arthritis.), numbness (right hand, r/t arthritis.) and memory problem (Forgetfullness, newer for patient). Negative for dizziness, tremors, seizures, syncope, speech difficulty, headache and confusion.   Psychiatric/Behavioral:  Positive for sleep disturbance (Wakes throughout the night r/t coughing.). Negative for decreased concentration, dysphoric mood and depressed mood.           Physical Exam    Comments: A stereotactic ablative radiation plan was devised to deliver the dose as indicated above. The patient was positioned on the treatment couch in a CDR mask immobilization device.  We then aligned with CBCT image guidance, which I personally checked. Once alignment was verified, we delivered the prescription dose using under my guidance.    The Medical Physicist was also in attendance during patient set-up and treatment delivery.    The patient tolerated the procedure without incident.    Dl Segundo MD  06/03/2025

## 2025-06-03 NOTE — TELEPHONE ENCOUNTER
Pharmacy Name:  [facility]       Pharmacy representative name: PAOLA PHARMACIST    Pharmacy representative phone number: [phone] [address] [address]    What medication are you calling in regards to: LOPERAMIDE     What question does the pharmacy have:     HAS TWO DIFFERENT DIRECTIONS ON IT  NEEDING CLARIFICATION     Who is the provider that prescribed the medication: TERRI RODRÍGUEZ

## 2025-06-04 ENCOUNTER — ANESTHESIA (OUTPATIENT)
Dept: PERIOP | Facility: HOSPITAL | Age: 80
End: 2025-06-04
Payer: MEDICARE

## 2025-06-04 ENCOUNTER — APPOINTMENT (OUTPATIENT)
Dept: GENERAL RADIOLOGY | Facility: HOSPITAL | Age: 80
End: 2025-06-04
Payer: MEDICARE

## 2025-06-04 ENCOUNTER — HOSPITAL ENCOUNTER (OUTPATIENT)
Facility: HOSPITAL | Age: 80
Setting detail: HOSPITAL OUTPATIENT SURGERY
Discharge: HOME | End: 2025-06-04
Attending: SURGERY | Admitting: SURGERY
Payer: MEDICARE

## 2025-06-04 ENCOUNTER — READMISSION MANAGEMENT (OUTPATIENT)
Dept: CALL CENTER | Facility: HOSPITAL | Age: 80
End: 2025-06-04
Payer: MEDICARE

## 2025-06-04 VITALS
RESPIRATION RATE: 18 BRPM | SYSTOLIC BLOOD PRESSURE: 111 MMHG | OXYGEN SATURATION: 93 % | HEART RATE: 72 BPM | BODY MASS INDEX: 24.1 KG/M2 | DIASTOLIC BLOOD PRESSURE: 62 MMHG | WEIGHT: 130.95 LBS | HEIGHT: 62 IN | TEMPERATURE: 97.4 F

## 2025-06-04 DIAGNOSIS — C80.1 CANCER: ICD-10-CM

## 2025-06-04 PROBLEM — Z79.899 ENCOUNTER FOR LONG-TERM (CURRENT) USE OF HIGH-RISK MEDICATION: Status: ACTIVE | Noted: 2025-06-04

## 2025-06-04 PROCEDURE — 25010000002 LIDOCAINE PF 2% 2 % SOLUTION

## 2025-06-04 PROCEDURE — 36561 INSERT TUNNELED CV CATH: CPT | Performed by: SURGERY

## 2025-06-04 PROCEDURE — 25010000002 CEFAZOLIN PER 500 MG

## 2025-06-04 PROCEDURE — 71045 X-RAY EXAM CHEST 1 VIEW: CPT

## 2025-06-04 PROCEDURE — 77001 FLUOROGUIDE FOR VEIN DEVICE: CPT | Performed by: SURGERY

## 2025-06-04 PROCEDURE — 25010000002 BUPIVACAINE (PF) 0.25 % SOLUTION: Performed by: SURGERY

## 2025-06-04 PROCEDURE — 76937 US GUIDE VASCULAR ACCESS: CPT | Performed by: SURGERY

## 2025-06-04 PROCEDURE — 76000 FLUOROSCOPY <1 HR PHYS/QHP: CPT

## 2025-06-04 PROCEDURE — 25010000002 HEPARIN (PORCINE) PER 1000 UNITS: Performed by: SURGERY

## 2025-06-04 PROCEDURE — C1788 PORT, INDWELLING, IMP: HCPCS | Performed by: SURGERY

## 2025-06-04 PROCEDURE — 25810000003 LACTATED RINGERS PER 1000 ML

## 2025-06-04 PROCEDURE — 25010000002 PROPOFOL 10 MG/ML EMULSION

## 2025-06-04 DEVICE — POWERPORT CLEARVUE ISP IMPLANTABLE PORT WITH ATTACHABLE 8F POLYURETHANE OPEN-ENDED SINGLE-LUMEN VENOUS CATHETER PROCEDURAL KIT
Type: IMPLANTABLE DEVICE | Site: SUBCLAVIAN | Status: FUNCTIONAL
Brand: POWERPORT CLEARVUE

## 2025-06-04 RX ORDER — ONDANSETRON 2 MG/ML
4 INJECTION INTRAMUSCULAR; INTRAVENOUS ONCE AS NEEDED
Status: DISCONTINUED | OUTPATIENT
Start: 2025-06-04 | End: 2025-06-04 | Stop reason: HOSPADM

## 2025-06-04 RX ORDER — BUPIVACAINE HYDROCHLORIDE 2.5 MG/ML
INJECTION, SOLUTION EPIDURAL; INFILTRATION; INTRACAUDAL; PERINEURAL AS NEEDED
Status: DISCONTINUED | OUTPATIENT
Start: 2025-06-04 | End: 2025-06-04 | Stop reason: HOSPADM

## 2025-06-04 RX ORDER — SODIUM CHLORIDE, SODIUM LACTATE, POTASSIUM CHLORIDE, CALCIUM CHLORIDE 600; 310; 30; 20 MG/100ML; MG/100ML; MG/100ML; MG/100ML
9 INJECTION, SOLUTION INTRAVENOUS CONTINUOUS PRN
Status: DISCONTINUED | OUTPATIENT
Start: 2025-06-04 | End: 2025-06-04 | Stop reason: HOSPADM

## 2025-06-04 RX ORDER — OXYCODONE HYDROCHLORIDE 5 MG/1
5 TABLET ORAL
Status: DISCONTINUED | OUTPATIENT
Start: 2025-06-04 | End: 2025-06-04 | Stop reason: HOSPADM

## 2025-06-04 RX ORDER — SODIUM CHLORIDE, SODIUM LACTATE, POTASSIUM CHLORIDE, CALCIUM CHLORIDE 600; 310; 30; 20 MG/100ML; MG/100ML; MG/100ML; MG/100ML
INJECTION, SOLUTION INTRAVENOUS CONTINUOUS PRN
Status: DISCONTINUED | OUTPATIENT
Start: 2025-06-04 | End: 2025-06-04 | Stop reason: SURG

## 2025-06-04 RX ORDER — CEFAZOLIN SODIUM 1 G/3ML
INJECTION, POWDER, FOR SOLUTION INTRAMUSCULAR; INTRAVENOUS AS NEEDED
Status: DISCONTINUED | OUTPATIENT
Start: 2025-06-04 | End: 2025-06-04 | Stop reason: SURG

## 2025-06-04 RX ORDER — HYDROCODONE BITARTRATE AND ACETAMINOPHEN 5; 325 MG/1; MG/1
1 TABLET ORAL EVERY 4 HOURS PRN
Qty: 5 TABLET | Refills: 0 | Status: SHIPPED | OUTPATIENT
Start: 2025-06-04

## 2025-06-04 RX ORDER — LIDOCAINE HYDROCHLORIDE 20 MG/ML
INJECTION, SOLUTION EPIDURAL; INFILTRATION; INTRACAUDAL; PERINEURAL AS NEEDED
Status: DISCONTINUED | OUTPATIENT
Start: 2025-06-04 | End: 2025-06-04 | Stop reason: SURG

## 2025-06-04 RX ORDER — PROPOFOL 10 MG/ML
VIAL (ML) INTRAVENOUS AS NEEDED
Status: DISCONTINUED | OUTPATIENT
Start: 2025-06-04 | End: 2025-06-04 | Stop reason: SURG

## 2025-06-04 RX ORDER — PROMETHAZINE HYDROCHLORIDE 25 MG/1
25 SUPPOSITORY RECTAL ONCE AS NEEDED
Status: DISCONTINUED | OUTPATIENT
Start: 2025-06-04 | End: 2025-06-04 | Stop reason: HOSPADM

## 2025-06-04 RX ORDER — PROMETHAZINE HYDROCHLORIDE 25 MG/1
25 TABLET ORAL ONCE AS NEEDED
Status: DISCONTINUED | OUTPATIENT
Start: 2025-06-04 | End: 2025-06-04 | Stop reason: HOSPADM

## 2025-06-04 RX ADMIN — PROPOFOL 150 MCG/KG/MIN: 10 INJECTION, EMULSION INTRAVENOUS at 16:02

## 2025-06-04 RX ADMIN — PROPOFOL 20 MG: 10 INJECTION, EMULSION INTRAVENOUS at 16:36

## 2025-06-04 RX ADMIN — SODIUM CHLORIDE, POTASSIUM CHLORIDE, SODIUM LACTATE AND CALCIUM CHLORIDE: 600; 310; 30; 20 INJECTION, SOLUTION INTRAVENOUS at 15:54

## 2025-06-04 RX ADMIN — CEFAZOLIN 2 G: 1 INJECTION, POWDER, FOR SOLUTION INTRAMUSCULAR; INTRAVENOUS; PARENTERAL at 16:07

## 2025-06-04 RX ADMIN — PROPOFOL 80 MG: 10 INJECTION, EMULSION INTRAVENOUS at 16:02

## 2025-06-04 RX ADMIN — LIDOCAINE HYDROCHLORIDE 60 MG: 20 INJECTION, SOLUTION EPIDURAL; INFILTRATION; INTRACAUDAL; PERINEURAL at 16:02

## 2025-06-04 NOTE — OUTREACH NOTE
COPD/PN Week 3 Survey      Flowsheet Row Responses   [facility] patient discharged from? [facility]   Does the patient have one of the following disease processes/diagnoses(primary or secondary)? Pneumonia   Week 3 attempt successful? No   Unsuccessful attempts Attempt 2   Revoke Readmitted            Gillian SIMON - Registered Nurse

## 2025-06-04 NOTE — H&P
Chief Complaint:  No chief complaint on file.    Primary Care Provider: Wen Leblanc MD      History of Present Illness  Nancie Grissom is a 79 y.o. female with a portacatheter placed.  The patient has metastatic lung cancer.  She is going to start IV therapy later this week and portacatheter placement is needed.    Allergies: Methotrexate    Outpatient Medications Marked as Taking for the 6/4/25 encounter (Hospital Encounter)   Medication Sig Dispense Refill    amitriptyline (ELAVIL) 10 MG tablet Take 1 tablet by mouth Every Night. 90 tablet 1    atorvastatin (LIPITOR) 20 MG tablet Take 1 tablet by mouth Every Night. 90 tablet 1    benzonatate (Tessalon Perles) 100 MG capsule Take 1 capsule by mouth 3 (Three) Times a Day As Needed for Cough. 50 capsule 1    folic acid (FOLVITE) 1 MG tablet Take 1 tablet by mouth Daily. 90 tablet 1    hydroCHLOROthiazide 25 MG tablet Take 1 tablet by mouth Daily. 90 tablet 1    ipratropium-albuterol (DUO-NEB) 0.5-2.5 mg/3 ml nebulizer Take 3 mL by nebulization 4 (Four) Times a Day As Needed for Wheezing or Shortness of Air. 360 mL 1    levothyroxine (Synthroid) 50 MCG tablet Take 1 tablet by mouth Daily. 90 tablet 1    lisinopril (PRINIVIL,ZESTRIL) 10 MG tablet Take 1 tablet by mouth Daily. 90 tablet 1    loperamide (IMODIUM) 2 MG capsule Take 1 capsule by mouth 4 (Four) Times a Day As Needed for Diarrhea. 2 tablets at onset of diarrhea, one tablet after each loose BM up to 8 in 24 hours 30 capsule 0    mirtazapine (REMERON) 7.5 MG tablet Take 1 tablet by mouth Every Night. 30 tablet 0    OLANZapine (zyPREXA) 5 MG tablet Take 1 tablet by mouth Every Night. Take one tablet nightly for 4 nights starting on day of chemotherapy. 16 tablet 0    pantoprazole (Protonix) 40 MG EC tablet Take 1 tablet by mouth Daily. (Patient taking differently: Take 1 tablet by mouth Every Night.) 90 tablet 1    predniSONE (DELTASONE) 20 MG tablet Take 2 tablets by mouth Daily With Breakfast for 5 days,  "THEN 1.5 tablets Daily With Breakfast for 7 days, THEN 1 tablet Daily With Breakfast for 7 days, THEN 0.5 tablets Daily With Breakfast for 7 days. 31 tablet 0    traZODone (DESYREL) 50 MG tablet Take 1 tablet by mouth Every Night. 90 tablet 1       Past Medical History:    Arthritis    C. difficile colitis    Depression    Endometriosis    GERD (gastroesophageal reflux disease)    History of radiation therapy    completed on brain    Hyperlipidemia    Hypertension    controlled now    Hypothyroidism    Lung cancer    mets to brain, adrenal, rib    Osteoporosis    Vitamin D deficiency        Past Surgical History:    BRONCHOSCOPY    Procedure: BRONCHOSCOPY WITH BRONCHOALVEOLAR LAVAGE, POSSIBLE BIOPSY, BRUSHING, WASHING, AIRWAY INSPECTION: insertion of lighted instrument to view inside the lung;  Surgeon: Raleigh Tompkins MD;  Location: [facility] MAIN OR;  Service: Pulmonary;  Laterality: N/A;    ROTATOR CUFF REPAIR       Family History:   Family History   Problem Relation Age of Onset    Cancer Father     Asthma Father     Malig Hyperthermia Neg Hx         Social History:  Social History     Tobacco Use    Smoking status: Never    Smokeless tobacco: Never   Substance Use Topics    Alcohol use: Yes     Comment: rarely       Objective     Vital Signs:  Ht 157.5 cm (62\")   Wt 59.4 kg (130 lb 15.3 oz)   BMI 23.95 kg/m²   Respiratory:  breathing not labored, respiratory effort appears normal  Cardiovascular:  heart regular rate  Psychiatric:  judgment and insight intact      Assessment:  Metastatic lung cancer    Plan:  Port-a-catheter placement    Discussion: Indications, options, risks, benefits, and expected outcomes of planned surgery were discussed with the patient and she agrees to proceed.    Manish Catalan MD  6/4/2025    Electronically signed by Manish Catalan MD, 06/04/25, 1:25 PM EDT.        "

## 2025-06-04 NOTE — ANESTHESIA POSTPROCEDURE EVALUATION
Patient: Nancie Grissom    Procedure Summary       Date: 06/04/25 Room / Location: [facility] OR 11 / [facility] MAIN OR    Anesthesia Start: 1557 Anesthesia Stop: 1647    Procedure: INSERTION VENOUS ACCESS DEVICE (Right: Chest) Diagnosis:       Adenocarcinoma of lung, unspecified laterality      (Adenocarcinoma of lung, unspecified laterality [C34.90])    Surgeons: Manish Catalan MD Provider: Dl Mosley MD    Anesthesia Type: general ASA Status: 3            Anesthesia Type: general    Vitals  Vitals Value Taken Time   /73 06/04/25 17:06   Temp 36.3 °C (97.4 °F) 06/04/25 16:45   Pulse 76 06/04/25 17:09   Resp 20 06/04/25 16:55   SpO2 94 % 06/04/25 17:09   Vitals shown include unfiled device data.        Post Anesthesia Care and Evaluation    Patient location during evaluation: bedside  Patient participation: complete - patient participated  Level of consciousness: awake    Airway patency: patent  PONV Status: none  Cardiovascular status: acceptable  Respiratory status: acceptable  Hydration status: acceptable

## 2025-06-04 NOTE — OP NOTE
INSERTION VENOUS ACCESS DEVICE  Procedure Report    Patient Name:  Nancie Grissom  YOB: 1945    Date of Surgery:  6/4/2025     Pre-op Diagnosis:   Adenocarcinoma of lung, unspecified laterality [C34.90]    Pre-Op Diagnosis Codes:      * Adenocarcinoma of lung, unspecified laterality [C34.90]       Post-op Diagnosis:   Post-Op Diagnosis Codes:     * Adenocarcinoma of lung, unspecified laterality [C34.90]    Procedure:  Venous access device placement (CPT 14197)    Staff:  Surgeon(s):  Manish Catalan MD    Anesthesia: General    Estimated Blood Loss: Minimal    Complications:  None    Drains:  None    Packing:  None    Implants:    Implant Name Type Inv. Item Serial No.  Lot No. LRB No. Used Action   KT PRT POWERPORT CLEARVUE MOD/BUMP INTERMD 8F 45CM - [id number] Implant KT PRT POWERPORT CLEARVUE MOD/BUMP INTERMD 8F 45CM  [employer] [id number] 1 Implanted     Specimen: None    Indications:  See my preop note.     Findings:  Bard PowerPort ClearVUE Implantable Port placed via right internal jugular vein.    Description of Procedure: Patient was taken to the operating room and placed supine on the operating table.  Timeout verification was performed. MAC anesthesia was administered.  Patient was prepped and draped in usual fashion.  Using ultrasound, the right internal jugular vein was identified and then accessed with a needle.  A guidewire was placed through the needle and the needle was withdrawn.  Fluoroscopy was used to confirm the guidewire was positioned appropriately in the superior vena cava.  A subcutaneous pocket was created at the right upper chest to accommodate the port.  The inner dilator was placed inside of the tear-away sheath and both were placed over the guidewire into the right internal jugular vein.  The inner guidewire and dilator were removed.  The catheter was placed through the tear-away sheath and the sheath was withdrawn.  The tunneler trocar was  used to tunnel the catheter to the subcutaneous pocket.  Then under direct visualization with fluoroscopy, the catheter was pulled back until the tip was positioned appropriately in the superior vena cava.  The catheter was cut to the appropriate length and the locking cap was placed onto the catheter.   The catheter was connected to the port, the locking cap was secured, and the port was placed within the subcutaneous pocket.  I accessed the port with a Rodríguez needle.  I could easily aspirate venous blood.  The port was then flushed with heparinized solution.  The wound was closed appropriately with buried absorbable suture followed by appropriate dressings.  No complications.  Sponge needle and instrument counts were correct.  Patient was transported to the recovery area in stable condition.    Assistant: Noah Angel CSA was responsible for performing the following activities: Retraction, Suturing, Closing and Placing Dressing.  His skilled assistance was necessary for the success of this case.    Manish Catalan MD     Date: 6/4/2025  Time: 16:34 EDT    Electronically signed by Manish Catalan MD, 06/04/25, 4:34 PM EDT.

## 2025-06-04 NOTE — ANESTHESIA PREPROCEDURE EVALUATION
Anesthesia Evaluation     Patient summary reviewed and Nursing notes reviewed   no history of anesthetic complications:   NPO Solid Status: > 8 hours  NPO Liquid Status: > 2 hours           Airway   Mallampati: II  TM distance: >3 FB  Neck ROM: full  No difficulty expected  Dental    (+) poor dentition    Pulmonary - normal exam    breath sounds clear to auscultation  (+) lung cancer,  Cardiovascular   Exercise tolerance: good (4-7 METS)    ECG reviewed  Rhythm: regular  Rate: normal    (+) hypertension, murmur, hyperlipidemia      Neuro/Psych- negative ROS  GI/Hepatic/Renal/Endo    (+) GERD well controlled, renal disease- CRI, thyroid problem hypothyroidism    Musculoskeletal     Abdominal    Substance History - negative use     OB/GYN negative ob/gyn ROS         Other   arthritis,   history of cancer    ROS/Med Hx Other: PAT Nursing Notes unavailable.     EKG 5/6/25- SR              Anesthesia Plan    ASA 3     general     (Patient understands anesthesia not responsible for dental damage.)  intravenous induction     Anesthetic plan, risks, benefits, and alternatives have been provided, discussed and informed consent has been obtained with: patient.    Use of blood products discussed with patient .    Plan discussed with CRNA.    CODE STATUS:

## 2025-06-04 NOTE — DISCHARGE INSTRUCTIONS
Dr. Catalan's Instructions          DIET  Resume your regular diet.     ACTIVITY  Do not lift anything greater than 15 pounds with the arm on the same side the port was placed for one week.  After one week, you have no activity restrictions and may gradually increase your activities using common sense.     WOUND CARE & SHOWERING/BATHING  Your incisions are covered with a plastic type material that will flake off on its own in the next few weeks.  If the plastic type material has not flaked off on its own by 2 weeks after your surgery then use tweezers to pull it off.  You have sutures in your incisions but they are placed below the level of the skin and they will slowly dissolve (they do not need to be removed).  The skin around your incisions will likely have some bruising.  This is normal.  You can shower beginning tomorrow but wait two weeks after your surgery before taking any tub baths.     HOME MEDICATIONS  Resume all of your normal home medications.     PAIN CONTROL  You will receive a narcotic pain medicine prescription before you are discharged home.  Be sure to take the narcotic pain medication with some food so as not to upset your stomach.  Do not drive while you are taking the prescription pain medication.  Take Tylenol or Motrin for mild pain.     BOWEL MOVEMENTS  It is not unusual for narcotic pain medications to cause constipation.  Also, the medications and anesthesia you received for your surgery can have a constipating effect.  To help avoid constipation, drink at least four eight-ounce glasses of water each day and use over-the-counter laxatives/stool softeners (dulcolax, milk of magnesia, senokot, etc.).  I recommend drinking the aforementioned amount of water daily and taking 30 ml of milk of magnesia two times each day while you are using the prescribed narcotic pain medication.  If your bowel movements become too loose or too frequent, then simply stop following these recommendations unless  you start to feel constipated.     FOLLOW-UP VISIT & QUESTIONS/CONCERNS  Call Dr. Catalan´s office at [phone] and schedule a follow-up appointment for about two weeks after your surgery date.  However, if you are doing fine and don´t have any concerns then simply cancel the follow-up appointment.  Should you have any questions or concerns, please call Dr. Xavier office.                   DISCHARGE INSTRUCTIONS  INSERTION OF   PORT-A-CATH      For your surgery you had:  General anesthesia (you may have a sore throat for the first 24 hours)  You received a medicated patch for nausea prevention today (behind your ear). It is recommended that you remove it 24-48 hours post-operatively. It must be removed within 72 hours.     You may experience dizziness, drowsiness, or light-headedness for several hours following surgery/procedure.  Do not stay alone today or tonight.  Limit your activity for 24 hours.  You should not drive, operate machinery, drink alcohol, or sign legally binding documents for 24 hours or while you are taking pain medication.  Resume your diet slowly.  Follow whatever special dietary instructions you may have been given by your doctor.  Last dose of pain medication was given at:   .  NOTIFY YOUR DOCTOR IF YOU EXPERIENCE ANY OF THE FOLLOWING:  Temperature greater than 101 degrees Fahrenheit  Shaking Chills  Redness or excessive drainage from incision  Nausea, vomiting and/opr pain that is not controlled by prescribed medications  Increase in bleeding or bleeding that is excessive  Unable to urinate in 6 hours after surgery  If unable to reach your doctor, please go to the closest Emergency Room        Port should be flushed/heparinized once a month (even when not being used).  Keep Port-A-Cath ID Card in your purse of wallet.  Medications per physician instructions as indicated on Discharge Medication Information Sheet.

## 2025-06-06 ENCOUNTER — DOCUMENTATION (OUTPATIENT)
Dept: RADIATION ONCOLOGY | Facility: HOSPITAL | Age: 80
End: 2025-06-06
Payer: MEDICARE

## 2025-06-06 ENCOUNTER — HOSPITAL ENCOUNTER (OUTPATIENT)
Dept: ONCOLOGY | Facility: HOSPITAL | Age: 80
Discharge: HOME | End: 2025-06-06
Payer: MEDICARE

## 2025-06-06 ENCOUNTER — APPOINTMENT (OUTPATIENT)
Dept: MAMMOGRAPHY | Facility: HOSPITAL | Age: 80
End: 2025-06-06
Payer: MEDICARE

## 2025-06-06 ENCOUNTER — DOCUMENTATION (OUTPATIENT)
Dept: ONCOLOGY | Facility: HOSPITAL | Age: 80
End: 2025-06-06
Payer: MEDICARE

## 2025-06-06 ENCOUNTER — HOSPITAL ENCOUNTER (OUTPATIENT)
Dept: ULTRASOUND IMAGING | Facility: HOSPITAL | Age: 80
End: 2025-06-06
Payer: MEDICARE

## 2025-06-06 VITALS
RESPIRATION RATE: 20 BRPM | SYSTOLIC BLOOD PRESSURE: 109 MMHG | WEIGHT: 132.7 LBS | BODY MASS INDEX: 25.05 KG/M2 | HEIGHT: 61 IN | HEART RATE: 74 BPM | DIASTOLIC BLOOD PRESSURE: 65 MMHG | TEMPERATURE: 98.2 F | OXYGEN SATURATION: 95 %

## 2025-06-06 DIAGNOSIS — Z79.899 ENCOUNTER FOR LONG-TERM (CURRENT) USE OF HIGH-RISK MEDICATION: ICD-10-CM

## 2025-06-06 DIAGNOSIS — Z79.899 ENCOUNTER FOR LONG-TERM (CURRENT) USE OF HIGH-RISK MEDICATION: Primary | ICD-10-CM

## 2025-06-06 DIAGNOSIS — C34.90 ADENOCARCINOMA OF LUNG, UNSPECIFIED LATERALITY: ICD-10-CM

## 2025-06-06 DIAGNOSIS — Z45.2 ENCOUNTER FOR ADJUSTMENT OR MANAGEMENT OF VASCULAR ACCESS DEVICE: Primary | ICD-10-CM

## 2025-06-06 LAB
ALBUMIN SERPL-MCNC: 3.3 G/DL (ref 3.5–5.2)
ALBUMIN/GLOB SERPL: 1.3 G/DL
ALP SERPL-CCNC: 66 U/L (ref 39–117)
ALT SERPL W P-5'-P-CCNC: 8 U/L (ref 1–33)
ANION GAP SERPL CALCULATED.3IONS-SCNC: 13.1 MMOL/L (ref 5–15)
AST SERPL-CCNC: 17 U/L (ref 1–32)
BASOPHILS # BLD AUTO: 0.01 10*3/MM3 (ref 0–0.2)
BASOPHILS NFR BLD AUTO: 0.1 % (ref 0–1.5)
BILIRUB SERPL-MCNC: 0.5 MG/DL (ref 0–1.2)
BUN SERPL-MCNC: 28.5 MG/DL (ref 8–23)
BUN/CREAT SERPL: 31.7 (ref 7–25)
CALCIUM SPEC-SCNC: 8.6 MG/DL (ref 8.6–10.5)
CHLORIDE SERPL-SCNC: 102 MMOL/L (ref 98–107)
CO2 SERPL-SCNC: 20.9 MMOL/L (ref 22–29)
CREAT SERPL-MCNC: 0.9 MG/DL (ref 0.57–1)
DEPRECATED RDW RBC AUTO: 47.5 FL (ref 37–54)
EGFRCR SERPLBLD CKD-EPI 2021: 64.4 ML/MIN/1.73
EOSINOPHIL # BLD AUTO: 0 10*3/MM3 (ref 0–0.4)
EOSINOPHIL NFR BLD AUTO: 0 % (ref 0.3–6.2)
ERYTHROCYTE [DISTWIDTH] IN BLOOD BY AUTOMATED COUNT: 14.5 % (ref 12.3–15.4)
GLOBULIN UR ELPH-MCNC: 2.6 GM/DL
GLUCOSE SERPL-MCNC: 164 MG/DL (ref 65–99)
HCT VFR BLD AUTO: 40.5 % (ref 34–46.6)
HGB BLD-MCNC: 13.5 G/DL (ref 12–15.9)
IMM GRANULOCYTES # BLD AUTO: 0.09 10*3/MM3 (ref 0–0.05)
IMM GRANULOCYTES NFR BLD AUTO: 0.7 % (ref 0–0.5)
LYMPHOCYTES # BLD AUTO: 0.31 10*3/MM3 (ref 0.7–3.1)
LYMPHOCYTES NFR BLD AUTO: 2.4 % (ref 19.6–45.3)
MCH RBC QN AUTO: 30.1 PG (ref 26.6–33)
MCHC RBC AUTO-ENTMCNC: 33.3 G/DL (ref 31.5–35.7)
MCV RBC AUTO: 90.4 FL (ref 79–97)
MONOCYTES # BLD AUTO: 0.29 10*3/MM3 (ref 0.1–0.9)
MONOCYTES NFR BLD AUTO: 2.2 % (ref 5–12)
NEUTROPHILS NFR BLD AUTO: 12.38 10*3/MM3 (ref 1.7–7)
NEUTROPHILS NFR BLD AUTO: 94.6 % (ref 42.7–76)
NRBC BLD AUTO-RTO: 0 /100 WBC (ref 0–0.2)
PLATELET # BLD AUTO: 215 10*3/MM3 (ref 140–450)
PMV BLD AUTO: 10 FL (ref 6–12)
POTASSIUM SERPL-SCNC: 3.8 MMOL/L (ref 3.5–5.2)
PROT SERPL-MCNC: 5.9 G/DL (ref 6–8.5)
RBC # BLD AUTO: 4.48 10*6/MM3 (ref 3.77–5.28)
SODIUM SERPL-SCNC: 136 MMOL/L (ref 136–145)
T4 FREE SERPL-MCNC: 1.24 NG/DL (ref 0.92–1.68)
TSH SERPL DL<=0.05 MIU/L-ACNC: 0.68 UIU/ML (ref 0.27–4.2)
WBC NRBC COR # BLD AUTO: 13.08 10*3/MM3 (ref 3.4–10.8)

## 2025-06-06 PROCEDURE — 80053 COMPREHEN METABOLIC PANEL: CPT | Performed by: INTERNAL MEDICINE

## 2025-06-06 PROCEDURE — 96417 CHEMO IV INFUS EACH ADDL SEQ: CPT

## 2025-06-06 PROCEDURE — 25010000002 PALONOSETRON PER 25 MCG: Performed by: INTERNAL MEDICINE

## 2025-06-06 PROCEDURE — 96375 TX/PRO/DX INJ NEW DRUG ADDON: CPT

## 2025-06-06 PROCEDURE — 84439 ASSAY OF FREE THYROXINE: CPT | Performed by: INTERNAL MEDICINE

## 2025-06-06 PROCEDURE — 96413 CHEMO IV INFUSION 1 HR: CPT

## 2025-06-06 PROCEDURE — 25010000002 PEMETREXED 100 MG RECONSTITUTED SOLUTION 1 EACH VIAL: Performed by: INTERNAL MEDICINE

## 2025-06-06 PROCEDURE — 25010000002 PEMETREXED PER 10 MG: Performed by: INTERNAL MEDICINE

## 2025-06-06 PROCEDURE — 84443 ASSAY THYROID STIM HORMONE: CPT | Performed by: INTERNAL MEDICINE

## 2025-06-06 PROCEDURE — 25010000002 HEPARIN LOCK FLUSH PER 10 UNITS: Performed by: INTERNAL MEDICINE

## 2025-06-06 PROCEDURE — 25810000003 SODIUM CHLORIDE 0.9 % SOLUTION 250 ML FLEX CONT: Performed by: INTERNAL MEDICINE

## 2025-06-06 PROCEDURE — 85025 COMPLETE CBC W/AUTO DIFF WBC: CPT | Performed by: INTERNAL MEDICINE

## 2025-06-06 PROCEDURE — 25810000003 SODIUM CHLORIDE 0.9 % SOLUTION: Performed by: INTERNAL MEDICINE

## 2025-06-06 PROCEDURE — 25010000002 CARBOPLATIN PER 50 MG: Performed by: INTERNAL MEDICINE

## 2025-06-06 PROCEDURE — 25010000002 FOSAPREPITANT PER 1 MG: Performed by: INTERNAL MEDICINE

## 2025-06-06 PROCEDURE — 96411 CHEMO IV PUSH ADDL DRUG: CPT

## 2025-06-06 PROCEDURE — 96367 TX/PROPH/DG ADDL SEQ IV INF: CPT

## 2025-06-06 RX ORDER — HEPARIN SODIUM (PORCINE) LOCK FLUSH IV SOLN 100 UNIT/ML 100 UNIT/ML
500 SOLUTION INTRAVENOUS AS NEEDED
OUTPATIENT
Start: 2025-06-06

## 2025-06-06 RX ORDER — FAMOTIDINE 10 MG/ML
20 INJECTION, SOLUTION INTRAVENOUS AS NEEDED
Status: CANCELLED | OUTPATIENT
Start: 2025-06-09

## 2025-06-06 RX ORDER — DIPHENHYDRAMINE HYDROCHLORIDE 50 MG/ML
50 INJECTION, SOLUTION INTRAMUSCULAR; INTRAVENOUS AS NEEDED
Status: DISCONTINUED | OUTPATIENT
Start: 2025-06-06 | End: 2025-06-07 | Stop reason: HOSPADM

## 2025-06-06 RX ORDER — HEPARIN SODIUM (PORCINE) LOCK FLUSH IV SOLN 100 UNIT/ML 100 UNIT/ML
500 SOLUTION INTRAVENOUS AS NEEDED
Status: DISCONTINUED | OUTPATIENT
Start: 2025-06-06 | End: 2025-06-07 | Stop reason: HOSPADM

## 2025-06-06 RX ORDER — SODIUM CHLORIDE 0.9 % (FLUSH) 0.9 %
20 SYRINGE (ML) INJECTION AS NEEDED
Status: DISCONTINUED | OUTPATIENT
Start: 2025-06-06 | End: 2025-06-07 | Stop reason: HOSPADM

## 2025-06-06 RX ORDER — DIPHENHYDRAMINE HYDROCHLORIDE 50 MG/ML
50 INJECTION, SOLUTION INTRAMUSCULAR; INTRAVENOUS AS NEEDED
Status: CANCELLED | OUTPATIENT
Start: 2025-06-09

## 2025-06-06 RX ORDER — FAMOTIDINE 10 MG/ML
20 INJECTION, SOLUTION INTRAVENOUS AS NEEDED
Status: DISCONTINUED | OUTPATIENT
Start: 2025-06-06 | End: 2025-06-07 | Stop reason: HOSPADM

## 2025-06-06 RX ORDER — PALONOSETRON 0.05 MG/ML
0.25 INJECTION, SOLUTION INTRAVENOUS ONCE
Status: COMPLETED | OUTPATIENT
Start: 2025-06-06 | End: 2025-06-06

## 2025-06-06 RX ORDER — HYDROCORTISONE SODIUM SUCCINATE 100 MG/2ML
100 INJECTION INTRAMUSCULAR; INTRAVENOUS AS NEEDED
Status: CANCELLED | OUTPATIENT
Start: 2025-06-09

## 2025-06-06 RX ORDER — SODIUM CHLORIDE 0.9 % (FLUSH) 0.9 %
20 SYRINGE (ML) INJECTION AS NEEDED
OUTPATIENT
Start: 2025-06-06

## 2025-06-06 RX ORDER — HYDROCORTISONE SODIUM SUCCINATE 100 MG/2ML
100 INJECTION INTRAMUSCULAR; INTRAVENOUS AS NEEDED
Status: DISCONTINUED | OUTPATIENT
Start: 2025-06-06 | End: 2025-06-07 | Stop reason: HOSPADM

## 2025-06-06 RX ORDER — SODIUM CHLORIDE 9 MG/ML
20 INJECTION, SOLUTION INTRAVENOUS ONCE
Status: COMPLETED | OUTPATIENT
Start: 2025-06-06 | End: 2025-06-06

## 2025-06-06 RX ORDER — PALONOSETRON 0.05 MG/ML
0.25 INJECTION, SOLUTION INTRAVENOUS ONCE
Status: CANCELLED | OUTPATIENT
Start: 2025-06-09

## 2025-06-06 RX ORDER — SODIUM CHLORIDE 9 MG/ML
20 INJECTION, SOLUTION INTRAVENOUS ONCE
Status: CANCELLED | OUTPATIENT
Start: 2025-06-09

## 2025-06-06 RX ADMIN — HEPARIN 500 UNITS: 100 SYRINGE at 10:50

## 2025-06-06 RX ADMIN — Medication 20 ML: at 07:39

## 2025-06-06 RX ADMIN — FOSAPREPITANT 150 MG: 150 INJECTION, POWDER, LYOPHILIZED, FOR SOLUTION INTRAVENOUS at 08:39

## 2025-06-06 RX ADMIN — Medication 20 ML: at 10:50

## 2025-06-06 RX ADMIN — CARBOPLATIN 320 MG: 10 INJECTION INTRAVENOUS at 10:19

## 2025-06-06 RX ADMIN — SODIUM CHLORIDE 20 ML/HR: 9 INJECTION, SOLUTION INTRAVENOUS at 08:37

## 2025-06-06 RX ADMIN — PEMETREXED DISODIUM 600 MG: 500 INJECTION, POWDER, LYOPHILIZED, FOR SOLUTION INTRAVENOUS at 09:59

## 2025-06-06 RX ADMIN — SODIUM CHLORIDE 200 MG: 9 INJECTION, SOLUTION INTRAVENOUS at 09:20

## 2025-06-06 RX ADMIN — PALONOSETRON HYDROCHLORIDE 0.25 MG: 0.25 INJECTION, SOLUTION INTRAVENOUS at 08:37

## 2025-06-06 NOTE — PROGRESS NOTES
Distress Screening Follow-Up    Date of Distress Screenin25 and 25    Location of Distress Screening: Radiation oncology and medical oncology    Distress Level: 5 and 8    Problems Indicated: Sleep, fatigue, memory or concentration, changes in eating, loss or change of physical abilities, worry or anxiety, sadness or depression, loss of interest or enjoyment, grief or loss, fear, loneliness, changes in appearance, feelings of worthlessness or being a burden, taking care of myself, taking care of others, death, dying or afterlife, other concerns - cough, sleeping, no appetite, wobbling, spitting up fluids    Diagnosis: Metastatic lung cancer    Current Tx Plan: Mrs. Grissom completed palliative radiation to the brain. She is now receiving chemoimmunotherapy Q 21 days.    Most Recent PHQ -2/PHQ-9 Score: 11 (25)    Most Recent C-SSRS: Denied SI and screened negative (25)    Mental Status: Mrs. Grissom was very pleasant and easily and openly engaged in conversation. Her memory and cognitive skills appeared to be intact, as she was able to provide adequate details to complete her psychosocial needs assessment. She and her sons engaged in humor and laughter this morning. Her PCP is managing her psychotropic medications. She is not interested in receiving any counseling or oncology peer support at this time.    Mental Health Treatment: Mrs. Grissom's PCP is managing her medications for depression and insomnia.    Substance Use/Tobacco Use: Mrs. Grissom has no history of tobacco or illicit drug use. She rarely consumes alcohol.    Support System: Mrs. Grissom is . She receives positive support from her two sons who are both present with her today. Additionally, she has four grandchildren and six great-grandchildren.    Spirituality: Mrs. Grissom has positive spiritual support and [place]. She mentions people that are praying for her. OSW advised that hospital chaplains are available to provide her with additional  spiritual support throughout her treatment and help address her concerns regarding death, dying or afterlife. She did not request a referral at this time.    Hobbies: Mrs. Grissom enjoys spending time with her great-grandchildren and watching television.    Residential status/Who lives in the home: Mrs. Grissom typically resides independently by herself in her home in [place], however, her sons are now helping stay with her to assist with her care.    Home Care Needs: Mrs. Grissom and her sons plan to contact their [facility] rep to see if  can help with the costs of supplies, such as inconvenience supplies. They've purchased her a memory foam cushion to sit on, but forgot to bring this with them today. She was provided with pillows to help keep her comfortable during her treatment this morning.    Insurance: Mr. Grissom has Medicare and [employer] listed on file. She shares she also has  coverage.    Finances: Mrs. Grissom denies any financial concerns at this time. She reports having good insurance coverage and has sufficient income to meet her basic needs.    Transportation: Mrs. Grissom's sons are assisting with her transportation to treatments and deny any concerns at this time, including travel expenses.    Advance Care Planning: No directive on file.    Resources/Referrals: Emotional support, educated on pastoral care     Additional Comment: OSW met with Mrs. Grissom and her two sons in the medical oncology [facility] to follow up in regards to her high distress, introduce OSW role and assess for psychosocial needs. Advised that OSW is available to help navigate the healthcare system, address any barriers to care or unmet physical, emotional, social, practical and spiritual needs. Mrs. Grissom and her sons did not identify any barriers to care or unmet needs at this time. Provided them with my business card, encouraging OSW support remains available in both medical oncology and radiation oncology. They expressed  appreciation.

## 2025-06-06 NOTE — PROGRESS NOTES
"Presents for C1D1 of carboplatin, pemetrexed, pembrolizumab     Patient was educated on information about each medication including dose, route, frequency, and common adverse effects. Patient was provided both verbal and written counseling. UpToDate Lexidrug adult patient education printed and provided to patient and key information verbally highlighted including: Overview of regimen including but not limited to infusion times; recognition and management of allergic/infusion reactions; \"call your doctor right away\" parameters.     All of the patient's questions were answered and patient expressed verbal understanding    "

## 2025-06-06 NOTE — NURSING NOTE
Chemotherapy therapy regimen discussed: Keytuda, Alimta, and Carboplatin    Consent signed: yes see chart    Oriented to facility: Orientated to lobby, registration, nourishment area, restrooms, treatment area.    Teaching: Reviewed typical treatment day process and visitor policy. Discussed importance of continuing previously prescribed medications unless otherwise directed by Dr Rios. Pt informed of clinic resources and contact information. Chemotherapy regimen and side effects discussed.    Materials Given: Written materials provided from pharmacy    Symptom management medications and Pharmacy Verified:  All symptom management medications have been reviewed and are at pharmacy. Pt aware.     Upcoming Appointments Reviewed: AVS provided      Pt and family v/u of all education and information provided and deny further questions or concerns. RN advised pt/family to call as needed for any questions or concerns that may arise in the future. Pt and family v/u.

## 2025-06-06 NOTE — PROGRESS NOTES
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  She is receiving radiation therapy to the left thalamus; posterior horn of left ventricle. She  has received 3000 cGy in 5 fractions out of a planned dose of 3000 cGy in 5 fractions.     Today on exam the patient is tolerating radiation therapy well and has no new disease or treatment-related complaints.                                           Review of Systems:   Review of Systems    Vitals:     Vitals:    06/03/25 1356   BP: (!) 89/71   Pulse: 99   Resp: (!) 30   Temp: 98.1 °F (36.7 °C)   SpO2: 91%       Weight:   Wt Readings from Last 3 Encounters:   06/06/25 60.2 kg (132 lb 11.2 oz)   06/04/25 59.4 kg (130 lb 15.3 oz)   06/02/25 59.5 kg (131 lb 2.8 oz)      Pain:    Pain Score    06/03/25 1356   PainSc: 0-No pain         Physical Exam:  Gen: WD/WN; NAD  HEENT: MMM  Trachea: midline  Chest: symmetric  Resp: normal respiratory effort  Extr: warm, well-perfused  Neuro: awake and alert; no aphasia or neglect    Plan: I have reviewed treatment setup notes, checked and approved the daily guidance images.  I reviewed dose delivery, treatment parameters and deemed them appropriate.     MRI brain in 1 month      Radiation Oncology    Electronically signed by Dl Segundo MD 6/3/2025  14:41 EDT

## 2025-06-07 RX ORDER — MIRTAZAPINE 15 MG/1
15 TABLET, FILM COATED ORAL NIGHTLY
Status: CANCELLED | OUTPATIENT
Start: 2025-06-07

## 2025-06-11 ENCOUNTER — TELEPHONE (OUTPATIENT)
Dept: ONCOLOGY | Facility: HOSPITAL | Age: 80
End: 2025-06-11
Payer: MEDICARE

## 2025-06-11 LAB
MYCOBACTERIUM SPEC CULT: NORMAL
MYCOBACTERIUM SPEC CULT: NORMAL
NIGHT BLUE STAIN TISS: NORMAL

## 2025-06-11 RX ORDER — BENZONATATE 100 MG/1
100 CAPSULE ORAL 3 TIMES DAILY PRN
Qty: 50 CAPSULE | Refills: 1 | Status: CANCELLED | OUTPATIENT
Start: 2025-06-11

## 2025-06-11 NOTE — TELEPHONE ENCOUNTER
Patients son stated patient also needs medication refill for benzonatate 100mg, and mirtazapine 15mg, I explain to patient that medication he will need to contact Dr Umanzor office since they are the ones prescribing it, pt son stated he rather have medications to come from the same office, I try to explain to him that if its coming from another provider usually pcp would recommend for patient to continue getting medication from same provider, please advise

## 2025-06-11 NOTE — TELEPHONE ENCOUNTER
Son of pt, Ronen Grissom, came into office stating pt has started chemotherapy and was told pt is no longer able to take the following medications and would like them removed.     lisinopril (PRINIVIL,ZESTRIL) 10 MG tablet     hydroCHLOROthiazide 25 MG tablet

## 2025-06-11 NOTE — TELEPHONE ENCOUNTER
"Patient's son Ronen called in regarding patient--reports his mom is not wanting to eat much of anything anymore, patient is stating that \"everything tastes bad\", Ronen states that patient is getting in fluids, and today she got in yogurt, 2 pieces of toast, and a pickle; asked Ronen about protein shakes and patient will only drink them if they are watered down; Ronen states that patient's appetite was declining even before starting treatment (1st chemotherapy was on 6/6) but the family feels it is getting worse; questioned Ronen about other signs/symptoms and Ronen said patient is awake, alert, and \"sharp\"--some weakness with mobility but that is not new per Ronen; patient is having incontinence, family member in background of phone call states that patient knows when she has to go to the bathroom but \"unsure if she has to urinate or have a bowel movement\"; Ronen wanted to know if patient needs to be brought to ER at this time, this RN told Ronen that taste changes are unfortunately one of the main side effects of chemotherapy, discussed with Ronen that there are appetite stimulants that can be prescribed sometimes to help with this and mentioned a dietician referral can be made; told Ronen that I would notify Dr Rios of signs/symptoms and see if patient can be prescribed appetite stimulant/dietician referral but because patient is drinking plenty of fluids, getting some caloric intake, is urinating/having bowel movements, and is alert and awake that ER at this moment is not necessary; however, if anything changes overnight with patient they can take pt to ER for evaluation or call our after hour line since clinic is closed until 0800 tomorrow; told Ronen we would call back with Dr Rios's recommendations tomorrow; please advise  "

## 2025-06-12 ENCOUNTER — DOCUMENTATION (OUTPATIENT)
Dept: ONCOLOGY | Facility: HOSPITAL | Age: 80
End: 2025-06-12
Payer: MEDICARE

## 2025-06-12 ENCOUNTER — PATIENT MESSAGE (OUTPATIENT)
Dept: ONCOLOGY | Facility: HOSPITAL | Age: 80
End: 2025-06-12
Payer: MEDICARE

## 2025-06-12 DIAGNOSIS — R63.0 DECREASED APPETITE: ICD-10-CM

## 2025-06-12 DIAGNOSIS — G47.01 INSOMNIA DUE TO MEDICAL CONDITION: ICD-10-CM

## 2025-06-12 DIAGNOSIS — C34.90 ADENOCARCINOMA OF LUNG, UNSPECIFIED LATERALITY: ICD-10-CM

## 2025-06-12 DIAGNOSIS — R05.3 CHRONIC COUGH: ICD-10-CM

## 2025-06-12 DIAGNOSIS — D64.9 ANEMIA, UNSPECIFIED TYPE: ICD-10-CM

## 2025-06-12 DIAGNOSIS — R53.83 OTHER FATIGUE: ICD-10-CM

## 2025-06-12 DIAGNOSIS — C34.90 ADENOCARCINOMA OF LUNG, UNSPECIFIED LATERALITY: Primary | ICD-10-CM

## 2025-06-12 DIAGNOSIS — C79.31 METASTASIS TO BRAIN: ICD-10-CM

## 2025-06-12 RX ORDER — MIRTAZAPINE 7.5 MG/1
7.5 TABLET, FILM COATED ORAL NIGHTLY
Qty: 30 TABLET | Refills: 0 | Status: SHIPPED | OUTPATIENT
Start: 2025-06-12

## 2025-06-12 RX ORDER — BENZONATATE 100 MG/1
100 CAPSULE ORAL 3 TIMES DAILY PRN
Qty: 50 CAPSULE | Refills: 1 | Status: SHIPPED | OUTPATIENT
Start: 2025-06-12

## 2025-06-12 NOTE — TELEPHONE ENCOUNTER
Spoke with Ronen again today, reports that patient did well overnight, she ate more for breakfast this morning; discussed Dr Rios's recommendations of appointment with NP for labs and assessment--Ronen reports that patient's brother is coming in from [place] to spend time with patient so does not want patient to come in for NP visit tomorrow or Monday but is agreeable sometime next week; reinforced that if patient were to decline that she would need to go to ER for evaluation, Ronen verbalized understanding; labs orders and refill requests sent to Dr Rios to sign    Nela please schedule lab and appt with Maricruz next week

## 2025-06-12 NOTE — PROGRESS NOTES
Discussed benefits of home health care with patient's son, Jonas. He will discuss this with his brother and mom and contact OSW if they wish to proceed with a referral to home health. Encouraged OSW support remains available.

## 2025-06-13 ENCOUNTER — TELEPHONE (OUTPATIENT)
Dept: ONCOLOGY | Facility: HOSPITAL | Age: 80
End: 2025-06-13
Payer: MEDICARE

## 2025-06-13 ENCOUNTER — PATIENT OUTREACH (OUTPATIENT)
Dept: CASE MANAGEMENT | Facility: OTHER | Age: 80
End: 2025-06-13
Payer: MEDICARE

## 2025-06-13 ENCOUNTER — TELEPHONE (OUTPATIENT)
Dept: CASE MANAGEMENT | Facility: OTHER | Age: 80
End: 2025-06-13
Payer: MEDICARE

## 2025-06-13 DIAGNOSIS — C80.1 CANCER: Primary | ICD-10-CM

## 2025-06-13 DIAGNOSIS — C34.90 ADENOCARCINOMA OF LUNG, UNSPECIFIED LATERALITY: Primary | ICD-10-CM

## 2025-06-13 NOTE — TELEPHONE ENCOUNTER
LC---I spoke with the patient's DIL (Rayne) today to follow up on patient and see if there is anything I can assist with. I was told that the patient's son(POA now) does not want any assistance from others at this time. Due to this information, I am closing her program. She does have a future appt scheduled with you.    Thank you,  Emily

## 2025-06-13 NOTE — OUTREACH NOTE
AMBULATORY CASE MANAGEMENT NOTE    Names and Relationships of Patient/Support Persons: Contact: Rayne Grissom; Relationship: Emergency Contact -     [facility] Interim Update    I spoke with the patient's DIL today to follow up on patient and see if there is anything I can assist with. I was told that the patient's son(POA now) does not want any assistance from others at this time. So I am closing her program. She does have a future  appt scheduled with PCP.    I communicated this information with the PCP.    Emily CASTANON  Ambulatory Case Management    6/13/2025, 11:18 EDT

## 2025-06-13 NOTE — TELEPHONE ENCOUNTER
OSW received a phone call from Mrs. Grissom's son, Ronen, to inquire more about home health care. OSW provided Ronen with education regarding the benefits of home health care. They're agreeable to a home health referral as long as they will not accrue additional bills from this. Confirmed this will be covered through her health insurances. OSW requested the nurse clinician and oncologist to please place an order for home health and OSW will coordinate with [email or web address]. Lastly, Ronen inquired if [employer] might pay for some of Mrs. Grissom's supplies, such as Depends, sani wipes, tissues, etc. OSW will contact [facility] to inquire, however, advised that sometimes home health can assist with these items as well. OSW support remains available.    OSW confirmed unfortunately [employer] does not provide coverage for expendable items, such as incontinent pads, diapers, ace bandages, etc. OSW consulted with home health to see they can assist with any of these supplies and received confirmation that they can. They will be reaching out to Mrs. Grissom's son, Ronen, shortly to get services scheduled and set up with supplies needed. OSW contacted Ronen back to make him aware. OSW support remains available.    Resources/Referrals Provided: [email or web address] and incontinence supplies

## 2025-06-16 LAB
DNA RANGE(S) EXAMINED NAR: NORMAL
GENE DIS ANL INTERP-IMP: POSITIVE
GENE DIS ASSESSED: NORMAL
GENE MUT TESTED BLD/T: 3.7 M/MB
PD-L1 BY 22C3 TISS IMSTN DOC: NORMAL
PD-L1 BY 28-8 TISS IMSTN DOC: POSITIVE
PD-L1 BY SP142 TISS IMSTN DOC: NEGATIVE
PD-L1 BY SP142 TISS QL IMSTN: <1 %
PD-L1 BY SP263 TISS DOC: POSITIVE
REASON FOR STUDY: NORMAL
TEMPUS GERMLINE NOTE: NORMAL
TEMPUS LCA: NORMAL
TEMPUS MSI NOTE: NORMAL
TEMPUS PD-L1 (22C3) COMBINED POSITIVE SCORE: 65
TEMPUS PD-L1 (22C3) TUMOR PROPORTION SCORE: 65 %
TEMPUS PD-L1 (28-8) CELLS.PD-L1/100 VIAB TUM NFR TISS IMSTN: 80 %
TEMPUS PD-L1 (SP142) CELLS.PD-L1/100 VIAB TUM NFR TISS IMSTN: 25 %
TEMPUS PD-L1 (SP263) CELLS. PD-L1/100 VIAB TUM NFR TISS IMSTN: 90 %
TEMPUS PERTINENTNEGATIVES: NORMAL
TEMPUS PORTAL: NORMAL
TEMPUS THERAPY1: NORMAL
TEMPUS THERAPY2: NORMAL
TEMPUS THERAPY3: NORMAL
TEMPUS THERAPYCOUNT: 3
TEMPUS XR RESULT 1: NORMAL

## 2025-06-18 ENCOUNTER — HOSPITAL ENCOUNTER (OUTPATIENT)
Dept: ONCOLOGY | Facility: HOSPITAL | Age: 80
Discharge: HOME | End: 2025-06-18
Payer: MEDICARE

## 2025-06-18 ENCOUNTER — OFFICE VISIT (OUTPATIENT)
Dept: ONCOLOGY | Facility: HOSPITAL | Age: 80
End: 2025-06-18
Payer: MEDICARE

## 2025-06-18 VITALS
RESPIRATION RATE: 18 BRPM | BODY MASS INDEX: 23.93 KG/M2 | HEART RATE: 95 BPM | DIASTOLIC BLOOD PRESSURE: 80 MMHG | OXYGEN SATURATION: 98 % | SYSTOLIC BLOOD PRESSURE: 104 MMHG | HEIGHT: 61 IN | TEMPERATURE: 97.9 F | WEIGHT: 126.76 LBS

## 2025-06-18 DIAGNOSIS — R53.83 OTHER FATIGUE: ICD-10-CM

## 2025-06-18 DIAGNOSIS — N63.21 MASS OF UPPER OUTER QUADRANT OF LEFT BREAST: Primary | ICD-10-CM

## 2025-06-18 DIAGNOSIS — D64.9 ANEMIA, UNSPECIFIED TYPE: ICD-10-CM

## 2025-06-18 DIAGNOSIS — C79.31 METASTASIS TO BRAIN: ICD-10-CM

## 2025-06-18 DIAGNOSIS — C34.90 ADENOCARCINOMA OF LUNG, UNSPECIFIED LATERALITY: ICD-10-CM

## 2025-06-18 DIAGNOSIS — Z45.2 ENCOUNTER FOR ADJUSTMENT OR MANAGEMENT OF VASCULAR ACCESS DEVICE: Primary | ICD-10-CM

## 2025-06-18 DIAGNOSIS — C34.90 ADENOCARCINOMA OF LUNG, UNSPECIFIED LATERALITY: Primary | ICD-10-CM

## 2025-06-18 DIAGNOSIS — E87.6 HYPOKALEMIA: ICD-10-CM

## 2025-06-18 DIAGNOSIS — R63.0 DECREASED APPETITE: ICD-10-CM

## 2025-06-18 LAB
ALBUMIN SERPL-MCNC: 2.9 G/DL (ref 3.5–5.2)
ALBUMIN/GLOB SERPL: 0.9 G/DL
ALP SERPL-CCNC: 69 U/L (ref 39–117)
ALT SERPL W P-5'-P-CCNC: 12 U/L (ref 1–33)
ANION GAP SERPL CALCULATED.3IONS-SCNC: 15.6 MMOL/L (ref 5–15)
AST SERPL-CCNC: 20 U/L (ref 1–32)
BASOPHILS # BLD AUTO: 0.01 10*3/MM3 (ref 0–0.2)
BASOPHILS NFR BLD AUTO: 0.2 % (ref 0–1.5)
BILIRUB SERPL-MCNC: 0.4 MG/DL (ref 0–1.2)
BUN SERPL-MCNC: 13.9 MG/DL (ref 8–23)
BUN/CREAT SERPL: 19 (ref 7–25)
CALCIUM SPEC-SCNC: 8.4 MG/DL (ref 8.6–10.5)
CHLORIDE SERPL-SCNC: 98 MMOL/L (ref 98–107)
CO2 SERPL-SCNC: 20.4 MMOL/L (ref 22–29)
CREAT SERPL-MCNC: 0.73 MG/DL (ref 0.57–1)
DEPRECATED RDW RBC AUTO: 45.2 FL (ref 37–54)
EGFRCR SERPLBLD CKD-EPI 2021: 82.7 ML/MIN/1.73
EOSINOPHIL # BLD AUTO: 0.04 10*3/MM3 (ref 0–0.4)
EOSINOPHIL NFR BLD AUTO: 0.8 % (ref 0.3–6.2)
ERYTHROCYTE [DISTWIDTH] IN BLOOD BY AUTOMATED COUNT: 14.1 % (ref 12.3–15.4)
FERRITIN SERPL-MCNC: 559.4 NG/ML (ref 13–150)
GLOBULIN UR ELPH-MCNC: 3.1 GM/DL
GLUCOSE SERPL-MCNC: 95 MG/DL (ref 65–99)
HCT VFR BLD AUTO: 37.2 % (ref 34–46.6)
HGB BLD-MCNC: 12.6 G/DL (ref 12–15.9)
IMM GRANULOCYTES # BLD AUTO: 0.03 10*3/MM3 (ref 0–0.05)
IMM GRANULOCYTES NFR BLD AUTO: 0.6 % (ref 0–0.5)
IRON 24H UR-MRATE: 37 MCG/DL (ref 37–145)
IRON SATN MFR SERPL: 18 % (ref 20–50)
LYMPHOCYTES # BLD AUTO: 0.42 10*3/MM3 (ref 0.7–3.1)
LYMPHOCYTES NFR BLD AUTO: 7.9 % (ref 19.6–45.3)
MAGNESIUM SERPL-MCNC: 1.9 MG/DL (ref 1.6–2.4)
MCH RBC QN AUTO: 30.1 PG (ref 26.6–33)
MCHC RBC AUTO-ENTMCNC: 33.9 G/DL (ref 31.5–35.7)
MCV RBC AUTO: 88.8 FL (ref 79–97)
MONOCYTES # BLD AUTO: 0.32 10*3/MM3 (ref 0.1–0.9)
MONOCYTES NFR BLD AUTO: 6 % (ref 5–12)
MYCOBACTERIUM SPEC CULT: NORMAL
MYCOBACTERIUM SPEC CULT: NORMAL
NEUTROPHILS NFR BLD AUTO: 4.47 10*3/MM3 (ref 1.7–7)
NEUTROPHILS NFR BLD AUTO: 84.5 % (ref 42.7–76)
NIGHT BLUE STAIN TISS: NORMAL
NRBC BLD AUTO-RTO: 0 /100 WBC (ref 0–0.2)
PHOSPHATE SERPL-MCNC: 2.6 MG/DL (ref 2.5–4.5)
PLATELET # BLD AUTO: 126 10*3/MM3 (ref 140–450)
PMV BLD AUTO: 10.2 FL (ref 6–12)
POTASSIUM SERPL-SCNC: 3.4 MMOL/L (ref 3.5–5.2)
PROT SERPL-MCNC: 6 G/DL (ref 6–8.5)
RBC # BLD AUTO: 4.19 10*6/MM3 (ref 3.77–5.28)
SODIUM SERPL-SCNC: 134 MMOL/L (ref 136–145)
TIBC SERPL-MCNC: 201 MCG/DL (ref 298–536)
TRANSFERRIN SERPL-MCNC: 135 MG/DL (ref 200–360)
WBC NRBC COR # BLD AUTO: 5.29 10*3/MM3 (ref 3.4–10.8)

## 2025-06-18 PROCEDURE — 84100 ASSAY OF PHOSPHORUS: CPT | Performed by: INTERNAL MEDICINE

## 2025-06-18 PROCEDURE — 80053 COMPREHEN METABOLIC PANEL: CPT | Performed by: INTERNAL MEDICINE

## 2025-06-18 PROCEDURE — 85025 COMPLETE CBC W/AUTO DIFF WBC: CPT | Performed by: INTERNAL MEDICINE

## 2025-06-18 PROCEDURE — 25010000002 HEPARIN LOCK FLUSH PER 10 UNITS: Performed by: INTERNAL MEDICINE

## 2025-06-18 PROCEDURE — 96360 HYDRATION IV INFUSION INIT: CPT

## 2025-06-18 PROCEDURE — 82728 ASSAY OF FERRITIN: CPT | Performed by: INTERNAL MEDICINE

## 2025-06-18 PROCEDURE — 83735 ASSAY OF MAGNESIUM: CPT | Performed by: INTERNAL MEDICINE

## 2025-06-18 PROCEDURE — 83540 ASSAY OF IRON: CPT | Performed by: INTERNAL MEDICINE

## 2025-06-18 PROCEDURE — 25810000003 SODIUM CHLORIDE 0.9 % SOLUTION: Performed by: NURSE PRACTITIONER

## 2025-06-18 PROCEDURE — 36591 DRAW BLOOD OFF VENOUS DEVICE: CPT

## 2025-06-18 PROCEDURE — 84466 ASSAY OF TRANSFERRIN: CPT | Performed by: INTERNAL MEDICINE

## 2025-06-18 RX ORDER — HEPARIN SODIUM (PORCINE) LOCK FLUSH IV SOLN 100 UNIT/ML 100 UNIT/ML
500 SOLUTION INTRAVENOUS AS NEEDED
Status: DISCONTINUED | OUTPATIENT
Start: 2025-06-18 | End: 2025-06-19 | Stop reason: HOSPADM

## 2025-06-18 RX ORDER — SODIUM CHLORIDE 0.9 % (FLUSH) 0.9 %
20 SYRINGE (ML) INJECTION AS NEEDED
Status: DISCONTINUED | OUTPATIENT
Start: 2025-06-18 | End: 2025-06-19 | Stop reason: HOSPADM

## 2025-06-18 RX ORDER — HEPARIN SODIUM (PORCINE) LOCK FLUSH IV SOLN 100 UNIT/ML 100 UNIT/ML
500 SOLUTION INTRAVENOUS AS NEEDED
OUTPATIENT
Start: 2025-06-18

## 2025-06-18 RX ORDER — POTASSIUM CHLORIDE 750 MG/1
20 CAPSULE, EXTENDED RELEASE ORAL 2 TIMES DAILY
Qty: 120 CAPSULE | Refills: 0 | Status: SHIPPED | OUTPATIENT
Start: 2025-06-18

## 2025-06-18 RX ORDER — SODIUM CHLORIDE 0.9 % (FLUSH) 0.9 %
20 SYRINGE (ML) INJECTION AS NEEDED
OUTPATIENT
Start: 2025-06-18

## 2025-06-18 RX ADMIN — HEPARIN 500 UNITS: 100 SYRINGE at 15:29

## 2025-06-18 RX ADMIN — Medication 20 ML: at 15:29

## 2025-06-18 RX ADMIN — SODIUM CHLORIDE 1000 ML: 9 INJECTION, SOLUTION INTRAVENOUS at 14:30

## 2025-06-18 RX ADMIN — Medication 20 ML: at 13:13

## 2025-06-18 NOTE — PROGRESS NOTES
Chief Complaint/Reason for Referral:  Adenocarcinoma of lung, unspecified laterality    Wen Leblanc MD Archemetre, Rose, MD    Records Obtained:  Records of the patients history including those obtained from [facility] and patient information were reviewed and summarized in detail.    Subjective        History of Present Illness              Results                 Cancer Staging   No matching staging information was found for the patient.       Treatment intent: {curative/palliative:67586}    Oncology/Hematology History   Adenocarcinoma of lung   5/28/2025 Initial Diagnosis    Adenocarcinoma of lung     6/6/2025 -  Chemotherapy    OP LUNG Pembrolizumab 200 mg / Pemetrexed / Carboplatin AUC=5     6/18/2025 Biopsy    OP LUNG Pembrolizumab 200 mg  Plan Provider: Cristi Rios MD  Treatment goal: Control  Line of treatment: [No plan line of treatment]     6/18/2025 -  Chemotherapy    OP SUPPORTIVE HYDRATION + ANTIEMETICS         Review of Systems   Constitutional:  Positive for appetite change.   Gastrointestinal:  Positive for rectal pain.   Skin:  Positive for rash.        Pt has developed rash on stomach       Current Outpatient Medications on File Prior to Visit   Medication Sig Dispense Refill    albuterol sulfate  (90 Base) MCG/ACT inhaler Inhale 2 puffs Every 4 (Four) Hours As Needed for Wheezing. 18 g 0    amitriptyline (ELAVIL) 10 MG tablet Take 1 tablet by mouth Every Night. 90 tablet 1    atorvastatin (LIPITOR) 20 MG tablet Take 1 tablet by mouth Every Night. 90 tablet 1    benzonatate (Tessalon Perles) 100 MG capsule Take 1 capsule by mouth 3 (Three) Times a Day As Needed for Cough. 50 capsule 1    dexAMETHasone (DECADRON) 4 MG tablet Take 1 tablet by mouth Daily With Breakfast. 1 tablet twice daily for 3 days, day prior to chemo, day of chemo, day after chemo 24 tablet 0    Diclofenac Sodium (VOLTAREN) 1 % gel gel Apply  topically to the appropriate area as directed 4 (Four) Times a Day. 150 g 3     folic acid (FOLVITE) 1 MG tablet Take 1 tablet by mouth Daily. 90 tablet 1    hydroCHLOROthiazide 25 MG tablet Take 1 tablet by mouth Daily. 90 tablet 1    HYDROcodone-acetaminophen (NORCO) 5-325 MG per tablet Take 1 tablet by mouth Every 4 (Four) Hours As Needed for Moderate Pain or Severe Pain. 5 tablet 0    ipratropium-albuterol (DUO-NEB) 0.5-2.5 mg/3 ml nebulizer Take 3 mL by nebulization 4 (Four) Times a Day As Needed for Wheezing or Shortness of Air. 360 mL 1    levothyroxine (Synthroid) 50 MCG tablet Take 1 tablet by mouth Daily. 90 tablet 1    lidocaine-prilocaine (EMLA) 2.5-2.5 % cream Apply 1 Application topically to the appropriate area as directed Daily. Put on port site prior to chemotherapy appointments. 30 g 1    lisinopril (PRINIVIL,ZESTRIL) 10 MG tablet Take 1 tablet by mouth Daily. 90 tablet 1    loperamide (IMODIUM) 2 MG capsule Take 1 capsule by mouth 4 (Four) Times a Day As Needed for Diarrhea. 2 tablets at onset of diarrhea, one tablet after each loose BM up to 8 in 24 hours 30 capsule 0    mirtazapine (REMERON) 7.5 MG tablet Take 1 tablet by mouth Every Night. 30 tablet 0    OLANZapine (zyPREXA) 5 MG tablet Take 1 tablet by mouth Every Night. Take one tablet nightly for 4 nights starting on day of chemotherapy. 16 tablet 0    ondansetron (ZOFRAN) 8 MG tablet Take 1 tablet by mouth Every 8 (Eight) Hours As Needed for Nausea or Vomiting. 30 tablet 2    pantoprazole (Protonix) 40 MG EC tablet Take 1 tablet by mouth Daily. (Patient taking differently: Take 1 tablet by mouth Every Night.) 90 tablet 1    traZODone (DESYREL) 50 MG tablet Take 1 tablet by mouth Every Night. 90 tablet 1     No current facility-administered medications on file prior to visit.       Allergies   Allergen Reactions    Methotrexate Other (See Comments)     Pt caused hair loss      Past Medical History:   Diagnosis Date    Arthritis     C. difficile colitis     Depression     Endometriosis     GERD (gastroesophageal reflux  disease) 02/12/2015    History of radiation therapy     completed on brain    Hyperlipidemia     Hypertension     controlled now    Hypothyroidism     Lung cancer     mets to brain, adrenal, rib    Osteoporosis     Vitamin D deficiency      Past Surgical History:   Procedure Laterality Date    BRONCHOSCOPY N/A 05/07/2025    Procedure: BRONCHOSCOPY WITH BRONCHOALVEOLAR LAVAGE, POSSIBLE BIOPSY, BRUSHING, WASHING, AIRWAY INSPECTION: insertion of lighted instrument to view inside the lung;  Surgeon: Raleigh Tompkins MD;  Location: [facility] MAIN OR;  Service: Pulmonary;  Laterality: N/A;    ROTATOR CUFF REPAIR Right     VENOUS ACCESS DEVICE (PORT) INSERTION Right 6/4/2025    Procedure: INSERTION VENOUS ACCESS DEVICE;  Surgeon: Manish Catalan MD;  Location: [facility] MAIN OR;  Service: General;  Laterality: Right;     Social History     Socioeconomic History    Marital status:    Tobacco Use    Smoking status: Never    Smokeless tobacco: Never   Vaping Use    Vaping status: Never Used   Substance and Sexual Activity    Alcohol use: Yes     Comment: rarely    Drug use: Never    Sexual activity: Defer     Family History   Problem Relation Age of Onset    Cancer Father     Asthma Father     Malig Hyperthermia Neg Hx      Immunization History   Administered Date(s) Administered    COVID-19 ([employer]) 04/05/2021    Flu Vaccine Quad PF >36MO 02/09/2018    Fluzone High-Dose 65+YRS 01/02/2025    Fluzone High-Dose 65+yrs 10/26/2022, 10/17/2023    Influenza, Unspecified 10/21/2019    Pneumococcal Conjugate 20-Valent (PCV20) 10/17/2023    Pneumococcal Polysaccharide (PPSV23) 08/12/2014       Tobacco Use: Low Risk  (6/18/2025)    Patient History     Smoking Tobacco Use: Never     Smokeless Tobacco Use: Never     Passive Exposure: Not on file       Objective     Physical Exam    Vitals:    06/18/25 1342   BP: 104/80   Pulse: 95   Resp: 18   Temp: 97.9 °F (36.6 °C)   TempSrc: Temporal   SpO2: 98%   Weight: 57.5 kg (126 lb 12.2 oz)  "  Height: 156 cm (61.42\")   PainSc: 0-No pain       Wt Readings from Last 3 Encounters:   06/18/25 57.5 kg (126 lb 12.2 oz)   06/06/25 60.2 kg (132 lb 11.2 oz)   06/04/25 59.4 kg (130 lb 15.3 oz)        BMI is within normal parameters. No other follow-up for BMI required.       ECOG score: 2         ECOG: {findings; ecog performance status:73358}  Fall Risk Assessment was completed, and patient is at {LOW/MODERATE/HIGH:94177} risk for falls.  PHQ-9 Total Score: 0       The patient {is/is not:86055}  experiencing fatigue. Fatigue score: {0-10:32888}    PT/OT Functional Screening: {PT fx screen (Optional):72710}  Speech Functional Screening: {Speech fx screen (Optional):90364}  Rehab to be ordered: {Rehab to be ordered (Optional):86513}        Result Review :  The following data was reviewed by: LARRY Sneed on 06/18/2025:  Lab Results   Component Value Date    HGB 12.6 06/18/2025    HCT 37.2 06/18/2025    MCV 88.8 06/18/2025     (L) 06/18/2025    WBC 5.29 06/18/2025    NEUTROABS 4.47 06/18/2025    LYMPHSABS 0.42 (L) 06/18/2025    MONOSABS 0.32 06/18/2025    EOSABS 0.04 06/18/2025    BASOSABS 0.01 06/18/2025     Lab Results   Component Value Date    GLUCOSE 95 06/18/2025    BUN 13.9 06/18/2025    CREATININE 0.73 06/18/2025     (L) 06/18/2025    K 3.4 (L) 06/18/2025    CL 98 06/18/2025    CO2 20.4 (L) 06/18/2025    CALCIUM 8.4 (L) 06/18/2025    PROTEINTOT 6.0 06/18/2025    ALBUMIN 2.9 (L) 06/18/2025    BILITOT 0.4 06/18/2025    ALKPHOS 69 06/18/2025    AST 20 06/18/2025    ALT 12 06/18/2025     Lab Results   Component Value Date    Ferritin 559.40 (H) 06/18/2025     Lab Results   Component Value Date    IRON 37 06/18/2025    LABIRON 18 (L) 06/18/2025    TRANSFERRIN 135 (L) 06/18/2025    TIBC 201 (L) 06/18/2025     Lab Results   Component Value Date    FERRITIN 559.40 (H) 06/18/2025     No results found for: \"PSA\", \"CEA\", \"AFP\", \"\", \"\"         Assessment and Plan:  Diagnoses and all " orders for this visit:    1. Mass of upper outer quadrant of left breast (Primary)  -     Mammo Diagnostic Digital Tomosynthesis Left With CAD; Future    2. Adenocarcinoma of lung, unspecified laterality  -     Cancel: ONCBCN INFUSION APPOINTMENT REQUEST 01; Future  -     ONCBCN INFUSION APPOINTMENT REQUEST 01; Future    3. Hypokalemia  -     potassium chloride (MICRO-K) 10 MEQ CR capsule; Take 2 capsules by mouth 2 (Two) Times a Day.  Dispense: 120 capsule; Refill: 0    Other orders  -     Cancel: sodium chloride 0.9 % bolus 1,000 mL  -     sodium chloride 0.9 % bolus 1,000 mL        [unfilled]                  {Time Spent (Optional):15152}    Patient Follow Up: ***  Patient was given instructions and counseling regarding her condition or for health maintenance advice. Please see specific information pulled into the AVS if appropriate.     Breanne Ca, APRN    6/18/2025    .tob    {[facility] [employer] Provider Statement:58875}

## 2025-06-18 NOTE — PROGRESS NOTES
Chief Complaint/Reason for Referral:  Adenocarcinoma of lung, unspecified laterality    Wen Leblanc MD Archemetre, Rose, MD    Records Obtained:  Records of the patients history including those obtained from [facility] and patient information were reviewed and summarized in detail.    Subjective    History of Present Illness  The patient is an 79-year-old female who presents for a follow-up for lung cancer. She completed her first cycle of chemotherapy on 06/06/2025. Her current treatment regimen includes carboplatin, pemetrexed, and pembrolizumab administered intravenously every three weeks. She reports feeling very weak and is accompanied by her sons.    She is experiencing significant fatigue, loss of appetite, and difficulty eating. Additionally, she reports weakness on her right side, which has been progressively worsening over the past two months. There are no headaches or changes in vision. She is not experiencing diarrhea, fever, nausea, or vomiting. Her fluid intake is less than desired.     She has been prescribed trazodone for sleep and appetite regulation but has difficulty swallowing the medication. She is also on mirtazapine, which was recently refilled, and olanzapine, which she takes at night. She has been on trazodone and amitriptyline for an extended period. She has expressed a preference to continue only with Keytruda and discontinue the other two  chemotherapy medications.    A rash has developed arount the abdomen, which she was informed could be a side effect of Keytruda. The rash is itchy and does not resembles shingles. She has been applying Benadryl anti-itch cream, which provides some relief.    She has been experiencing constipation, which has been managed with stool softeners. She has had a bowel movement, which was soft with mild diarrhea.    SOCIAL HISTORY  She does not smoke.    Results  Labs   - White count: 5.29   - Hemoglobin: 12.6   - Platelets: 126 (mildly low)   - ANC: 4470   -  Sodium: 134 (low)   - Potassium: 3.4 (low)   - Calcium: 8.4 (low)   - Albumin: 2.9 (low)    Cancer Staging   No matching staging information was found for the patient.       Treatment intent: palliative    Oncology/Hematology History   Adenocarcinoma of lung   5/28/2025 Initial Diagnosis    Adenocarcinoma of lung     6/6/2025 -  Chemotherapy    OP LUNG Pembrolizumab 200 mg / Pemetrexed / Carboplatin AUC=5     6/18/2025 Biopsy    OP LUNG Pembrolizumab 200 mg  Plan Provider: Cristi Rios MD  Treatment goal: Control  Line of treatment: [No plan line of treatment]     6/18/2025 -  Chemotherapy    OP SUPPORTIVE HYDRATION + ANTIEMETICS         Review of Systems    Current Outpatient Medications on File Prior to Visit   Medication Sig Dispense Refill    albuterol sulfate  (90 Base) MCG/ACT inhaler Inhale 2 puffs Every 4 (Four) Hours As Needed for Wheezing. 18 g 0    amitriptyline (ELAVIL) 10 MG tablet Take 1 tablet by mouth Every Night. 90 tablet 1    atorvastatin (LIPITOR) 20 MG tablet Take 1 tablet by mouth Every Night. 90 tablet 1    benzonatate (Tessalon Perles) 100 MG capsule Take 1 capsule by mouth 3 (Three) Times a Day As Needed for Cough. 50 capsule 1    dexAMETHasone (DECADRON) 4 MG tablet Take 1 tablet by mouth Daily With Breakfast. 1 tablet twice daily for 3 days, day prior to chemo, day of chemo, day after chemo 24 tablet 0    Diclofenac Sodium (VOLTAREN) 1 % gel gel Apply  topically to the appropriate area as directed 4 (Four) Times a Day. 150 g 3    folic acid (FOLVITE) 1 MG tablet Take 1 tablet by mouth Daily. 90 tablet 1    hydroCHLOROthiazide 25 MG tablet Take 1 tablet by mouth Daily. 90 tablet 1    HYDROcodone-acetaminophen (NORCO) 5-325 MG per tablet Take 1 tablet by mouth Every 4 (Four) Hours As Needed for Moderate Pain or Severe Pain. 5 tablet 0    ipratropium-albuterol (DUO-NEB) 0.5-2.5 mg/3 ml nebulizer Take 3 mL by nebulization 4 (Four) Times a Day As Needed for Wheezing or Shortness of Air.  360 mL 1    levothyroxine (Synthroid) 50 MCG tablet Take 1 tablet by mouth Daily. 90 tablet 1    lidocaine-prilocaine (EMLA) 2.5-2.5 % cream Apply 1 Application topically to the appropriate area as directed Daily. Put on port site prior to chemotherapy appointments. 30 g 1    lisinopril (PRINIVIL,ZESTRIL) 10 MG tablet Take 1 tablet by mouth Daily. 90 tablet 1    loperamide (IMODIUM) 2 MG capsule Take 1 capsule by mouth 4 (Four) Times a Day As Needed for Diarrhea. 2 tablets at onset of diarrhea, one tablet after each loose BM up to 8 in 24 hours 30 capsule 0    mirtazapine (REMERON) 7.5 MG tablet Take 1 tablet by mouth Every Night. 30 tablet 0    OLANZapine (zyPREXA) 5 MG tablet Take 1 tablet by mouth Every Night. Take one tablet nightly for 4 nights starting on day of chemotherapy. 16 tablet 0    ondansetron (ZOFRAN) 8 MG tablet Take 1 tablet by mouth Every 8 (Eight) Hours As Needed for Nausea or Vomiting. 30 tablet 2    pantoprazole (Protonix) 40 MG EC tablet Take 1 tablet by mouth Daily. (Patient taking differently: Take 1 tablet by mouth Every Night.) 90 tablet 1    traZODone (DESYREL) 50 MG tablet Take 1 tablet by mouth Every Night. 90 tablet 1     No current facility-administered medications on file prior to visit.       Allergies   Allergen Reactions    Methotrexate Other (See Comments)     Pt caused hair loss      Past Medical History:   Diagnosis Date    Arthritis     C. difficile colitis     Depression     Endometriosis     GERD (gastroesophageal reflux disease) 02/12/2015    History of radiation therapy     completed on brain    Hyperlipidemia     Hypertension     controlled now    Hypothyroidism     Lung cancer     mets to brain, adrenal, rib    Osteoporosis     Vitamin D deficiency      Past Surgical History:   Procedure Laterality Date    BRONCHOSCOPY N/A 05/07/2025    Procedure: BRONCHOSCOPY WITH BRONCHOALVEOLAR LAVAGE, POSSIBLE BIOPSY, BRUSHING, WASHING, AIRWAY INSPECTION: insertion of lighted  "instrument to view inside the lung;  Surgeon: Raleigh Tompkins MD;  Location: [facility] MAIN OR;  Service: Pulmonary;  Laterality: N/A;    ROTATOR CUFF REPAIR Right     VENOUS ACCESS DEVICE (PORT) INSERTION Right 6/4/2025    Procedure: INSERTION VENOUS ACCESS DEVICE;  Surgeon: Manish Catalan MD;  Location: [facility] MAIN OR;  Service: General;  Laterality: Right;     Social History     Socioeconomic History    Marital status:    Tobacco Use    Smoking status: Never    Smokeless tobacco: Never   Vaping Use    Vaping status: Never Used   Substance and Sexual Activity    Alcohol use: Yes     Comment: rarely    Drug use: Never    Sexual activity: Defer     Family History   Problem Relation Age of Onset    Cancer Father     Asthma Father     Malig Hyperthermia Neg Hx      Immunization History   Administered Date(s) Administered    COVID-19 ([place]) 04/05/2021    Flu Vaccine Quad PF >36MO 02/09/2018    Fluzone High-Dose 65+YRS 01/02/2025    Fluzone High-Dose 65+yrs 10/26/2022, 10/17/2023    Influenza, Unspecified 10/21/2019    Pneumococcal Conjugate 20-Valent (PCV20) 10/17/2023    Pneumococcal Polysaccharide (PPSV23) 08/12/2014       Tobacco Use: Low Risk  (6/18/2025)    Patient History     Smoking Tobacco Use: Never     Smokeless Tobacco Use: Never     Passive Exposure: Not on file       Objective     Physical Exam    Vitals:    06/18/25 1342   BP: 104/80   Pulse: 95   Resp: 18   Temp: 97.9 °F (36.6 °C)   TempSrc: Temporal   SpO2: 98%   Weight: 57.5 kg (126 lb 12.2 oz)   Height: 156 cm (61.42\")   PainSc: 0-No pain       Wt Readings from Last 3 Encounters:   06/18/25 57.5 kg (126 lb 12.2 oz)   06/06/25 60.2 kg (132 lb 11.2 oz)   06/04/25 59.4 kg (130 lb 15.3 oz)        BMI is within normal parameters. No other follow-up for BMI required.       ECOG score: 2         ECOG: (2) Ambulatory & Capable of Self Care, Unable to Carry Out Work Activity, Up & About Greater Than 50% of Waking Hours  Fall Risk Assessment was " "completed, and patient is at moderate risk for falls.  PHQ-9 Total Score: 0       The patient is  experiencing fatigue. Fatigue score: 10    PT/OT Functional Screening: PT fx screen : Weakness  Speech Functional Screening: Speech fx screen : No needs identified  Rehab to be ordered: Rehab to be ordered : No needs identified        Result Review :  The following data was reviewed by: LARRY Sneed on 06/18/2025:  Lab Results   Component Value Date    HGB 12.6 06/18/2025    HCT 37.2 06/18/2025    MCV 88.8 06/18/2025     (L) 06/18/2025    WBC 5.29 06/18/2025    NEUTROABS 4.47 06/18/2025    LYMPHSABS 0.42 (L) 06/18/2025    MONOSABS 0.32 06/18/2025    EOSABS 0.04 06/18/2025    BASOSABS 0.01 06/18/2025     Lab Results   Component Value Date    GLUCOSE 95 06/18/2025    BUN 13.9 06/18/2025    CREATININE 0.73 06/18/2025     (L) 06/18/2025    K 3.4 (L) 06/18/2025    CL 98 06/18/2025    CO2 20.4 (L) 06/18/2025    CALCIUM 8.4 (L) 06/18/2025    PROTEINTOT 6.0 06/18/2025    ALBUMIN 2.9 (L) 06/18/2025    BILITOT 0.4 06/18/2025    ALKPHOS 69 06/18/2025    AST 20 06/18/2025    ALT 12 06/18/2025     Lab Results   Component Value Date    Ferritin 559.40 (H) 06/18/2025     Lab Results   Component Value Date    IRON 37 06/18/2025    LABIRON 18 (L) 06/18/2025    TRANSFERRIN 135 (L) 06/18/2025    TIBC 201 (L) 06/18/2025     Lab Results   Component Value Date    FERRITIN 559.40 (H) 06/18/2025     No results found for: \"PSA\", \"CEA\", \"AFP\", \"\", \"\"         Assessment and Plan:  Diagnoses and all orders for this visit:    1. Mass of upper outer quadrant of left breast (Primary)  -     Mammo Diagnostic Digital Tomosynthesis Left With CAD; Future    2. Adenocarcinoma of lung, unspecified laterality  -     Cancel: ONCBCN INFUSION APPOINTMENT REQUEST 01; Future  -     ONCBCN INFUSION APPOINTMENT REQUEST 01; Future    3. Hypokalemia  -     potassium chloride (MICRO-K) 10 MEQ CR capsule; Take 2 capsules by mouth " 2 (Two) Times a Day.  Dispense: 120 capsule; Refill: 0    Other orders  -     Cancel: sodium chloride 0.9 % bolus 1,000 mL  -     sodium chloride 0.9 % bolus 1,000 mL        Assessment & Plan  1. Lung cancer.  The patient completed her first cycle of chemotherapy on 06/06/2025. Current treatment includes carboplatin, pemetrexed, and pembrolizumab administered intravenously every 3 weeks. She reports feeling very weak and has a poor appetite. Lab work shows her white count is normal at 5.29, hemoglobin is 12.6, and platelets are mildly low at 126. The ANC is 4470. Chemistry results indicate low sodium at 134, potassium at 3.4, calcium at 8.4, and albumin at 2.9. She will receive intravenous fluids today. A prescription for potassium 40 mEq daily will be sent to her pharmacy due to her low potassium levels. The current plan is to continue with Keytruda (pembrolizumab) only, discontinuing the other chemotherapy agents. She will return next week for further treatment and follow up with Dr. Rios.     2. Dehydration: she will receive IVF's today with Normal saline.     3. Hypokalemia: K level of 3.4 today.  Micro-K 40 mEq sent to her pharmacy.     4. Rash: abdominal area, macular papular, pruitic. Using Benadryl / hydrocortisone cream PRN which seems to be helping. May be secondary to the immunotherapy. She is advised to monitor the rash and keep her nails clean to prevent infection. If the rash worsens, further evaluation will be needed.      5.  Medication management.  Mirtazapine and trazodone will be discontinued. Amitriptyline will be continued as prescribed. Olanzapine will be continued as a chemotherapy premedication to help with nausea and vomiting.    6.  Constipation.  Constipation has been managed with stool softeners.    7.  Health maintenance.  A mammogram will be scheduled earlier than planned due to previous abnormalities noted on a prior CT scan with the left outer breast area.         I spent 40 minutes  caring for Nancie on this date of service. This time includes time spent by me in the following activities:preparing for the visit, reviewing tests, obtaining and/or reviewing a separately obtained history, performing a medically appropriate examination and/or evaluation , counseling and educating the patient/family/caregiver, ordering medications, tests, or procedures, referring and communicating with other health care professionals , documenting information in the medical record, and independently interpreting results and communicating that information with the patient/family/caregiver    Patient Follow Up: 1 week with labs and treatment and follow up with Dr. Rios.     Patient was given instructions and counseling regarding her condition or for health maintenance advice. Please see specific information pulled into the AVS if appropriate.     LARRY Sneed    6/18/2025    .tob    Patient or patient representative verbalized consent for the use of Ambient Listening during the visit with  LARRY Sneed for chart documentation. 6/18/2025  15:18 EDT

## 2025-06-19 ENCOUNTER — PATIENT ROUNDING (BHMG ONLY) (OUTPATIENT)
Dept: RADIATION ONCOLOGY | Facility: HOSPITAL | Age: 80
End: 2025-06-19
Payer: MEDICARE

## 2025-06-19 NOTE — PROGRESS NOTES
"Patient Nancie Grissom completed radiation treatment to her Brain on 6/3/25. Following up with patient regarding any concerns the patient may have at this time and receiving feedback in regards to patient over all care while under treatment.     Patient asked about symptoms and side effects that continue to be bothersome. Spoke with patient's son, Ronen. Ronen informs that his mom has been very weak. He states that she received fluids today in Medical Oncology and that she is no longer doing chemo, but is continuing her Keytruda. She is do for labs, office visit and infusion on 6/27/25.  Ronen informs that Ms. Grissom is not wanting to eat/drink very much due to everything tasting too sweet. Education provided/discussed ways to alter taste of food to make less sweet. Ronen also reports that Ms. Grissom had an initial assessment today with HH.  She will be having a HH RN come on a weekly basis and is supposed to have PT/OT eval as well to work on increasing the strength in her legs.    Ronen did not report any pain for his mom.  No medication changes reported.    Patient was asked if there was anything that could've made their experience better at [facility] while they were under treatment. Patient states: \"no, everything was good.\"    Patient encouraged to call the office if any concerns arise. Patient reminded of follow up appointment on 7/10/25 with LARRY Gold at 130.   "

## 2025-06-24 NOTE — TELEPHONE ENCOUNTER
Caller: [facility]    Best call back number: [phone]    What orders are you requesting (i.e. lab or imaging): P/T FOR 2X THIS WEEK & 1X WEEKLY FOR 7 WEEKS    In what timeframe would the patient need to come in: ASAP    Where will you receive your lab/imaging services: HOME    Additional notes: NIKKO STATES HE CAN TAKE VERBAL ORDERS OVER THE PHONE.

## 2025-06-26 PROBLEM — Z79.899 ENCOUNTER FOR LONG-TERM (CURRENT) USE OF MEDICATIONS: Status: ACTIVE | Noted: 2025-06-26

## 2025-06-27 ENCOUNTER — TELEMEDICINE (OUTPATIENT)
Dept: ONCOLOGY | Facility: HOSPITAL | Age: 80
End: 2025-06-27
Payer: MEDICARE

## 2025-06-27 DIAGNOSIS — C79.31 METASTASIS TO BRAIN: ICD-10-CM

## 2025-06-27 DIAGNOSIS — C34.92 ADENOCARCINOMA OF LEFT LUNG: Primary | ICD-10-CM

## 2025-06-27 NOTE — PROGRESS NOTES
Chief Complaint/Care Team   Pt here to follow up regarding metastatic lung cancer    Cristi Rios MD Archemetre, Rose, MD    TELEHEALTH DISCLAIMER  You have chosen to receive care through a video telehealth visit. Do you consent to use a video telehealth visit for your medical care today?  YES  This visit has been scheduled or rescheduled as a video telehealth visit to comply with patient safety & health concerns in accordance with CDC recommendations.    Total time of discussion was 4 minutes.   I was located at [facility] Hem/onc clinic and pt was located at home.        History of Present Illness     Diagnosis: De Robbie Metastatic Adenocarcinoma, lung primary from Left lung pleural fluid on 5/7/2025  PDL % of 90%, negative for ALK, ROS1, EGFR per integrated oncology results, cT4,cN2,M1c    Current Treatment: Plan for Carboplatin, Pemetrexed, pembrolizumab IV Q3 weeks followed by pembrolizumab monotherapy    Previous Treatment: None    Hem/Onc History:  Nancie Grissom is a 79 y.o. female who presents to [facility] HEMATOLOGY [facility] ONCOLOGY for De Robbie Metastatic Adenocarcinoma, lung primary from Left lung pleural fluid on 5/7/2025    History of Present Illness  The patient is here to discuss treatment for lung cancer.    Her symptoms began with a persistent cough, initially diagnosed as pneumonia, which has been ongoing for approximately 2 months. She has never smoked in her life but has been exposed to secondhand smoke from her  and relatives. She reports a loss of appetite and has been experiencing difficulty walking due to a loss of sensation in her leg and right hand, which she describes as feeling foreign. She is able to feed herself but struggles with tasks such as holding a fork properly. She is able to use the restroom independently but requires assistance with diaper changes. She reports no numbness or tingling in her hands or feet.    She has no history of lupus but has a family history of  rheumatoid arthritis. She is currently on medication for thyroid disease. She has a history of shoulder issues, which were managed with a cast at the age of 10.    SOCIAL HISTORY  She has never smoked in her life.    FAMILY HISTORY  Her mother, father, and sister have a history of arthritis.       Interval history: Patient here to follow-up after receiving cycle 1 of chemoimmunotherapy and to discuss goals of care. Per report of patient she has become more weak and reliant on family to provide care for all ADLs/IADLs. Pt with decrease in po intake. Pt is interested in enrolling in hospice.     Review of Systems   Constitutional:  Positive for appetite change, fatigue and unexpected weight loss.        Oncology/Hematology History   Adenocarcinoma of lung   5/28/2025 Initial Diagnosis    Adenocarcinoma of lung     6/6/2025 -  Chemotherapy    OP LUNG Pembrolizumab 200 mg / Pemetrexed / Carboplatin AUC=5     6/18/2025 Biopsy    OP LUNG Pembrolizumab 200 mg  Plan Provider: Cristi Rios MD  Treatment goal: Control  Line of treatment: [No plan line of treatment]     6/18/2025 -  Chemotherapy    OP SUPPORTIVE HYDRATION + ANTIEMETICS         Objective     There were no vitals filed for this visit.    This was a telemedicine appointment thus no physical exam was performed.           Past Medical History     Past Medical History:   Diagnosis Date    Arthritis     C. difficile colitis     Depression     Endometriosis     GERD (gastroesophageal reflux disease) 02/12/2015    History of radiation therapy     completed on brain    Hyperlipidemia     Hypertension     controlled now    Hypothyroidism     Lung cancer     mets to brain, adrenal, rib    Osteoporosis     Vitamin D deficiency      Current Outpatient Medications on File Prior to Visit   Medication Sig Dispense Refill    albuterol sulfate  (90 Base) MCG/ACT inhaler Inhale 2 puffs Every 4 (Four) Hours As Needed for Wheezing. 18 g 0    amitriptyline (ELAVIL) 10 MG  tablet Take 1 tablet by mouth Every Night. 90 tablet 1    atorvastatin (LIPITOR) 20 MG tablet Take 1 tablet by mouth Every Night. 90 tablet 1    benzonatate (Tessalon Perles) 100 MG capsule Take 1 capsule by mouth 3 (Three) Times a Day As Needed for Cough. 50 capsule 1    dexAMETHasone (DECADRON) 4 MG tablet Take 1 tablet by mouth Daily With Breakfast. 1 tablet twice daily for 3 days, day prior to chemo, day of chemo, day after chemo 24 tablet 0    Diclofenac Sodium (VOLTAREN) 1 % gel gel Apply  topically to the appropriate area as directed 4 (Four) Times a Day. 150 g 3    folic acid (FOLVITE) 1 MG tablet Take 1 tablet by mouth Daily. 90 tablet 1    hydroCHLOROthiazide 25 MG tablet Take 1 tablet by mouth Daily. 90 tablet 1    HYDROcodone-acetaminophen (NORCO) 5-325 MG per tablet Take 1 tablet by mouth Every 4 (Four) Hours As Needed for Moderate Pain or Severe Pain. 5 tablet 0    ipratropium-albuterol (DUO-NEB) 0.5-2.5 mg/3 ml nebulizer Take 3 mL by nebulization 4 (Four) Times a Day As Needed for Wheezing or Shortness of Air. 360 mL 1    levothyroxine (Synthroid) 50 MCG tablet Take 1 tablet by mouth Daily. 90 tablet 1    lidocaine-prilocaine (EMLA) 2.5-2.5 % cream Apply 1 Application topically to the appropriate area as directed Daily. Put on port site prior to chemotherapy appointments. 30 g 1    lisinopril (PRINIVIL,ZESTRIL) 10 MG tablet Take 1 tablet by mouth Daily. 90 tablet 1    loperamide (IMODIUM) 2 MG capsule Take 1 capsule by mouth 4 (Four) Times a Day As Needed for Diarrhea. 2 tablets at onset of diarrhea, one tablet after each loose BM up to 8 in 24 hours 30 capsule 0    mirtazapine (REMERON) 7.5 MG tablet Take 1 tablet by mouth Every Night. 30 tablet 0    OLANZapine (zyPREXA) 5 MG tablet Take 1 tablet by mouth Every Night. Take one tablet nightly for 4 nights starting on day of chemotherapy. 16 tablet 0    ondansetron (ZOFRAN) 8 MG tablet Take 1 tablet by mouth Every 8 (Eight) Hours As Needed for Nausea  or Vomiting. 30 tablet 2    pantoprazole (Protonix) 40 MG EC tablet Take 1 tablet by mouth Daily. (Patient taking differently: Take 1 tablet by mouth Every Night.) 90 tablet 1    potassium chloride (MICRO-K) 10 MEQ CR capsule Take 2 capsules by mouth 2 (Two) Times a Day. 120 capsule 0    traZODone (DESYREL) 50 MG tablet Take 1 tablet by mouth Every Night. 90 tablet 1     No current facility-administered medications on file prior to visit.      Allergies   Allergen Reactions    Methotrexate Other (See Comments)     Pt caused hair loss      Past Surgical History:   Procedure Laterality Date    BRONCHOSCOPY N/A 05/07/2025    Procedure: BRONCHOSCOPY WITH BRONCHOALVEOLAR LAVAGE, POSSIBLE BIOPSY, BRUSHING, WASHING, AIRWAY INSPECTION: insertion of lighted instrument to view inside the lung;  Surgeon: Raleigh Tompkins MD;  Location: [facility] MAIN OR;  Service: Pulmonary;  Laterality: N/A;    ROTATOR CUFF REPAIR Right     VENOUS ACCESS DEVICE (PORT) INSERTION Right 6/4/2025    Procedure: INSERTION VENOUS ACCESS DEVICE;  Surgeon: Manish Catalan MD;  Location: [facility] MAIN OR;  Service: General;  Laterality: Right;     Social History     Socioeconomic History    Marital status:    Tobacco Use    Smoking status: Never    Smokeless tobacco: Never   Vaping Use    Vaping status: Never Used   Substance and Sexual Activity    Alcohol use: Yes     Comment: rarely    Drug use: Never    Sexual activity: Defer     Family History   Problem Relation Age of Onset    Cancer Father     Asthma Father     Malig Hyperthermia Neg Hx        Results     Result Review   The following data was reviewed by: Cristi Rios MD   Lab Results   Component Value Date    HGB 12.6 06/18/2025    HCT 37.2 06/18/2025    MCV 88.8 06/18/2025     (L) 06/18/2025    WBC 5.29 06/18/2025    NEUTROABS 4.47 06/18/2025    LYMPHSABS 0.42 (L) 06/18/2025    MONOSABS 0.32 06/18/2025    EOSABS 0.04 06/18/2025    BASOSABS 0.01 06/18/2025     Lab Results   Component  Value Date    GLUCOSE 95 06/18/2025    BUN 13.9 06/18/2025    CREATININE 0.73 06/18/2025     (L) 06/18/2025    K 3.4 (L) 06/18/2025    CL 98 06/18/2025    CO2 20.4 (L) 06/18/2025    CALCIUM 8.4 (L) 06/18/2025    PROTEINTOT 6.0 06/18/2025    ALBUMIN 2.9 (L) 06/18/2025    BILITOT 0.4 06/18/2025    ALKPHOS 69 06/18/2025    AST 20 06/18/2025    ALT 12 06/18/2025     Lab Results   Component Value Date    MG 1.9 06/18/2025    PHOS 2.6 06/18/2025    FREET4 1.24 06/06/2025    TSH 0.684 06/06/2025       No radiology results for the last day  XR Chest 1 View  Result Date: 6/4/2025  Impression: Impression: Placement of a right jugular port infusion catheter in good position without evidence of complication. Dense consolidation throughout the left lung likely representing pneumonia, mildly worse. At least a small left pleural effusion. Large hiatal hernia Electronically Signed: Abdoul Lindo MD  6/4/2025 5:04 PM EDT  Workstation ID: [id number]      Assessment & Plan     Diagnoses and all orders for this visit:    1. Adenocarcinoma of left lung (Primary)    2. Metastasis to brain           Nancie Grissom is a 79 y.o. female who presents to [facility] HEMATOLOGY & ONCOLOGY for De Robbie Metastatic Adenocarcinoma, lung primary from Left lung pleural fluid on 5/7/2025.    Assessment & Plan  1. Stage IV non-small cell lung cancer.  -The diagnosis is based on a biopsy of the left upper lobe mass showing adenocarcinoma, imaging revealing a large collection of fluid on the left side positive for adenocarcinoma, and a PET scan indicating widespread metastasis including the brain, fluid around the lung, adrenal gland, right fifth rib, and L4 vertebra. The patient has a high PD-L1 score (90%), making her a candidate for immunotherapy.   -Discussed treatment with chemotherapy with carboplatin and pemetrexed, along with Keytruda every 3 weeks for 4 cycles. Afterward, she will continue with Keytruda alone every 3 weeks. A  port will be placed for chemotherapy administration, and she will receive a prescription for a topical anesthetic cream to apply over the port site before each treatment. Labs including CBC, CMP, and thyroid function will be obtained today. An urgent referral to a surgeon for port placement has been made. She is advised to wear a mask, avoid large crowds, and follow COVID-19 precautions due to her immunocompromised status. If she develops a fever, she should seek immediate medical attention at the ER or contact us for further instructions. She will receive medications to manage potential side effects of chemotherapy, including nausea and vomiting. She will also receive an hour of chemo education.  -also ordered [employer] NGS testing in 5/2025 which showed MET mutation and high PDL1   -6/27/2025: Pt received one cycle of chemo/immunotherapy on 6/3/2025 and she is here for follow up and goals of care discussion. Per discussion with patient and family her performance status has declined to ECOG 4, shared my recommendation for enrollment in hospice per cancer guidelines given her decline in performance status, shared I did not think it would be safe to administer further therapy, shared further chemo/immunotherapy would cause more harm than good and shared I agreed with hospice enrollment. If pt's performance status improves, could consider resuming but at this time again agree with hospice enrollment, pt shared she has been in contact with hospice provider to enroll. As a result, we will cancel her clinic and treatment appointments with me.     Follow-up  The patient will follow up prn (and if dis-enrolls from hospice).    Please note that portions of this note were completed with a voice recognition program.    Electronically signed by Cristi Rios MD, 06/27/25, 10:02 AM EDT.        Follow Up     I spent 30 minutes caring for Nancie on this date of service. This time includes time spent by me in the following  activities:preparing for the visit, reviewing tests, obtaining and/or reviewing a separately obtained history, performing a medically appropriate examination and/or evaluation , counseling and educating the patient/family/caregiver, ordering medications, tests, or procedures, referring and communicating with other health care professionals , documenting information in the medical record, independently interpreting results and communicating that information with the patient/family/caregiver, and care coordination      The plan was discussed with the patient and/or family. The patient was given time to ask questions and these questions were answered. At the conclusion of their visit they had no additional questions or concerns and all questions were answered to their satisfaction.    Patient was given instructions and counseling regarding her condition or for health maintenance advice. Please see specific information pulled into the AVS if appropriate.       Patient or patient representative verbalized consent for the use of Ambient Listening during the visit with  Cristi Rois MD for chart documentation. 6/27/2025  17:03 EDT

## (undated) DEVICE — SYR LL TP 10ML STRL

## (undated) DEVICE — PENCL SMOKE/EVAC MEGADYNE TELESCP 10FT

## (undated) DEVICE — SLV SCD KN/LEN ADJ EXPRSS BLENDED MD 1P/U

## (undated) DEVICE — SOL IRR NACL 0.9PCT BO 500ML

## (undated) DEVICE — SOLIDIFIER LIQLOC PLS 1500CC BT

## (undated) DEVICE — SINGLE USE SUCTION VALVE MAJ-209: Brand: SINGLE USE SUCTION VALVE (STERILE)

## (undated) DEVICE — ADHS SKIN SURG TISS VISC PREMIERPRO EXOFIN HI/VISC 1ML

## (undated) DEVICE — ANTIBACTERIAL VIOLET BRAIDED (POLYGLACTIN 910), SYNTHETIC ABSORBABLE SUTURE: Brand: COATED VICRYL

## (undated) DEVICE — LINER SURG CANSTR SXN S/RIGD 1500CC

## (undated) DEVICE — CVR PROB ULTRASND CIVFLEX GEN/PURP TELESCP/FOLD 5.5X58IN LF

## (undated) DEVICE — NDL FLTR BLNT 18G 1 1/2IN

## (undated) DEVICE — ADAPT SWVL FIBROPTIC BRONCH

## (undated) DEVICE — BLCK/BITE BLOX WO/DENTL/RIM W/STRAP 54F

## (undated) DEVICE — CONTAINER,SPEC,PNEUM TUBE,3OZ,STRL PATH: Brand: MEDLINE

## (undated) DEVICE — SYR SLP TP 10ML DISP

## (undated) DEVICE — GLV SURG BIOGEL LTX PF 7 1/2

## (undated) DEVICE — INTENDED FOR TISSUE SEPARATION, AND OTHER PROCEDURES THAT REQUIRE A SHARP SURGICAL BLADE TO PUNCTURE OR CUT.: Brand: BARD-PARKER ® CARBON RIB-BACK BLADES

## (undated) DEVICE — SUT MNCRYL 4/0 PS2 18 IN

## (undated) DEVICE — KT COMPLIANCE ENDO PREV INFCT

## (undated) DEVICE — SINGLE USE BIOPSY VALVE MAJ-210: Brand: SINGLE USE BIOPSY VALVE (STERILE)

## (undated) DEVICE — HYPODERMIC SAFETY NEEDLE: Brand: MONOJECT

## (undated) DEVICE — THE STERILE LIGHT HANDLE COVER IS USED WITH STERIS SURGICAL LIGHTING AND VISUALIZATION SYSTEMS.

## (undated) DEVICE — VASCULAR ACCESS-LF: Brand: MEDLINE INDUSTRIES, INC.

## (undated) DEVICE — DEV ATOMIZATION MUCOSAL/NASALTRACH

## (undated) DEVICE — SYR LUER SLPTP 50ML

## (undated) DEVICE — TRAP,MUCUS SPECIMEN,40CC: Brand: MEDLINE